# Patient Record
Sex: FEMALE | Race: WHITE | NOT HISPANIC OR LATINO | Employment: OTHER | URBAN - METROPOLITAN AREA
[De-identification: names, ages, dates, MRNs, and addresses within clinical notes are randomized per-mention and may not be internally consistent; named-entity substitution may affect disease eponyms.]

---

## 2017-02-08 ENCOUNTER — OFFICE VISIT (OUTPATIENT)
Dept: UROLOGY | Facility: CLINIC | Age: 72
End: 2017-02-08
Payer: MEDICARE

## 2017-02-08 VITALS — HEART RATE: 65 BPM | SYSTOLIC BLOOD PRESSURE: 119 MMHG | HEIGHT: 62 IN | DIASTOLIC BLOOD PRESSURE: 81 MMHG

## 2017-02-08 DIAGNOSIS — C68.9 UROTHELIAL CANCER (H): Primary | ICD-10-CM

## 2017-02-08 PROCEDURE — 88305 TISSUE EXAM BY PATHOLOGIST: CPT | Mod: 26 | Performed by: UROLOGY

## 2017-02-08 PROCEDURE — 88305 TISSUE EXAM BY PATHOLOGIST: CPT | Performed by: UROLOGY

## 2017-02-08 PROCEDURE — 52204 CYSTOSCOPY W/BIOPSY(S): CPT | Performed by: UROLOGY

## 2017-02-08 NOTE — NURSING NOTE
"Initial /81 mmHg  Pulse 65  Ht 5' 2\" (1.575 m) Estimated body mass index is 30.72 kg/(m^2) as calculated from the following:    Height as of this encounter: 5' 2\" (1.575 m).    Weight as of 4/12/16: 168 lb (76.204 kg). .    "

## 2017-02-08 NOTE — Clinical Note
SURGERYPLANNING/SCHEDULING WORKSHEET                              [facility] [place]  [address] [address]  [phone]                          Allyson Samuel                :  1945  MRN:  [id number]  Phone: [phone] (home)    Same Day Surgery [phone]   Surgeon: Forest Jim MD  Diagnosis:  History og urothelial cancer  Allergies:  Latex; Bees; and Macrobid   A preoperative evaluation by the primary care provider has been requested to summarize and modify, where possible, medical risks to the proposed surgery.  Specific risks requiring evaluation include   Patient Active Problem List    Diagnosis     CKD (chronic kidney disease) stage 3, GFR 30-59 ml/min     History of kidney removal     History of MI (myocardial infarction)     Urothelial cancer (H)     HTN, goal below 140/90     Adrenal mass (H)     Pelvic mass     STEMI (ST elevation myocardial infarction) (H)     Macular degeneration, both eyes     CAD (coronary artery disease)     Hyperlipidemia LDL goal <100     HTN (hypertension)     Basal cell adenocarcinoma     Tobacco abuse       ====================================================  Surgical Procedure:  Cystoscopy, right retrograde pyelogram  Length of Procedure:  30 min  Type of anesthesia:  Local with MAC  The proposed surgical procedure is considered LOW risk.  Date of Procedure: Thursday May 4, 2017   Time: 2:45pm      Informed Consent Obtained and Signed:  NO  ====================================================  Instructions to Same Day Surgery Staff  Pneumoboots  Special Equipment: C-Arm  Preop Antibiotic:  Ancef 2 gm IV  pre-op within one hour prior to incision For > 80 kg  Preop Pain Meds:  None  PreOp Orders:  Routine Standing Orders.  ====================================================  Instructions to the patient:  Preop physical exam scheduled (within 30 days or 7 days prior)  with:   ____TBD________________  Clinic:  ____________________                                         Date______________Time_________________________  Come to the hospital at: YOU WILL RECEIVE A CALL WITH ARRIVAL TIME  HOME PREPARATION:   Shower with Hibiclens the night before and the morning of surgery, gently cleaning skin from neck to feet  Bathe and brush teeth the morning of surgery.  Take medications with a sip of water the morning of surgery:   Check with  if taking insulin.  May have  a light meal, toast and clear liquids, up to 6 hrs before surgery  May have clear liquids (liquids one can read through) up to 2 hrs before surgery  NOTHING after 2 hrs before surgery  Stop aspirin 7-10 days before surgery  Stop NSAIDS (Ibuproven, Naproxen, etc) 5 days before surgery  Stop Plavix 7-10 days before surgery    Arrange for transportation to and from [facility]       Forest Jim MD    2/8/2017

## 2017-02-09 NOTE — PROGRESS NOTES
REASON FOR VISIT TODAY:  Cystoscopy with fulguration of bladder lesion.      HISTORY:  Ms. 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  She underwent a robotic-assisted laparoscopic nephroureterectomy on 04/14/2015.  Final pathology demonstrated high-grade T1 disease with negative margins.  The patient had a bladder recurrence in 07/2015 showing an area of focal high-grade disease, the rest being low-grade.  She had another recurrence 04/2016 consistent with low-grade noninvasive urothelial carcinoma.  The patient underwent fulguration of 2 other small lesions in the bladder that were low-grade in appearance in 08/2016.  She was recently noted on a surveillance cystoscopy in 12/2016 to have a small lesion at the bladder neck.  The patient comes in today for biopsy and fulguration of this lesion.      After informed consent was obtained, the patient was prepped and draped in standard sterile fashion.  A flexible cystoscope was introduced into the patient's bladder without difficulty.  Inspection of the bladder revealed the right UO to be orthotopic.  The left UO was surgically absent.  Inspection of the bladder revealed again the small 3-4 mm papillary lesion noted at the bladder neck on the right side.  Previously, this has been referred to as being on the left, but in fact it is on the right side at the bladder neck.  There were no other mucosal lesions, stones or foreign objects noted in the bladder.  We used a biopsy forceps to biopsy this lesion and was sent for permanent pathologic analysis.  We then used the Bugbee cautery to fulgurate the base of this lesion.  The patient tolerated the procedure without difficulty.  There were no other complications noted.  I suggested to Ms. Samuel that she will give us a call back next week to go over the results of the biopsy.  Presuming it comes back as a low-grade lesion we will simply plan on seeing the patient back  in the operating room in the next 3 months or so for a cystoscopy and a right-sided retrograde pyelogram as part of her surveillance of her upper tracts for history of upper tract TCC.  Ms. Samuel is in agreement with the plan.         KATTY FERMIN MD             D: 2017 17:13   T: 2017 06:32   MT: [id number]      Name:     SELMA SAMUEL   MRN:      [id number]        Account:      [id number]   :      1945           Visit Date:   2017      Document: [id number]

## 2017-02-10 LAB — COPATH REPORT: NORMAL

## 2017-04-11 ENCOUNTER — OFFICE VISIT (OUTPATIENT)
Dept: FAMILY MEDICINE | Facility: CLINIC | Age: 72
End: 2017-04-11
Payer: MEDICARE

## 2017-04-11 VITALS
HEART RATE: 72 BPM | DIASTOLIC BLOOD PRESSURE: 75 MMHG | WEIGHT: 166 LBS | TEMPERATURE: 98.6 F | BODY MASS INDEX: 30.36 KG/M2 | SYSTOLIC BLOOD PRESSURE: 115 MMHG | OXYGEN SATURATION: 96 %

## 2017-04-11 DIAGNOSIS — I10 ESSENTIAL HYPERTENSION: ICD-10-CM

## 2017-04-11 DIAGNOSIS — Z01.818 PREOP GENERAL PHYSICAL EXAM: ICD-10-CM

## 2017-04-11 DIAGNOSIS — Z12.11 SPECIAL SCREENING FOR MALIGNANT NEOPLASMS, COLON: Primary | ICD-10-CM

## 2017-04-11 DIAGNOSIS — N18.30 CKD (CHRONIC KIDNEY DISEASE) STAGE 3, GFR 30-59 ML/MIN (H): ICD-10-CM

## 2017-04-11 DIAGNOSIS — I25.10 CORONARY ARTERY DISEASE INVOLVING NATIVE CORONARY ARTERY OF NATIVE HEART WITHOUT ANGINA PECTORIS: ICD-10-CM

## 2017-04-11 DIAGNOSIS — E78.5 HYPERLIPIDEMIA LDL GOAL <100: ICD-10-CM

## 2017-04-11 DIAGNOSIS — F17.200 TOBACCO USE DISORDER: ICD-10-CM

## 2017-04-11 LAB
ANION GAP SERPL CALCULATED.3IONS-SCNC: 10 MMOL/L (ref 3–14)
BUN SERPL-MCNC: 23 MG/DL (ref 7–30)
CALCIUM SERPL-MCNC: 9.1 MG/DL (ref 8.5–10.1)
CHLORIDE SERPL-SCNC: 106 MMOL/L (ref 94–109)
CO2 SERPL-SCNC: 23 MMOL/L (ref 20–32)
CREAT SERPL-MCNC: 1.07 MG/DL (ref 0.52–1.04)
ERYTHROCYTE [DISTWIDTH] IN BLOOD BY AUTOMATED COUNT: 14.6 % (ref 10–15)
GFR SERPL CREATININE-BSD FRML MDRD: 50 ML/MIN/1.7M2
GLUCOSE SERPL-MCNC: 82 MG/DL (ref 70–99)
HCT VFR BLD AUTO: 45 % (ref 35–47)
HGB BLD-MCNC: 15.4 G/DL (ref 11.7–15.7)
MCH RBC QN AUTO: 32.6 PG (ref 26.5–33)
MCHC RBC AUTO-ENTMCNC: 34.2 G/DL (ref 31.5–36.5)
MCV RBC AUTO: 95 FL (ref 78–100)
PLATELET # BLD AUTO: 176 10E9/L (ref 150–450)
POTASSIUM SERPL-SCNC: 4.4 MMOL/L (ref 3.4–5.3)
RBC # BLD AUTO: 4.72 10E12/L (ref 3.8–5.2)
SODIUM SERPL-SCNC: 139 MMOL/L (ref 133–144)
WBC # BLD AUTO: 8.7 10E9/L (ref 4–11)

## 2017-04-11 PROCEDURE — 93000 ELECTROCARDIOGRAM COMPLETE: CPT | Performed by: NURSE PRACTITIONER

## 2017-04-11 PROCEDURE — 36415 COLL VENOUS BLD VENIPUNCTURE: CPT | Performed by: NURSE PRACTITIONER

## 2017-04-11 PROCEDURE — 85027 COMPLETE CBC AUTOMATED: CPT | Performed by: NURSE PRACTITIONER

## 2017-04-11 PROCEDURE — 99215 OFFICE O/P EST HI 40 MIN: CPT | Performed by: NURSE PRACTITIONER

## 2017-04-11 PROCEDURE — 80048 BASIC METABOLIC PNL TOTAL CA: CPT | Performed by: NURSE PRACTITIONER

## 2017-04-11 NOTE — MR AVS SNAPSHOT
After Visit Summary   4/11/2017    Allyson Samuel    MRN: [id number]           Patient Information     Date Of Birth          1945        Visit Information        Provider Department      4/11/2017 10:00 AM Rita Massey APRN [facility]        Today's Diagnoses     Special screening for malignant neoplasms, colon    -  1    Preop general physical exam        Coronary artery disease involving native coronary artery of native heart without angina pectoris        Tobacco use disorder          Care Instructions      1. Stop taking Plavix and aspirin 1 week before surgery   2. Do not take Lisinopril the morning of surgery  3.  Labs and EKG today    Before Your Surgery      Call your surgeon if there is any change in your health. This includes signs of a cold or flu (such as a sore throat, runny nose, cough, rash or fever).    Do not smoke, drink alcohol or take over the counter medicine (unless your surgeon or primary care doctor tells you to) for the 24 hours before and after surgery.    If you take prescribed drugs: Follow your doctor s orders about which medicines to take and which to stop until after surgery.    Eating and drinking prior to surgery: follow the instructions from your surgeon    Take a shower or bath the night before surgery. Use the soap your surgeon gave you to gently clean your skin. If you do not have soap from your surgeon, use your regular soap. Do not shave or scrub the surgery site.  Wear clean pajamas and have clean sheets on your bed.         Follow-ups after your visit        Additional Services     CARDIOLOGY EVAL ADULT REFERRAL       Your provider has referred you to:  [facility]: [facility] [phone]   [email or web address]    Please be aware that coverage of these services is subject to the terms and limitations of your health insurance plan.  Call member services at your  health plan with any benefit or coverage questions.      Type of Referral:  Cardiology Follow Up- patient lost to follow up since 2014- needs to est. care    Timeframe requested:  Within 1 month    Please bring the following to your appointment:  >>   Any x-rays, CTs or MRIs which have been performed.  Contact the facility where they were done to arrange for  prior to your scheduled appointment.  Any new CT, MRI or other procedures ordered by your specialist must be performed at a [facility] or coordinated by your clinic's referral office.   >>   List of current medications  >>   This referral request   >>   Any documents/labs given to you for this referral                  Your next 10 appointments already scheduled     May 04, 2017   Procedure with Forest Jim MD   [facility] (--)    [address] [address] [phone]           The medical center is located at [address] (between [address] in [place], [place] miles north of [place]).              Future tests that were ordered for you today     Open Future Orders        Priority Expected Expires Ordered    Fecal colorectal cancer screen (FIT) Routine 5/2/2017 7/4/2017 4/11/2017            Who to contact     If you have questions or need follow up information about today's clinic visit or your schedule please contact [facility] directly at [phone].  Normal or non-critical lab and imaging results will be communicated to you by MyChart, letter or phone within 4 business days after the clinic has received the results. If you do not hear from us within 7 days, please contact the clinic through MyChart or phone. If you have a critical or abnormal lab result, we will notify you by phone as soon as possible.  Submit refill requests through [employer] or call your pharmacy and they will forward the refill request to us. Please allow 3 business days for your refill to  be completed.          Additional Information About Your Visit        [employer] Information     [employer] gives you secure access to your electronic health record. If you see a primary care provider, you can also send messages to your care team and make appointments. If you have questions, please call your primary care clinic.  If you do not have a primary care provider, please call [phone] and they will assist you.        Care EveryWhere ID     This is your Care EveryWhere ID. This could be used by other organizations to access your [place] medical records  [id number]        Your Vitals Were     Pulse Temperature Pulse Oximetry BMI (Body Mass Index)          72 98.6  F (37  C) (Tympanic) 96% 30.36 kg/m2         Blood Pressure from Last 3 Encounters:   04/11/17 115/75   02/08/17 119/81   12/22/16 145/89    Weight from Last 3 Encounters:   04/11/17 166 lb (75.3 kg)   04/12/16 168 lb (76.2 kg)   10/21/15 158 lb (71.7 kg)              We Performed the Following     Basic metabolic panel     CARDIOLOGY EVAL ADULT REFERRAL     CBC with platelets     EKG 12-lead complete w/read - Clinics        Primary Care Provider Office Phone # Fax #    Rita Felicianot, JULIETA [facility] [phone]       [facility] [address] [address]        Thank you!     Thank you for choosing [facility]  for your care. Our goal is always to provide you with excellent care. Hearing back from our patients is one way we can continue to improve our services. Please take a few minutes to complete the written survey that you may receive in the mail after your visit with us. Thank you!             Your Updated Medication List - Protect others around you: Learn how to safely use, store and throw away your medicines at www.disposemymeds.org.          This list is accurate as of: 4/11/17 10:32 AM.  Always use your most recent med list.                   Brand Name Dispense Instructions for use    acetaminophen  650 MG 8 hour tablet     100 tablet    Take 650 mg by mouth every 6 hours       ALPHAGAN P 0.1 % ophthalmic solution   Generic drug:  brimonidine      Place 1 drop Into the left eye 2 times daily       aspirin 81 MG tablet     30 tablet    Take 1 tablet (81 mg) by mouth daily       atorvastatin 80 MG tablet    LIPITOR    90 tablet    Take 1 tablet (80 mg) by mouth daily       clopidogrel 75 MG tablet    PLAVIX    90 tablet    Take 1 tablet (75 mg) by mouth daily       docusate sodium 100 MG tablet    COLACE    62 tablet    Take 100 mg by mouth 2 times daily as needed for constipation       HYDROcodone-acetaminophen 5-325 MG per tablet    NORCO    10 tablet    Take 1 tablet by mouth every 6 hours as needed for moderate to severe pain       lisinopril 20 MG tablet    PRINIVIL/ZESTRIL    90 tablet    Take 1 tablet (20 mg) by mouth daily       metoprolol 25 MG 24 hr tablet    TOPROL-XL    90 tablet    Take 1 tablet (25 mg) by mouth daily       nitroglycerin 0.4 MG sublingual tablet    NITROSTAT    25 tablet    Place 1 tablet (0.4 mg) under the tongue every 5 minutes as needed for chest pain       timolol maleate 0.5 % opthalmic solution    ISTALOL     Apply 1 drop to eye 2 times daily Left       travoprost Z (benzalkonium) 0.004 % ophthalmic solution    TRAVATAN Z     Place 1 drop into both eyes At Bedtime       ZYRTEC PO      Reported on 4/11/2017

## 2017-04-11 NOTE — NURSING NOTE
"Initial /75 (BP Location: Left arm, Patient Position: Chair, Cuff Size: Adult Large)  Pulse 72  Temp 98.6  F (37  C) (Tympanic)  Wt 166 lb (75.3 kg)  SpO2 96%  BMI 30.36 kg/m2 Estimated body mass index is 30.36 kg/(m^2) as calculated from the following:    Height as of 2/8/17: 5' 2\" (1.575 m).    Weight as of this encounter: 166 lb (75.3 kg). .    Shruthi Torres    "

## 2017-04-11 NOTE — LETTER
[facility]  [address] [address]  Phone: [phone]    April 12, 2017    Allyson Samuel  [address]  [place] [address]          Dear Ms. Samuel,    The results of your recent lab tests were within normal limits. Enclosed is a copy of these results.  If you have any further questions or problems, please contact our office.        Sincerely,      Rita Massey NP / TRAVIS

## 2017-04-11 NOTE — PATIENT INSTRUCTIONS
1. Stop taking Plavix and aspirin 1 week before surgery   2. Do not take Lisinopril the morning of surgery  3.  Labs and EKG today    Before Your Surgery      Call your surgeon if there is any change in your health. This includes signs of a cold or flu (such as a sore throat, runny nose, cough, rash or fever).    Do not smoke, drink alcohol or take over the counter medicine (unless your surgeon or primary care doctor tells you to) for the 24 hours before and after surgery.    If you take prescribed drugs: Follow your doctor s orders about which medicines to take and which to stop until after surgery.    Eating and drinking prior to surgery: follow the instructions from your surgeon    Take a shower or bath the night before surgery. Use the soap your surgeon gave you to gently clean your skin. If you do not have soap from your surgeon, use your regular soap. Do not shave or scrub the surgery site.  Wear clean pajamas and have clean sheets on your bed.

## 2017-04-11 NOTE — PROGRESS NOTES
[facility]  [address] [address]  [phone]  Dept: [phone]    PRE-OP EVALUATION:  Today's date: 2017    Allyson Samuel (: 1945) presents for pre-operative evaluation assessment as requested by Dr. Jim.  She requires evaluation and anesthesia risk assessment prior to undergoing surgery/procedure for treatment of Urothelial CA .  Proposed procedure: Combined Cystoscopy    Date of Surgery/ Procedure: 17  Time of Surgery/ Procedure: to be determined  Hospital/Surgical Facility: [facility]    Primary Physician: Rita Massey  Type of Anesthesia Anticipated: Local with MAC    Patient has a Health Care Directive or Living Will:  NO    1. YES - Do you have a history of heart attack, stroke, stent, bypass or surgery on an artery in the head, neck, heart or legs? Has a stent- MI with PCI in  and STEMI in   2. NO - Do you ever have any pain or discomfort in your chest?  3. YES - Do you have a history of  Heart Failure?  4. NO - Are you troubled by shortness of breath when: walking on the level, up a slight hill or at night?  5. NO - Do you currently have a cold, bronchitis or other respiratory infection?  6. NO - Do you have a cough, shortness of breath or wheezing?  7. NO - Do you sometimes get pains in the calves of your legs when you walk?  8. NO - Do you or anyone in your family have previous history of blood clots?  9. NO - Do you or does anyone in your family have a serious bleeding problem such as prolonged bleeding following surgeries or cuts?  10. NO - Have you ever had problems with anemia or been told to take iron pills?  11. NO - Have you had any abnormal blood loss such as black, tarry or bloody stools, or abnormal vaginal bleeding?  12. NO - Have you ever had a blood transfusion?  13. NO- Have you or any of your relatives ever had problems with anesthesia?  14. YES - Do you have sleep apnea, excessive snoring or daytime drowsiness?  Snores- never checked for ELVIRA  15. NO - Do you have any prosthetic heart valves?  16. NO - Do you have prosthetic joints?  17. NO - Is there any chance that you may be pregnant?      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 Specifically denies chest pain, palpitations, dyspnea, orthopnea, PND or peripheral edema. Blood pressure readings have been in normal range. Current medication regimen is as listed below. Patient denies any side effects of medication.      HYPERLIPIDEMIA - Patient has a long history of significant Hyperlipidemia requiring medication for treatment with recent good control. Patient reports no problems or side effects with the medication.      CAD - Patient has a longstanding history of mod-severe CAD. Patient denies recent chest pain or NTG use, denies exercise induced dyspnea or PND. Last Cardiac MRI on 6/2014 normal biventricular function.      SLEEP PROBLEM - Patient has a longstanding history of snoring and fatigue of moderate severity. Patient has tried OTC medications with limited success         MEDICAL HISTORY:                                                      Patient Active Problem List    Diagnosis Date Noted     CKD (chronic kidney disease) stage 3, GFR 30-59 ml/min 10/19/2015     Priority: Medium     History of MI (myocardial infarction) 04/08/2015     Priority: Medium     Urothelial cancer (H) 04/08/2015     Priority: Medium     HTN, goal below 140/90 01/26/2015     Priority: Medium     STEMI (ST elevation myocardial  infarction) (H) 06/30/2014     Priority: Medium     6/2014 - new stent placed       History of kidney removal 10/19/2015     Left kidney removed surgically due to adrenal mass         Adrenal mass (H) 01/24/2015     Pelvic mass 01/24/2015     Macular degeneration, both eyes 06/18/2012     CAD (coronary artery disease) 06/18/2012     MI/ stent in 2003       Hyperlipidemia LDL goal <100 06/18/2012     HTN (hypertension) 06/18/2012     Basal cell adenocarcinoma 06/18/2012     face       Tobacco abuse 06/18/2012      Past Medical History:   Diagnosis Date     CAD (coronary artery disease)     2003- MI with PCI to RCA, 6/2014 inferior STEMI- instent thrombosis RCA s/p PCI     Heart attack (H)      HTN (hypertension)      Hyperlipidemia LDL goal <70      Macular degeneration      PONV (postoperative nausea and vomiting)      Tobacco use      Past Surgical History:   Procedure Laterality Date     CARDIAC SURGERY       CYSTOSCOPY, BIOPSY BLADDER, COMBINED Right 4/14/2016    Procedure: COMBINED CYSTOSCOPY, BIOPSY BLADDER;  Surgeon: Forest Jim MD;  Location: [facility]     CYSTOSCOPY, REMOVE STENT(S), COMBINED Left 4/14/2015    Procedure: COMBINED CYSTOSCOPY, REMOVE STENT(S);  Surgeon: Forest Jim MD;  Location: [facility]     CYSTOSCOPY, RETROGRADES, COMBINED Left 10/21/2015    Procedure: COMBINED CYSTOSCOPY, RETROGRADES;  Surgeon: Forest Jim MD;  Location: [facility]     CYSTOSCOPY, RETROGRADES, INSERT STENT URETER(S), COMBINED Left 3/12/2015    Procedure: COMBINED CYSTOSCOPY, RETROGRADES, INSERT STENT URETER(S);  Surgeon: Forest Jim MD;  Location: [facility]     DAVINCI NEPHROURETERECTOMY Left 4/14/2015    Procedure: DAVINCI NEPHROURETERECTOMY;  Surgeon: Forest Jim MD;  Location: [facility] OR     GYN SURGERY       HYSTERECTOMY      age 40, fibroids. Still has right ovary     Current Outpatient Prescriptions   Medication Sig Dispense Refill     metoprolol (TOPROL-XL)  25 MG 24 hr tablet Take 1 tablet (25 mg) by mouth daily 90 tablet 3     lisinopril (PRINIVIL,ZESTRIL) 20 MG tablet Take 1 tablet (20 mg) by mouth daily 90 tablet 3     timolol maleate (ISTALOL) 0.5 % opthalmic solution Apply 1 drop to eye 2 times daily Left       clopidogrel (PLAVIX) 75 MG tablet Take 1 tablet (75 mg) by mouth daily 90 tablet 3     atorvastatin (LIPITOR) 80 MG tablet Take 1 tablet (80 mg) by mouth daily 90 tablet 3     brimonidine (ALPHAGAN P) 0.1 % ophthalmic solution Place 1 drop Into the left eye 2 times daily       travoprost Z, benzalkonium, (TRAVATAN Z) 0.004 % ophthalmic solution Place 1 drop into both eyes At Bedtime        aspirin 81 MG tablet Take 1 tablet (81 mg) by mouth daily 30 tablet      HYDROcodone-acetaminophen (NORCO) 5-325 MG per tablet Take 1 tablet by mouth every 6 hours as needed for moderate to severe pain (Patient not taking: Reported on 4/11/2017) 10 tablet 0     Cetirizine HCl (ZYRTEC PO) Reported on 4/11/2017       acetaminophen 650 MG TABS Take 650 mg by mouth every 6 hours (Patient not taking: Reported on 4/11/2017) 100 tablet 0     docusate sodium (COLACE) 100 MG tablet Take 100 mg by mouth 2 times daily as needed for constipation (Patient not taking: Reported on 4/11/2017) 62 tablet 1     nitroglycerin (NITROSTAT) 0.4 MG SL tablet Place 1 tablet (0.4 mg) under the tongue every 5 minutes as needed for chest pain (Patient not taking: Reported on 4/11/2017) 25 tablet 3     OTC products: None, except as noted above    Allergies   Allergen Reactions     Latex Rash     Bees      Macrobid [Nitrofurantoin] Nausea      Latex Allergy: NO    Social History   Substance Use Topics     Smoking status: Current Every Day Smoker     Packs/day: 0.10     Types: Cigarettes     Smokeless tobacco: Never Used      Comment: Trying hard to quit 9/24/15.  smokes about 2-3 cig per day     Alcohol use Yes      Comment: few drinks per month     History   Drug Use No       REVIEW OF SYSTEMS:                                                     C: NEGATIVE for fever, chills, change in weight  I: NEGATIVE for worrisome rashes, moles or lesions  E: NEGATIVE for vision changes or irritation  E/M: NEGATIVE for ear, mouth and throat problems  R: NEGATIVE for significant cough or SOB  B: NEGATIVE for masses, tenderness or discharge  CV: NEGATIVE for chest pain, palpitations or peripheral edema  GI: NEGATIVE for nausea, abdominal pain, heartburn, or change in bowel habits  : NEGATIVE for frequency, dysuria, or hematuria  M: NEGATIVE for significant arthralgias or myalgia  N: NEGATIVE for weakness, dizziness or paresthesias  E: NEGATIVE for temperature intolerance, skin/hair changes  H: NEGATIVE for bleeding problems  P: NEGATIVE for changes in mood or affect    EXAM:                                                    /75 (BP Location: Left arm, Patient Position: Chair, Cuff Size: Adult Large)  Pulse 72  Temp 98.6  F (37  C) (Tympanic)  Wt 166 lb (75.3 kg)  SpO2 96%  BMI 30.36 kg/m2    GENERAL APPEARANCE: healthy, alert and no distress     EYES: EOMI, PERRL     HENT: ear canals and TM's normal and nose and mouth without ulcers or lesions     NECK: no adenopathy, no asymmetry, masses, or scars and thyroid normal to palpation     RESP: lungs clear to auscultation - no rales, rhonchi or wheezes     CV: regular rates and rhythm, normal S1 S2, no S3 or S4 and no murmur, click or rub     ABDOMEN:  soft, nontender, no HSM or masses and bowel sounds normal     MS: extremities normal- no gross deformities noted, no evidence of inflammation in joints, FROM in all extremities.     SKIN: no suspicious lesions or rashes     NEURO: Normal strength and tone, sensory exam grossly normal, mentation intact and speech normal     PSYCH: mentation appears normal. and affect normal/bright     LYMPHATICS: No axillary, cervical, or supraclavicular nodes    DIAGNOSTICS:                                                      EKG:  Normal Sinus Rhythm, unchanged from previous tracings  Labs Resulted Today:   Results for orders placed or performed in visit on 04/11/17   CBC with platelets   Result Value Ref Range    WBC 8.7 4.0 - 11.0 10e9/L    RBC Count 4.72 3.8 - 5.2 10e12/L    Hemoglobin 15.4 11.7 - 15.7 g/dL    Hematocrit 45.0 35.0 - 47.0 %    MCV 95 78 - 100 fl    MCH 32.6 26.5 - 33.0 pg    MCHC 34.2 31.5 - 36.5 g/dL    RDW 14.6 10.0 - 15.0 %    Platelet Count 176 150 - 450 10e9/L   Basic metabolic panel   Result Value Ref Range    Sodium 139 133 - 144 mmol/L    Potassium 4.4 3.4 - 5.3 mmol/L    Chloride 106 94 - 109 mmol/L    Carbon Dioxide 23 20 - 32 mmol/L    Anion Gap 10 3 - 14 mmol/L    Glucose 82 70 - 99 mg/dL    Urea Nitrogen 23 7 - 30 mg/dL    Creatinine 1.07 (H) 0.52 - 1.04 mg/dL    GFR Estimate 50 (L) >60 mL/min/1.7m2    GFR Estimate If Black 61 >60 mL/min/1.7m2    Calcium 9.1 8.5 - 10.1 mg/dL       Recent Labs   Lab Test  04/12/16   0919  10/15/15   1014  10/13/15   1052   08/21/14   1039   06/30/14   0438   06/29/14   1932   HGB  15.0   --   14.0   < >   --    < >  14.4   < >  14.3   PLT  186   --   177   < >   --    < >  187   < >  190   INR   --    --    --    --    --    --    --    --   1.09   NA  137  138   --    < >   --    < >  138   < >  140   POTASSIUM  5.0  4.4   --    < >   --    < >  4.2   < >  5.1   CR  1.16*  1.17*   --    < >   --    < >  0.81   < >  0.93   A1C   --    --    --    --   6.2*   --   5.5   --    --     < > = values in this interval not displayed.        IMPRESSION:                                                    Reason for surgery/procedure: Cystoscopy   Diagnosis/reason for consult: preoperative exam    The proposed surgical procedure is considered INTERMEDIATE risk.    REVISED CARDIAC RISK INDEX  The patient has the following serious cardiovascular risks for perioperative complications such as (MI, PE, VFib and 3  AV Block):  Coronary Artery Disease (MI, positive stress test, angina, Qs on  EKG)  INTERPRETATION: 1 risks: Class II (low risk - 0.9% complication rate)    The patient has the following additional risks for perioperative complications:  No identified additional risks      ICD-10-CM    1. Special screening for malignant neoplasms, colon Z12.11 Fecal colorectal cancer screen (FIT)   2. Preop general physical exam Z01.818 CBC with platelets     Basic metabolic panel     EKG 12-lead complete w/read - Clinics   3. Coronary artery disease involving native coronary artery of native heart without angina pectoris I25.10 CARDIOLOGY EVAL ADULT REFERRAL   4. Tobacco use disorder F17.200    5. Hyperlipidemia LDL goal <100 E78.5    6. Essential hypertension I10    7. CKD (chronic kidney disease) stage 3, GFR 30-59 ml/min N18.3        RECOMMENDATIONS:                                                      --Consult hospital rounder / IM to assist post-op medical management    Cardiovascular Risk  Patient is already on a Beta Blocker. Continue Betablocker therapy after surgery, using Beta blocker order set as necessary for NPO status.  **Preop stress imaging recommended due to current serious symptoms/signs that would warrant stress testing regardless of preoperative status**      Pulmonary Risk  Incentive spirometry post op  Respiratory Therapy (Respiratory Care IP Consult)  post op  Advised smoking cessation.       Obstructive Sleep Apnea (or suspected sleep apnea)  Hospital staff are advised to monitor for sleep related oxygen desaturations due to suspicion of ELVIRA      --Patient is to take all scheduled medications on the day of surgery EXCEPT for modifications listed below.    Anticoagulant or Antiplatelet Medication Use  ASPIRIN: Discontinue ASA 7-10 days prior to procedure to reduce bleeding risk.  It should be resumed post-operatively.  PLAVIX: No contraindication to stopping plavix.  Stop 7-10 days prior to surgery        ACE Inhibitor or Angiotensin Receptor Blocker (ARB) Use  Ace inhibitor or  Angiotensin Receptor Blocker (ARB) and should HOLD this medication for the 24 hours prior to surgery.      APPROVAL GIVEN to proceed with proposed procedure, without further diagnostic evaluation       Signed Electronically by: JULIETA Molina CNP    Copy of this evaluation report is provided to requesting physician.    [place] Preop Guidelines

## 2017-04-15 DIAGNOSIS — Z12.11 SPECIAL SCREENING FOR MALIGNANT NEOPLASMS, COLON: ICD-10-CM

## 2017-04-15 PROCEDURE — 82274 ASSAY TEST FOR BLOOD FECAL: CPT | Performed by: NURSE PRACTITIONER

## 2017-04-17 LAB — HEMOCCULT STL QL IA: NEGATIVE

## 2017-04-21 ENCOUNTER — OFFICE VISIT (OUTPATIENT)
Dept: CARDIOLOGY | Facility: CLINIC | Age: 72
End: 2017-04-21
Attending: NURSE PRACTITIONER
Payer: MEDICARE

## 2017-04-21 VITALS
OXYGEN SATURATION: 97 % | HEART RATE: 64 BPM | BODY MASS INDEX: 30.36 KG/M2 | WEIGHT: 166 LBS | SYSTOLIC BLOOD PRESSURE: 156 MMHG | DIASTOLIC BLOOD PRESSURE: 83 MMHG

## 2017-04-21 DIAGNOSIS — I25.10 CORONARY ARTERY DISEASE INVOLVING NATIVE CORONARY ARTERY OF NATIVE HEART WITHOUT ANGINA PECTORIS: Primary | ICD-10-CM

## 2017-04-21 DIAGNOSIS — Z01.810 PRE-OPERATIVE CARDIOVASCULAR EXAMINATION: ICD-10-CM

## 2017-04-21 PROCEDURE — 99214 OFFICE O/P EST MOD 30 MIN: CPT | Performed by: INTERNAL MEDICINE

## 2017-04-21 NOTE — MR AVS SNAPSHOT
After Visit Summary   4/21/2017    Allyson Samuel    MRN: [id number]           Patient Information     Date Of Birth          1945        Visit Information        Provider Department      4/21/2017 10:30 AM Todd Ryder MD [facility] PHYSICIAN HEART AT [facility]        Today's Diagnoses     Coronary artery disease involving native coronary artery of native heart without angina pectoris    -  1    Pre-operative cardiovascular examination           Follow-ups after your visit        Your next 10 appointments already scheduled     May 04, 2017   Procedure with Forest Jim MD   [facility] PeriOP Services (--)    [address] [address] [phone]           The medical center is located at [address] (between [address] in [place], [place] north of [place]).              Who to contact     If you have questions or need follow up information about today's clinic visit or your schedule please contact [facility] PHYSICIAN HEART AT [facility] directly at [phone].  Normal or non-critical lab and imaging results will be communicated to you by mCASHhart, letter or phone within 4 business days after the clinic has received the results. If you do not hear from us within 7 days, please contact the clinic through [employer] or phone. If you have a critical or abnormal lab result, we will notify you by phone as soon as possible.  Submit refill requests through [employer] or call your pharmacy and they will forward the refill request to us. Please allow 3 business days for your refill to be completed.          Additional Information About Your Visit        mCASHhart Information     [employer] gives you secure access to your electronic health record. If you see a primary care provider, you can also send messages to your care team and make appointments. If you have questions, please call your primary care clinic.   If you do not have a primary care provider, please call [phone] and they will assist you.        Care EveryWhere ID     This is your Care EveryWhere ID. This could be used by other organizations to access your [employer] medical records  [id number]        Your Vitals Were     Pulse Pulse Oximetry BMI (Body Mass Index)             64 97% 30.36 kg/m2          Blood Pressure from Last 3 Encounters:   04/21/17 156/83   04/11/17 115/75   02/08/17 119/81    Weight from Last 3 Encounters:   04/21/17 75.3 kg (166 lb)   04/11/17 75.3 kg (166 lb)   04/12/16 76.2 kg (168 lb)              Today, you had the following     No orders found for display         Today's Medication Changes          These changes are accurate as of: 4/21/17 11:10 AM.  If you have any questions, ask your nurse or doctor.               Stop taking these medicines if you haven't already. Please contact your care team if you have questions.     clopidogrel 75 MG tablet   Commonly known as:  PLAVIX   Stopped by:  Todd Ryder MD           docusate sodium 100 MG tablet   Commonly known as:  COLACE   Stopped by:  Todd Ryder MD           HYDROcodone-acetaminophen 5-325 MG per tablet   Commonly known as:  NORCO   Stopped by:  Todd Ryder MD                    Primary Care Provider Office Phone # Fax #    JULIETA Molina [facility] [phone]       [place] [address]        Thank you!     Thank you for choosing [facility] PHYSICIAN HEART AT [facility]  for your care. Our goal is always to provide you with excellent care. Hearing back from our patients is one way we can continue to improve our services. Please take a few minutes to complete the written survey that you may receive in the mail after your visit with us. Thank you!             Your Updated Medication List - Protect others around you: Learn how to safely use, store and throw away  your medicines at www.disposemymeds.org.          This list is accurate as of: 4/21/17 11:10 AM.  Always use your most recent med list.                   Brand Name Dispense Instructions for use    acetaminophen 650 MG 8 hour tablet     100 tablet    Take 650 mg by mouth every 6 hours       ALPHAGAN P 0.1 % ophthalmic solution   Generic drug:  brimonidine      Place 1 drop Into the left eye 2 times daily       aspirin 81 MG tablet     30 tablet    Take 1 tablet (81 mg) by mouth daily       atorvastatin 80 MG tablet    LIPITOR    90 tablet    Take 1 tablet (80 mg) by mouth daily       lisinopril 20 MG tablet    PRINIVIL/ZESTRIL    90 tablet    Take 1 tablet (20 mg) by mouth daily       metoprolol 25 MG 24 hr tablet    TOPROL-XL    90 tablet    Take 1 tablet (25 mg) by mouth daily       nitroglycerin 0.4 MG sublingual tablet    NITROSTAT    25 tablet    Place 1 tablet (0.4 mg) under the tongue every 5 minutes as needed for chest pain       timolol maleate 0.5 % opthalmic solution    ISTALOL     Apply 1 drop to eye 2 times daily Left       travoprost Z (benzalkonium) 0.004 % ophthalmic solution    TRAVATAN Z     Place 1 drop into both eyes At Bedtime       ZYRTEC PO      Reported on 4/11/2017

## 2017-04-21 NOTE — LETTER
4/21/2017    Rita Massey, APRN [facility]  [address] [place] [address]    RE: Allyson Samuel       Dear Colleague,    I had the pleasure of seeing Allyson Samuel in the [facility].    CARDIOLOGY CONSULT    REASON FOR CONSULT: CAD, pre-op    PRIMARY CARE PHYSICIAN:  Rita Massey    HISTORY OF PRESENT ILLNESS:  72-year-old female with known coronary artery disease is seen for preop evaluation.  Medical history also notable for hypertension, dyslipidemia, history of tobacco use, and previous nephrectomy in 2015 for urothelial cancer.     She had inferior MI in 2003 with stent to the mid RCA.  In 2014 she presented again with STEMI from a stent thrombosis, angiogram then showed widely patent left main, LAD, and circumflex, she had successful thrombectomy and drug-eluting stent to the mid RCA.  Cardiac MRI June 2014 showed ejection fraction 57%, nontransmural infarct involving greater than 75% of wall thickness in the basal, mid, apical inferior segments, apical, and inferior segments, remainder of LV had good viability.     She's been feeling well recently.  She will do some walking in the stores and other light activity with no shortness of breath or chest pain.  Her weight has been stable with no lower extremity edema.    She has had serial cystoscopies since her nephrectomy in 2015.  She's not had any cardiac consultations during these.  She's been off her Plavix for 6 months.  Previously she would hold her aspirin for about 7 days before a cystoscopy.    PAST MEDICAL HISTORY:  Past Medical History:   Diagnosis Date     CAD (coronary artery disease)     2003- MI with PCI to RCA, 6/2014 inferior STEMI- instent thrombosis RCA s/p PCI     Heart attack (H)      HTN (hypertension)      Hyperlipidemia LDL goal <70      Macular degeneration      PONV (postoperative nausea and vomiting)      Tobacco use        MEDICATIONS:  Current Outpatient Prescriptions   Medication      metoprolol (TOPROL-XL) 25 MG 24 hr tablet     lisinopril (PRINIVIL,ZESTRIL) 20 MG tablet     Cetirizine HCl (ZYRTEC PO)     timolol maleate (ISTALOL) 0.5 % opthalmic solution     atorvastatin (LIPITOR) 80 MG tablet     brimonidine (ALPHAGAN P) 0.1 % ophthalmic solution     travoprost Z, benzalkonium, (TRAVATAN Z) 0.004 % ophthalmic solution     aspirin 81 MG tablet     nitroglycerin (NITROSTAT) 0.4 MG SL tablet     acetaminophen 650 MG TABS     No current facility-administered medications for this visit.        ALLERGIES:  Allergies   Allergen Reactions     Latex Rash     Bees      Macrobid [Nitrofurantoin] Nausea       SOCIAL HISTORY:  I have reviewed this patient's social history and updated it with pertinent information if needed. Allyson Samuel  reports that she has been smoking Cigarettes.  She has been smoking about 0.10 packs per day. She has never used smokeless tobacco. She reports that she drinks alcohol. She reports that she does not use illicit drugs.    FAMILY HISTORY:  I have reviewed this patient's family history and updated it with pertinent information if needed.   Family History   Problem Relation Age of Onset     Breast Cancer Mother      CEREBROVASCULAR DISEASE Father      HEART DISEASE Brother      pacemaker       REVIEW OF SYSTEMS:  Constitutional:  No weight loss, fever, chills, weakness or fatigue.  HEENT:  Eyes:  No visual loss, blurred vision, double vision or yellow sclerae. No hearing loss, sneezing, congestion, runny nose or sore throat.  Skin:  No rash or itching.  Cardiovascular: per HPI  Respiratory: per HPI  GI:  No anorexia, nausea, vomiting or diarrhea. No abdominal pain or blood.  :  No dysurea, hematuria  Neurologic:  No headache, dizziness, syncope, paralysis, ataxia, numbness or tingling in the extremities. No change in bowel or bladder control.  Musculoskeletal:  No muscle, back pain, joint pain or stiffness.  Hematologic:  No anemia, bleeding or bruising.  Lymphatics:  No  enlarged nodes. No history of splenectomy.  Psychiatric:  No history of depression or anxiety.  Endocrine:  No reports of sweating, cold or heat intolerance. No polyuria or polydipsia.  Allergies:  No history of asthma, hives, eczema or rhinitis.    PHYSICAL EXAM:      BP: 156/83 Pulse: 64     SpO2: 97 %      Vital Signs with Ranges  Pulse:  [64] 64  BP: (156)/(83) 156/83  SpO2:  [97 %] 97 %  166 lbs 0 oz    Constitutional: awake, alert, no distress  Eyes: PERRL, sclera nonicteric  ENT: trachea midline  Respiratory: clear lungs  Cardiovascular: Regular rate and rhythm, no murmurs  GI: nondistended, nontender, bowel sounds present  Lymph/Hematologic: no lymphadenopathy  Skin: dry, no rash  Musculoskeletal: good muscle tone, strength 5/5 in upper and lower extremities  Neurologic: no focal deficits  Neuropsychiatric: appropriate affact    DATA:  Labs:   April 2017: Potassium 4.4, creatinine 1.1     EKG April 11, 2017: Sinus rhythm, rate 64, inferior Q waves     ASSESSMENT:  72-year-old female with coronary artery disease seen for follow-up in preop evaluation.  She is doing well from a cardiac perspective and has no concerning symptoms.  She is over 2 years from her last stent, it is okay that she is off her clopidogrel.  She has no symptoms of angina, heart failure, arrhythmia, or severe valve disease.  Therefore she is low risk for any cardiac complication for her upcoming cystoscopy.    RECOMMENDATIONS:  1.  Coronary artery disease  - Continue current medications  - No need for cardiac testing at this point    2.  Preop evaluation  - Okay to hold aspirin before cystoscopy, restart as soon as possible afterward  - Low risk for this procedure, no further cardiac testing needed    Follow-up in 1 year.    Todd Ryder MD  Cardiology - [facility]  Pager:  [phone]  Text Page  April 21, 2017    Thank you for allowing me to participate in the care of your patient.    Sincerely,     Todd yRder MD      [facility]

## 2017-04-21 NOTE — PROGRESS NOTES
CARDIOLOGY CONSULT    REASON FOR CONSULT: CAD, pre-op    PRIMARY CARE PHYSICIAN:  Rita Massey    HISTORY OF PRESENT ILLNESS:  72-year-old female with known coronary artery disease is seen for preop evaluation.  Medical history also notable for hypertension, dyslipidemia, history of tobacco use, and previous nephrectomy in 2015 for urothelial cancer.     She had inferior MI in 2003 with stent to the mid RCA.  In 2014 she presented again with STEMI from a stent thrombosis, angiogram then showed widely patent left main, LAD, and circumflex, she had successful thrombectomy and drug-eluting stent to the mid RCA.  Cardiac MRI June 2014 showed ejection fraction 57%, nontransmural infarct involving greater than 75% of wall thickness in the basal, mid, apical inferior segments, apical, and inferior segments, remainder of LV had good viability.     She's been feeling well recently.  She will do some walking in the stores and other light activity with no shortness of breath or chest pain.  Her weight has been stable with no lower extremity edema.    She has had serial cystoscopies since her nephrectomy in 2015.  She's not had any cardiac consultations during these.  She's been off her Plavix for 6 months.  Previously she would hold her aspirin for about 7 days before a cystoscopy.    PAST MEDICAL HISTORY:  Past Medical History:   Diagnosis Date     CAD (coronary artery disease)     2003- MI with PCI to RCA, 6/2014 inferior STEMI- instent thrombosis RCA s/p PCI     Heart attack (H)      HTN (hypertension)      Hyperlipidemia LDL goal <70      Macular degeneration      PONV (postoperative nausea and vomiting)      Tobacco use        MEDICATIONS:  Current Outpatient Prescriptions   Medication     metoprolol (TOPROL-XL) 25 MG 24 hr tablet     lisinopril (PRINIVIL,ZESTRIL) 20 MG tablet     Cetirizine HCl (ZYRTEC PO)     timolol maleate (ISTALOL) 0.5 % opthalmic solution     atorvastatin (LIPITOR) 80 MG tablet     brimonidine  (ALPHAGAN P) 0.1 % ophthalmic solution     travoprost Z, benzalkonium, (TRAVATAN Z) 0.004 % ophthalmic solution     aspirin 81 MG tablet     nitroglycerin (NITROSTAT) 0.4 MG SL tablet     acetaminophen 650 MG TABS     No current facility-administered medications for this visit.        ALLERGIES:  Allergies   Allergen Reactions     Latex Rash     Bees      Macrobid [Nitrofurantoin] Nausea       SOCIAL HISTORY:  I have reviewed this patient's social history and updated it with pertinent information if needed. Allyson Samuel  reports that she has been smoking Cigarettes.  She has been smoking about 0.10 packs per day. She has never used smokeless tobacco. She reports that she drinks alcohol. She reports that she does not use illicit drugs.    FAMILY HISTORY:  I have reviewed this patient's family history and updated it with pertinent information if needed.   Family History   Problem Relation Age of Onset     Breast Cancer Mother      CEREBROVASCULAR DISEASE Father      HEART DISEASE Brother      pacemaker       REVIEW OF SYSTEMS:  Constitutional:  No weight loss, fever, chills, weakness or fatigue.  HEENT:  Eyes:  No visual loss, blurred vision, double vision or yellow sclerae. No hearing loss, sneezing, congestion, runny nose or sore throat.  Skin:  No rash or itching.  Cardiovascular: per HPI  Respiratory: per HPI  GI:  No anorexia, nausea, vomiting or diarrhea. No abdominal pain or blood.  :  No dysurea, hematuria  Neurologic:  No headache, dizziness, syncope, paralysis, ataxia, numbness or tingling in the extremities. No change in bowel or bladder control.  Musculoskeletal:  No muscle, back pain, joint pain or stiffness.  Hematologic:  No anemia, bleeding or bruising.  Lymphatics:  No enlarged nodes. No history of splenectomy.  Psychiatric:  No history of depression or anxiety.  Endocrine:  No reports of sweating, cold or heat intolerance. No polyuria or polydipsia.  Allergies:  No history of asthma, hives, eczema  or rhinitis.    PHYSICAL EXAM:      BP: 156/83 Pulse: 64     SpO2: 97 %      Vital Signs with Ranges  Pulse:  [64] 64  BP: (156)/(83) 156/83  SpO2:  [97 %] 97 %  166 lbs 0 oz    Constitutional: awake, alert, no distress  Eyes: PERRL, sclera nonicteric  ENT: trachea midline  Respiratory: clear lungs  Cardiovascular: Regular rate and rhythm, no murmurs  GI: nondistended, nontender, bowel sounds present  Lymph/Hematologic: no lymphadenopathy  Skin: dry, no rash  Musculoskeletal: good muscle tone, strength 5/5 in upper and lower extremities  Neurologic: no focal deficits  Neuropsychiatric: appropriate affact    DATA:  Labs:   April 2017: Potassium 4.4, creatinine 1.1     EKG April 11, 2017: Sinus rhythm, rate 64, inferior Q waves     ASSESSMENT:  72-year-old female with coronary artery disease seen for follow-up in preop evaluation.  She is doing well from a cardiac perspective and has no concerning symptoms.  She is over 2 years from her last stent, it is okay that she is off her clopidogrel.  She has no symptoms of angina, heart failure, arrhythmia, or severe valve disease.  Therefore she is low risk for any cardiac complication for her upcoming cystoscopy.    RECOMMENDATIONS:  1.  Coronary artery disease  - Continue current medications  - No need for cardiac testing at this point    2.  Preop evaluation  - Okay to hold aspirin before cystoscopy, restart as soon as possible afterward  - Low risk for this procedure, no further cardiac testing needed    Follow-up in 1 year.    Todd Ryder MD  Cardiology - [facility]  Pager:  [phone]  Text Page  April 21, 2017

## 2017-04-26 ENCOUNTER — OFFICE VISIT (OUTPATIENT)
Dept: FAMILY MEDICINE | Facility: CLINIC | Age: 72
End: 2017-04-26
Payer: MEDICARE

## 2017-04-26 VITALS
WEIGHT: 172 LBS | RESPIRATION RATE: 16 BRPM | TEMPERATURE: 98.8 F | HEIGHT: 62 IN | DIASTOLIC BLOOD PRESSURE: 69 MMHG | HEART RATE: 68 BPM | SYSTOLIC BLOOD PRESSURE: 136 MMHG | BODY MASS INDEX: 31.65 KG/M2 | OXYGEN SATURATION: 97 %
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
## 2017-04-26 PROCEDURE — 99212 OFFICE O/P EST SF 10 MIN: CPT | Performed by: FAMILY MEDICINE

## 2017-04-26 NOTE — PROGRESS NOTES
"  SUBJECTIVE:                                                    Allyson Samuel is a 72 year old female who presents to clinic today for the following health issues:      Insect Bite      Duration: 1 week ago was bit by a tick - patient is almost positive it is a wood tick.    Description (location/character/radiation): tick found attached to skin on second interdigital web on left hand when patient woke up 1 week ago. Patient states she is unsure how long the tick is attached.  Area turned \"a little red\" and \"itchy\".    Red, swollen and itchy. She pulled it off and is unsure if she was able to get it all out.     Intensity:  mild    Accompanying signs and symptoms: None    History (similar episodes/previous evaluation): None    Precipitating or alleviating factors: None    Therapies tried and outcome: Cortisone Cream, relieved the itching    Verified above history with patient.       Problem list and histories reviewed & adjusted, as indicated.  Additional history: as documented    Patient Active Problem List   Diagnosis     Macular degeneration, both eyes     CAD (coronary artery disease)     Hyperlipidemia LDL goal <100     HTN (hypertension)     Basal cell adenocarcinoma     Tobacco abuse     STEMI (ST elevation myocardial infarction) (H)     Adrenal mass (H)     Pelvic mass     HTN, goal below 140/90     History of MI (myocardial infarction)     Urothelial cancer (H)     CKD (chronic kidney disease) stage 3, GFR 30-59 ml/min     History of kidney removal     Past Surgical History:   Procedure Laterality Date     CARDIAC SURGERY       CYSTOSCOPY, BIOPSY BLADDER, COMBINED Right 4/14/2016    Procedure: COMBINED CYSTOSCOPY, BIOPSY BLADDER;  Surgeon: Forest Jim MD;  Location: [facility]     CYSTOSCOPY, REMOVE STENT(S), COMBINED Left 4/14/2015    Procedure: COMBINED CYSTOSCOPY, REMOVE STENT(S);  Surgeon: Forest Jim MD;  Location: [facility]     CYSTOSCOPY, RETROGRADES, COMBINED Left 10/21/2015    " Procedure: COMBINED CYSTOSCOPY, RETROGRADES;  Surgeon: Forest Jim MD;  Location: [facility]     CYSTOSCOPY, RETROGRADES, INSERT STENT URETER(S), COMBINED Left 3/12/2015    Procedure: COMBINED CYSTOSCOPY, RETROGRADES, INSERT STENT URETER(S);  Surgeon: Forest Jim MD;  Location: [facility]     DAVINCI NEPHROURETERECTOMY Left 4/14/2015    Procedure: DAVINCI NEPHROURETERECTOMY;  Surgeon: Forest Jim MD;  Location: [facility] OR     GYN SURGERY       HYSTERECTOMY      age 40, fibroids. Still has right ovary       Social History   Substance Use Topics     Smoking status: Current Every Day Smoker     Packs/day: 0.10     Types: Cigarettes     Smokeless tobacco: Never Used      Comment: Trying hard to quit 9/24/15.  smokes about 2-3 cig per day     Alcohol use Yes      Comment: few drinks per month     Family History   Problem Relation Age of Onset     Breast Cancer Mother      CEREBROVASCULAR DISEASE Father      HEART DISEASE Brother      pacemaker         Current Outpatient Prescriptions   Medication Sig Dispense Refill     metoprolol (TOPROL-XL) 25 MG 24 hr tablet Take 1 tablet (25 mg) by mouth daily 90 tablet 3     lisinopril (PRINIVIL,ZESTRIL) 20 MG tablet Take 1 tablet (20 mg) by mouth daily 90 tablet 3     Cetirizine HCl (ZYRTEC PO) Reported on 4/11/2017       timolol maleate (ISTALOL) 0.5 % opthalmic solution Apply 1 drop to eye 2 times daily Left       atorvastatin (LIPITOR) 80 MG tablet Take 1 tablet (80 mg) by mouth daily 90 tablet 3     acetaminophen 650 MG TABS Take 650 mg by mouth every 6 hours 100 tablet 0     travoprost Z, benzalkonium, (TRAVATAN Z) 0.004 % ophthalmic solution Place 1 drop into both eyes At Bedtime        aspirin 81 MG tablet Take 1 tablet (81 mg) by mouth daily 30 tablet      nitroglycerin (NITROSTAT) 0.4 MG SL tablet Place 1 tablet (0.4 mg) under the tongue every 5 minutes as needed for chest pain 25 tablet 3     Allergies   Allergen Reactions     Latex Rash  "    Bees      Macrobid [Nitrofurantoin] Nausea       Reviewed and updated as needed this visit by clinical staff  Tobacco  Allergies  Soc Hx      Reviewed and updated as needed this visit by Provider         ROS:  C: NEGATIVE for fever, chills, change in weight  INTEGUMENTARY/SKIN: see above  MUSCULOSKELETAL: see above  H: NEGATIVE for bleeding problems    OBJECTIVE:                                                    /69 (BP Location: Right arm, Patient Position: Chair, Cuff Size: Adult Regular)  Pulse 68  Temp 98.8  F (37.1  C) (Tympanic)  Resp 16  Ht 5' 2\" (1.575 m)  Wt 172 lb (78 kg)  SpO2 97%  BMI 31.46 kg/m2  Body mass index is 31.46 kg/(m^2).  GEN: alert, oriented x 3, NAD  SKIN: good turgor; on left 2nd interdigital web, there is a 2 mm scaly papule with surrounding mild diffuse erythema but no induration or tenderness, no fluctuance; no erythema migrans  LEFT HAND: no swelling, full range of motion of all joints  Diagnostic test results:  Diagnostic Test Results:  none      ASSESSMENT/PLAN:                                                        ICD-10-CM    1. Tick bite, initial encounter W57.XXXA    No erythema migrans.  Discussed with patient expected possible reactions to tick bites.  No sign suspicious for retained tick part.  Bite site care discussed with patient.  .  Tick bite precautions discussed; printed in AVS.    Follow up with Provider - prn   Patient Instructions     Tick Bite (No Abx)    You have been bitten by a tick. Ticks are small insects that feed on the blood of rodents, rabbits, birds, deer, dogs, and humans. The bite may cause a small local reaction like that of a spider, with a small amount of local redness, itching and slight swelling. Sometimes there is no local reaction.  Most tick bites are harmless, but some ticks carry diseases, such as Lyme disease or [place] spotted fever, that can be passed to people at the time of the bite. Lyme disease is of greatest " concern. Right now you have no symptoms of Lyme disease or other serious reaction to the bite. It is important to watch for the warning signs, which could appear weeks to months after the tick bite.  Home care  The following will help you care for your bite at home:  1. If itching is a problem, avoid tight clothing and anything that heats up your skin (hot showers/baths, direct sunlight). This will tend to make the itching worse. Use ice packs to reduce redness as well as itching.  2. An ice pack (ice cubes in a plastic bag, wrapped in a towel) will reduce local areas of redness and itching. Creams containing benzocaine (available over-the-counter) will reduce itching.  3. If large areas of the skin are involved, and if no other antihistamine was given, you can use an antihistamine with diphenhydramine, which is available at drug stores or groceries. It may be used to reduce itching if large areas of the skin are involved. If symptoms continue, seek the advice of your doctor or pharmacist about over-the-counter medications.  Follow-up care  Follow up with your doctor or as advised.  When to seek medical care  Get prompt medical attention if any of the following occur:  Signs of local infection (during next few days)    Increasing redness around the bite site    Increased pain or swelling    Fever over 100.4 F (38.0 C)    Fluid draining from the bite area  Signs of tick-related disease (during next few weeks to months)    Circular red ring-like rash appears at the bite area within 1 to 3 weeks    Tiredness, fever or chills, nausea or vomiting    Neck pain or stiffness, headache, confusion    Muscle, bone aching    Irregular or rapid heart beat    Joint pain or swelling (especially the knee joint)    Numbness or tingling or weakness in the arms or legs    Weakness on one side of the face    [id number] [employer]. [address] [place] [address], [place] [address]. All rights reserved. This information is not  intended as a substitute for professional medical care. Always follow your healthcare professional's instructions.            Zoran Arvizu MD  [facility]

## 2017-04-26 NOTE — MR AVS SNAPSHOT
After Visit Summary   4/26/2017    Allyson Samuel    MRN: [id number]           Patient Information     Date Of Birth          1945        Visit Information        Provider Department      4/26/2017 2:40 PM Zoran Arvizu MD [facility]        Today's Diagnoses     Tick bite, initial encounter    -  1      Care Instructions      Tick Bite (No Abx)    You have been bitten by a tick. Ticks are small insects that feed on the blood of rodents, rabbits, birds, deer, dogs, and humans. The bite may cause a small local reaction like that of a spider, with a small amount of local redness, itching and slight swelling. Sometimes there is no local reaction.  Most tick bites are harmless, but some ticks carry diseases, such as Lyme disease or [place] spotted fever, that can be passed to people at the time of the bite. Lyme disease is of greatest concern. Right now you have no symptoms of Lyme disease or other serious reaction to the bite. It is important to watch for the warning signs, which could appear weeks to months after the tick bite.  Home care  The following will help you care for your bite at home:  1. If itching is a problem, avoid tight clothing and anything that heats up your skin (hot showers/baths, direct sunlight). This will tend to make the itching worse. Use ice packs to reduce redness as well as itching.  2. An ice pack (ice cubes in a plastic bag, wrapped in a towel) will reduce local areas of redness and itching. Creams containing benzocaine (available over-the-counter) will reduce itching.  3. If large areas of the skin are involved, and if no other antihistamine was given, you can use an antihistamine with diphenhydramine, which is available at drug stores or groceries. It may be used to reduce itching if large areas of the skin are involved. If symptoms continue, seek the advice of your doctor or pharmacist about over-the-counter medications.  Follow-up  care  Follow up with your doctor or as advised.  When to seek medical care  Get prompt medical attention if any of the following occur:  Signs of local infection (during next few days)    Increasing redness around the bite site    Increased pain or swelling    Fever over 100.4 F (38.0 C)    Fluid draining from the bite area  Signs of tick-related disease (during next few weeks to months)    Circular red ring-like rash appears at the bite area within 1 to 3 weeks    Tiredness, fever or chills, nausea or vomiting    Neck pain or stiffness, headache, confusion    Muscle, bone aching    Irregular or rapid heart beat    Joint pain or swelling (especially the knee joint)    Numbness or tingling or weakness in the arms or legs    Weakness on one side of the face    0936-4887 [employer]. [address] [place] [address]. All rights reserved. This information is not intended as a substitute for professional medical care. Always follow your healthcare professional's instructions.              Follow-ups after your visit        Your next 10 appointments already scheduled     May 04, 2017   Procedure with Forest Jim MD   [facility] (--)    [address] [place] [id number] [phone]           The medical center is located at [address] [facility]. (between [address] and [address] in [place], [place] miles north of [place]).              Who to contact     If you have questions or need follow up information about today's clinic visit or your schedule please contact [facility] directly at [phone].  Normal or non-critical lab and imaging results will be communicated to you by MyChart, letter or phone within 4 business days after the clinic has received the results. If you do not hear from us within 7 days, please contact the clinic through MyChart or phone. If you have a critical or abnormal lab result, we will notify you by phone as soon as  "possible.  Submit refill requests through [employer] or call your pharmacy and they will forward the refill request to us. Please allow 3 business days for your refill to be completed.          Additional Information About Your Visit        [employer] Information     [employer] gives you secure access to your electronic health record. If you see a primary care provider, you can also send messages to your care team and make appointments. If you have questions, please call your primary care clinic.  If you do not have a primary care provider, please call [phone] and they will assist you.        Care EveryWhere ID     This is your Care EveryWhere ID. This could be used by other organizations to access your [place] medical records  [id number]        Your Vitals Were     Pulse Temperature Respirations Height Pulse Oximetry BMI (Body Mass Index)    68 98.8  F (37.1  C) (Tympanic) 16 5' 2\" (1.575 m) 97% 31.46 kg/m2       Blood Pressure from Last 3 Encounters:   04/26/17 136/69   04/21/17 156/83   04/11/17 115/75    Weight from Last 3 Encounters:   04/26/17 172 lb (78 kg)   04/21/17 166 lb (75.3 kg)   04/11/17 166 lb (75.3 kg)              Today, you had the following     No orders found for display       Primary Care Provider Office Phone # Fax #    JULIETA Molina [facility] [phone]       [facility] [address] [place] [address]        Thank you!     Thank you for choosing [facility]  for your care. Our goal is always to provide you with excellent care. Hearing back from our patients is one way we can continue to improve our services. Please take a few minutes to complete the written survey that you may receive in the mail after your visit with us. Thank you!             Your Updated Medication List - Protect others around you: Learn how to safely use, store and throw away your medicines at www.disposemymeds.org.          This list is accurate as of: 4/26/17  3:11 PM.  Always " use your most recent med list.                   Brand Name Dispense Instructions for use    acetaminophen 650 MG 8 hour tablet     100 tablet    Take 650 mg by mouth every 6 hours       aspirin 81 MG tablet     30 tablet    Take 1 tablet (81 mg) by mouth daily       atorvastatin 80 MG tablet    LIPITOR    90 tablet    Take 1 tablet (80 mg) by mouth daily       lisinopril 20 MG tablet    PRINIVIL/ZESTRIL    90 tablet    Take 1 tablet (20 mg) by mouth daily       metoprolol 25 MG 24 hr tablet    TOPROL-XL    90 tablet    Take 1 tablet (25 mg) by mouth daily       nitroglycerin 0.4 MG sublingual tablet    NITROSTAT    25 tablet    Place 1 tablet (0.4 mg) under the tongue every 5 minutes as needed for chest pain       timolol maleate 0.5 % opthalmic solution    ISTALOL     Apply 1 drop to eye 2 times daily Left       travoprost Z (benzalkonium) 0.004 % ophthalmic solution    TRAVATAN Z     Place 1 drop into both eyes At Bedtime       ZYRTEC PO      Reported on 4/11/2017

## 2017-04-26 NOTE — NURSING NOTE
"No chief complaint on file.      Initial LMP 02/24/2017 (Exact Date) Estimated body mass index is 23.49 kg/(m^2) as calculated from the following:    Height as of 4/4/17: 5' 7\" (1.702 m).    Weight as of 4/4/17: 150 lb (68 kg).  Medication Reconciliation: complete     Kerry Castillo CMA (AAMA)  "

## 2017-04-26 NOTE — PATIENT INSTRUCTIONS
Tick Bite (No Abx)    You have been bitten by a tick. Ticks are small insects that feed on the blood of rodents, rabbits, birds, deer, dogs, and humans. The bite may cause a small local reaction like that of a spider, with a small amount of local redness, itching and slight swelling. Sometimes there is no local reaction.  Most tick bites are harmless, but some ticks carry diseases, such as Lyme disease or [place] spotted fever, that can be passed to people at the time of the bite. Lyme disease is of greatest concern. Right now you have no symptoms of Lyme disease or other serious reaction to the bite. It is important to watch for the warning signs, which could appear weeks to months after the tick bite.  Home care  The following will help you care for your bite at home:  1. If itching is a problem, avoid tight clothing and anything that heats up your skin (hot showers/baths, direct sunlight). This will tend to make the itching worse. Use ice packs to reduce redness as well as itching.  2. An ice pack (ice cubes in a plastic bag, wrapped in a towel) will reduce local areas of redness and itching. Creams containing benzocaine (available over-the-counter) will reduce itching.  3. If large areas of the skin are involved, and if no other antihistamine was given, you can use an antihistamine with diphenhydramine, which is available at drug stores or groceries. It may be used to reduce itching if large areas of the skin are involved. If symptoms continue, seek the advice of your doctor or pharmacist about over-the-counter medications.  Follow-up care  Follow up with your doctor or as advised.  When to seek medical care  Get prompt medical attention if any of the following occur:  Signs of local infection (during next few days)    Increasing redness around the bite site    Increased pain or swelling    Fever over 100.4 F (38.0 C)    Fluid draining from the bite area  Signs of tick-related disease (during next few  weeks to months)    Circular red ring-like rash appears at the bite area within 1 to 3 weeks    Tiredness, fever or chills, nausea or vomiting    Neck pain or stiffness, headache, confusion    Muscle, bone aching    Irregular or rapid heart beat    Joint pain or swelling (especially the knee joint)    Numbness or tingling or weakness in the arms or legs    Weakness on one side of the face    4741-1180 [employer]. [address] [place] [address], [place] [id number]. All rights reserved. This information is not intended as a substitute for professional medical care. Always follow your healthcare professional's instructions.

## 2017-05-02 ENCOUNTER — ANESTHESIA EVENT (OUTPATIENT)
Dept: SURGERY | Facility: CLINIC | Age: 72
End: 2017-05-02
Payer: MEDICARE

## 2017-05-04 ENCOUNTER — HOSPITAL ENCOUNTER (OUTPATIENT)
Facility: CLINIC | Age: 72
Discharge: HOME | End: 2017-05-04
Attending: UROLOGY | Admitting: UROLOGY
Payer: MEDICARE

## 2017-05-04 ENCOUNTER — ANESTHESIA (OUTPATIENT)
Dept: SURGERY | Facility: CLINIC | Age: 72
End: 2017-05-04
Payer: MEDICARE

## 2017-05-04 ENCOUNTER — APPOINTMENT (OUTPATIENT)
Dept: GENERAL RADIOLOGY | Facility: CLINIC | Age: 72
End: 2017-05-04
Attending: UROLOGY
Payer: MEDICARE

## 2017-05-04 VITALS
RESPIRATION RATE: 16 BRPM | TEMPERATURE: 97.6 F | DIASTOLIC BLOOD PRESSURE: 77 MMHG | SYSTOLIC BLOOD PRESSURE: 155 MMHG | BODY MASS INDEX: 30.55 KG/M2 | HEART RATE: 61 BPM | WEIGHT: 166 LBS | OXYGEN SATURATION: 96 % | HEIGHT: 62 IN

## 2017-05-04 PROCEDURE — 71000027 ZZH RECOVERY PHASE 2 EACH 15 MINS: Performed by: UROLOGY

## 2017-05-04 PROCEDURE — 52005 CYSTO W/URTRL CATHJ: CPT | Performed by: UROLOGY

## 2017-05-04 PROCEDURE — 36000054 ZZH SURGERY LEVEL 2 W FLUORO 1ST 30 MIN: Performed by: UROLOGY

## 2017-05-04 PROCEDURE — 40000277 XR SURGERY CARM FLUORO LESS THAN 5 MIN W STILLS

## 2017-05-04 PROCEDURE — 27210794 ZZH OR GENERAL SUPPLY STERILE: Performed by: UROLOGY

## 2017-05-04 PROCEDURE — 88112 CYTOPATH CELL ENHANCE TECH: CPT | Performed by: UROLOGY

## 2017-05-04 PROCEDURE — 25000125 ZZHC RX 250: Performed by: NURSE ANESTHETIST, CERTIFIED REGISTERED

## 2017-05-04 PROCEDURE — 40000306 ZZH STATISTIC PRE PROC ASSESS II: Performed by: UROLOGY

## 2017-05-04 PROCEDURE — 25000128 H RX IP 250 OP 636: Performed by: NURSE ANESTHETIST, CERTIFIED REGISTERED

## 2017-05-04 PROCEDURE — 25800025 ZZH RX 258: Performed by: NURSE ANESTHETIST, CERTIFIED REGISTERED

## 2017-05-04 PROCEDURE — 36000052 ZZH SURGERY LEVEL 2 EA 15 ADDTL MIN: Performed by: UROLOGY

## 2017-05-04 PROCEDURE — 25500064 ZZH RX 255 OP 636: Performed by: UROLOGY

## 2017-05-04 PROCEDURE — 37000008 ZZH ANESTHESIA TECHNICAL FEE, 1ST 30 MIN: Performed by: UROLOGY

## 2017-05-04 PROCEDURE — 88112 CYTOPATH CELL ENHANCE TECH: CPT | Mod: 26 | Performed by: UROLOGY

## 2017-05-04 PROCEDURE — 37000009 ZZH ANESTHESIA TECHNICAL FEE, EACH ADDTL 15 MIN: Performed by: UROLOGY

## 2017-05-04 PROCEDURE — 25000128 H RX IP 250 OP 636: Performed by: UROLOGY

## 2017-05-04 RX ORDER — DEXAMETHASONE SODIUM PHOSPHATE 4 MG/ML
4 INJECTION, SOLUTION INTRA-ARTICULAR; INTRALESIONAL; INTRAMUSCULAR; INTRAVENOUS; SOFT TISSUE EVERY 10 MIN PRN
Status: DISCONTINUED | OUTPATIENT
Start: 2017-05-04 | End: 2017-05-04 | Stop reason: HOSPADM

## 2017-05-04 RX ORDER — MEPERIDINE HYDROCHLORIDE 25 MG/ML
12.5 INJECTION INTRAMUSCULAR; INTRAVENOUS; SUBCUTANEOUS
Status: DISCONTINUED | OUTPATIENT
Start: 2017-05-04 | End: 2017-05-04 | Stop reason: HOSPADM

## 2017-05-04 RX ORDER — CEFAZOLIN SODIUM 2 G/100ML
2 INJECTION, SOLUTION INTRAVENOUS
Status: COMPLETED | OUTPATIENT
Start: 2017-05-04 | End: 2017-05-04

## 2017-05-04 RX ORDER — LIDOCAINE 40 MG/G
CREAM TOPICAL
Status: DISCONTINUED | OUTPATIENT
Start: 2017-05-04 | End: 2017-05-04 | Stop reason: HOSPADM

## 2017-05-04 RX ORDER — ALBUTEROL SULFATE 0.83 MG/ML
2.5 SOLUTION RESPIRATORY (INHALATION) EVERY 4 HOURS PRN
Status: DISCONTINUED | OUTPATIENT
Start: 2017-05-04 | End: 2017-05-04 | Stop reason: HOSPADM

## 2017-05-04 RX ORDER — SODIUM CHLORIDE, SODIUM LACTATE, POTASSIUM CHLORIDE, CALCIUM CHLORIDE 600; 310; 30; 20 MG/100ML; MG/100ML; MG/100ML; MG/100ML
INJECTION, SOLUTION INTRAVENOUS CONTINUOUS
Status: DISCONTINUED | OUTPATIENT
Start: 2017-05-04 | End: 2017-05-04 | Stop reason: HOSPADM

## 2017-05-04 RX ORDER — PROMETHAZINE HYDROCHLORIDE 25 MG/ML
12.5 INJECTION, SOLUTION INTRAMUSCULAR; INTRAVENOUS
Status: DISCONTINUED | OUTPATIENT
Start: 2017-05-04 | End: 2017-05-04 | Stop reason: HOSPADM

## 2017-05-04 RX ORDER — PHYSOSTIGMINE SALICYLATE 1 MG/ML
1.2 INJECTION INTRAVENOUS
Status: DISCONTINUED | OUTPATIENT
Start: 2017-05-04 | End: 2017-05-04 | Stop reason: HOSPADM

## 2017-05-04 RX ORDER — FENTANYL CITRATE 50 UG/ML
INJECTION, SOLUTION INTRAMUSCULAR; INTRAVENOUS PRN
Status: DISCONTINUED | OUTPATIENT
Start: 2017-05-04 | End: 2017-05-04

## 2017-05-04 RX ORDER — IOPAMIDOL 612 MG/ML
INJECTION, SOLUTION INTRAVASCULAR PRN
Status: DISCONTINUED | OUTPATIENT
Start: 2017-05-04 | End: 2017-05-04 | Stop reason: HOSPADM

## 2017-05-04 RX ORDER — ONDANSETRON 2 MG/ML
4 INJECTION INTRAMUSCULAR; INTRAVENOUS EVERY 30 MIN PRN
Status: DISCONTINUED | OUTPATIENT
Start: 2017-05-04 | End: 2017-05-04 | Stop reason: HOSPADM

## 2017-05-04 RX ORDER — ONDANSETRON 4 MG/1
4 TABLET, ORALLY DISINTEGRATING ORAL EVERY 30 MIN PRN
Status: DISCONTINUED | OUTPATIENT
Start: 2017-05-04 | End: 2017-05-04 | Stop reason: HOSPADM

## 2017-05-04 RX ORDER — FENTANYL CITRATE 50 UG/ML
25-50 INJECTION, SOLUTION INTRAMUSCULAR; INTRAVENOUS
Status: DISCONTINUED | OUTPATIENT
Start: 2017-05-04 | End: 2017-05-04 | Stop reason: HOSPADM

## 2017-05-04 RX ORDER — NALOXONE HYDROCHLORIDE 0.4 MG/ML
.1-.4 INJECTION, SOLUTION INTRAMUSCULAR; INTRAVENOUS; SUBCUTANEOUS
Status: DISCONTINUED | OUTPATIENT
Start: 2017-05-04 | End: 2017-05-04 | Stop reason: HOSPADM

## 2017-05-04 RX ORDER — HYDROMORPHONE HYDROCHLORIDE 1 MG/ML
.3-.5 INJECTION, SOLUTION INTRAMUSCULAR; INTRAVENOUS; SUBCUTANEOUS EVERY 10 MIN PRN
Status: DISCONTINUED | OUTPATIENT
Start: 2017-05-04 | End: 2017-05-04 | Stop reason: HOSPADM

## 2017-05-04 RX ORDER — CEFAZOLIN SODIUM 1 G/3ML
1 INJECTION, POWDER, FOR SOLUTION INTRAMUSCULAR; INTRAVENOUS SEE ADMIN INSTRUCTIONS
Status: DISCONTINUED | OUTPATIENT
Start: 2017-05-04 | End: 2017-05-04 | Stop reason: HOSPADM

## 2017-05-04 RX ORDER — PROPOFOL 10 MG/ML
INJECTION, EMULSION INTRAVENOUS CONTINUOUS PRN
Status: DISCONTINUED | OUTPATIENT
Start: 2017-05-04 | End: 2017-05-04

## 2017-05-04 RX ORDER — LIDOCAINE HYDROCHLORIDE 10 MG/ML
INJECTION, SOLUTION EPIDURAL; INFILTRATION; INTRACAUDAL; PERINEURAL PRN
Status: DISCONTINUED | OUTPATIENT
Start: 2017-05-04 | End: 2017-05-04

## 2017-05-04 RX ADMIN — SODIUM CHLORIDE, POTASSIUM CHLORIDE, SODIUM LACTATE AND CALCIUM CHLORIDE: 600; 310; 30; 20 INJECTION, SOLUTION INTRAVENOUS at 14:52

## 2017-05-04 RX ADMIN — PROPOFOL 50 MCG/KG/MIN: 10 INJECTION, EMULSION INTRAVENOUS at 16:59

## 2017-05-04 RX ADMIN — LIDOCAINE HYDROCHLORIDE 50 MG: 10 INJECTION, SOLUTION EPIDURAL; INFILTRATION; INTRACAUDAL; PERINEURAL at 16:59

## 2017-05-04 RX ADMIN — CEFAZOLIN SODIUM 2 G: 2 INJECTION, SOLUTION INTRAVENOUS at 16:50

## 2017-05-04 RX ADMIN — FENTANYL CITRATE 100 MCG: 50 INJECTION, SOLUTION INTRAMUSCULAR; INTRAVENOUS at 16:56

## 2017-05-04 RX ADMIN — LIDOCAINE HYDROCHLORIDE 1 ML: 10 INJECTION, SOLUTION EPIDURAL; INFILTRATION; INTRACAUDAL; PERINEURAL at 14:53

## 2017-05-04 RX ADMIN — MIDAZOLAM HYDROCHLORIDE 2 MG: 1 INJECTION, SOLUTION INTRAMUSCULAR; INTRAVENOUS at 16:50

## 2017-05-04 ASSESSMENT — LIFESTYLE VARIABLES: TOBACCO_USE: 1

## 2017-05-04 NOTE — ANESTHESIA PREPROCEDURE EVALUATION
Anesthesia Evaluation     . Pt has had prior anesthetic. Type: General    History of anesthetic complications   - PONV        ROS/MED HX    ENT/Pulmonary:     (+)tobacco use, Current use .1 packs/day  , . .    Neurologic:  - neg neurologic ROS     Cardiovascular:     (+) Dyslipidemia, hypertension--CAD, -past MI,-stent,2014  1 Drug Eluting Stent .. : . . . :. .       METS/Exercise Tolerance:  4 - Raking leaves, gardening   Hematologic:  - neg hematologic  ROS       Musculoskeletal:  - neg musculoskeletal ROS       GI/Hepatic:  - neg GI/hepatic ROS       Renal/Genitourinary:     (+) chronic renal disease, type: CRI,       Endo:  - neg endo ROS       Psychiatric:  - neg psychiatric ROS       Infectious Disease:  - neg infectious disease ROS       Malignancy:   (+) Malignancy History of Other  Other CA Urothelial Remission status post Surgery         Other:    - neg other ROS                 Physical Exam  Normal systems: cardiovascular and pulmonary    Airway   Mallampati: II    Dental   (+) chipped and missing    Cardiovascular       Pulmonary                     Anesthesia Plan      History & Physical Review      ASA Status:  3 .    NPO Status:  > 4 hours    Plan for MAC Reason for MAC:  Deep or markedly invasive procedure (G8)         Postoperative Care      Consents  Anesthetic plan, risks, benefits and alternatives discussed with:  Patient..                          .

## 2017-05-04 NOTE — DISCHARGE INSTRUCTIONS
Same Day Surgery Discharge Instructions  Special Precautions After Surgery - Adult    1. It is not unusual to feel lightheaded or faint, up to 24 hours after surgery or while taking pain medication.  If you have these symptoms; sit for a few minutes before standing and have someone assist you when getting up.  2. You should rest and relax for the next 24 hours and must have someone stay with you for at least 24 hours after your discharge.  3. DO NOT DRIVE any vehicle or operate mechanical equipment for 24 hours following the end of your surgery.  DO NOT DRIVE while taking narcotic pain medications that have been prescribed by your physician.  If you had a limb operated on, you must be able to use it fully to drive.  4. DO NOT drink alcoholic beverages for 24 hours following surgery or while taking prescription pain medication.  5. Drink clear liquids (apple juice, ginger ale, broth, 7-Up, etc.).  Progress to your regular diet as you feel able.  6. Any questions call your physician and do not make important decisions for 24 hours.      Surgery Specialty Clinic:  [phone]     Same Day Surgery [phone], Monday thru Friday 6am-9pm.        Notify physician if fever/chills/sweats.  Follow up in urology clinic in 3 months for cystoscopy.

## 2017-05-04 NOTE — ANESTHESIA CARE TRANSFER NOTE
Patient: Allyson Samuel    Procedure(s):  Cystoscopy,Right Retrograde Pyelogram - Wound Class: II-Clean Contaminated    Diagnosis: history og urothelial cancer  Diagnosis Additional Information: No value filed.    Anesthesia Type:   MAC     Note:  Airway :Room Air  Patient transferred to:Phase II        Vitals: (Last set prior to Anesthesia Care Transfer)    CRNA VITALS  5/4/2017 1708 - 5/4/2017 1746      5/4/2017             Pulse: 61    SpO2: 98 %                Electronically Signed By: JULIETA Schmid CRNA  May 4, 2017  5:46 PM

## 2017-05-04 NOTE — ANESTHESIA POSTPROCEDURE EVALUATION
Patient: Allyson Samuel    Procedure(s):  Cystoscopy,Right Retrograde Pyelogram - Wound Class: II-Clean Contaminated    Diagnosis:history og urothelial cancer  Diagnosis Additional Information: No value filed.    Anesthesia Type:  MAC    Note:  Anesthesia Post Evaluation    Patient location during evaluation: Phase 2  Patient participation: Able to fully participate in evaluation  Level of consciousness: awake and alert  Pain management: adequate  Airway patency: patent  Cardiovascular status: acceptable and hemodynamically stable  Respiratory status: acceptable, room air and spontaneous ventilation  Hydration status: acceptable  PONV: none     Anesthetic complications: None          Last vitals:  Vitals:    05/04/17 1405   BP: 158/72   Pulse: 113   Resp: 18   Temp: 36.8  C (98.2  F)   SpO2: 96%         Electronically Signed By: JULIETA Schmid CRNA  May 4, 2017  5:46 PM

## 2017-05-04 NOTE — BRIEF OP NOTE
Pre-op Dx: history of urothelial cancer  Post-op Dx: same  Procedure: cystoscopy,  Right retrograde pyelogram  EBL: 1cc  Specimens: urine for cytology  Drains:none  No complications.

## 2017-05-04 NOTE — IP AVS SNAPSHOT
MRN:[id number]                      After Visit Summary   5/4/2017    Allyson Samuel    MRN: [id number]           Thank you!     Thank you for choosing [facility] for your care. Our goal is always to provide you with excellent care. Hearing back from our patients is one way we can continue to improve our services. Please take a few minutes to complete the written survey that you may receive in the mail after you visit with us. Thank you!        Patient Information     Date Of Birth          1945        About your hospital stay     You were admitted on:  May 4, 2017 You last received care in the:  [facility] PreOP/Phase II    You were discharged on:  May 4, 2017       Who to Call     For medical emergencies, please call 911.  For non-urgent questions about your medical care, please call your primary care provider or clinic, [phone]  For questions related to your surgery, please call your surgery clinic        Attending Provider     Provider Forest Vu MD Urology       Primary Care Provider Office Phone # Fax #    Rita JULIETA Gold [facility] [phone]       [address] [address]        Further instructions from your care team                         Same Day Surgery Discharge Instructions  Special Precautions After Surgery - Adult    1. It is not unusual to feel lightheaded or faint, up to 24 hours after surgery or while taking pain medication.  If you have these symptoms; sit for a few minutes before standing and have someone assist you when getting up.  2. You should rest and relax for the next 24 hours and must have someone stay with you for at least 24 hours after your discharge.  3. DO NOT DRIVE any vehicle or operate mechanical equipment for 24 hours following the end of your surgery.  DO NOT DRIVE while taking narcotic pain medications that have been prescribed by your physician.  If you had a limb operated  "on, you must be able to use it fully to drive.  4. DO NOT drink alcoholic beverages for 24 hours following surgery or while taking prescription pain medication.  5. Drink clear liquids (apple juice, ginger ale, broth, 7-Up, etc.).  Progress to your regular diet as you feel able.  6. Any questions call your physician and do not make important decisions for 24 hours.      Surgery Specialty Clinic:  [phone]     Same Day Surgery [phone], Monday thru Friday 6am-9pm.        Notify physician if fever/chills/sweats.  Follow up in urology clinic in 3 months for cystoscopy.      Pending Results     Date and Time Order Name Status Description    5/4/2017 1723 Cytology non gyn In process     5/4/2017 0614 XR Surgery DELMA Fluoro L/T 5 Min w Stills In process             Admission Information     Date & Time Provider Department Dept. Phone    5/4/2017 Forest iJm MD [facility] PreOP/Phase -[phone]      Your Vitals Were     Blood Pressure Pulse Temperature Respirations Height Weight    158/72 113 98.2  F (36.8  C) (Oral) 18 1.575 m (5' 2\") 75.3 kg (166 lb)    Pulse Oximetry BMI (Body Mass Index)                96% 30.36 kg/m2          [employer] Information     [email or web address] gives you secure access to your electronic health record. If you see a primary care provider, you can also send messages to your care team and make appointments. If you have questions, please call your primary care clinic.  If you do not have a primary care provider, please call [phone] and they will assist you.        Care EveryWhere ID     This is your Care EveryWhere ID. This could be used by other organizations to access your [place] medical records  [id number]           Review of your medicines      CONTINUE these medicines which have NOT CHANGED        Dose / Directions    acetaminophen 650 MG 8 hour tablet   Used for:  Adrenal mass (H)        Dose:  650 mg   Take 650 mg by mouth every 6 hours   Quantity:  100 tablet "   Refills:  0       aspirin 81 MG tablet        Dose:  81 mg   Take 1 tablet (81 mg) by mouth daily   Quantity:  30 tablet   Refills:  0       atorvastatin 80 MG tablet   Commonly known as:  LIPITOR   Used for:  STEMI (ST elevation myocardial infarction) (H), Hyperlipidemia LDL goal <100        Dose:  80 mg   Take 1 tablet (80 mg) by mouth daily   Quantity:  90 tablet   Refills:  3       lisinopril 20 MG tablet   Commonly known as:  PRINIVIL/ZESTRIL   Used for:  History of MI (myocardial infarction)        Dose:  20 mg   Take 1 tablet (20 mg) by mouth daily   Quantity:  90 tablet   Refills:  3       metoprolol 25 MG 24 hr tablet   Commonly known as:  TOPROL-XL   Used for:  History of MI (myocardial infarction)        Dose:  25 mg   Take 1 tablet (25 mg) by mouth daily   Quantity:  90 tablet   Refills:  3       nitroglycerin 0.4 MG sublingual tablet   Commonly known as:  NITROSTAT   Used for:  CAD (coronary artery disease)        Dose:  0.4 mg   Place 1 tablet (0.4 mg) under the tongue every 5 minutes as needed for chest pain   Quantity:  25 tablet   Refills:  3       timolol maleate 0.5 % opthalmic solution   Commonly known as:  ISTALOL        Dose:  1 drop   Apply 1 drop to eye 2 times daily Left   Refills:  0       travoprost Z (benzalkonium) 0.004 % ophthalmic solution   Commonly known as:  TRAVATAN Z        Dose:  1 drop   Place 1 drop into both eyes At Bedtime   Refills:  0       ZYRTEC PO        Reported on 4/11/2017   Refills:  0                Protect others around you: Learn how to safely use, store and throw away your medicines at www.disposemymeds.org.             Medication List: This is a list of all your medications and when to take them. Check marks below indicate your daily home schedule. Keep this list as a reference.      Medications           Morning Afternoon Evening Bedtime As Needed    acetaminophen 650 MG 8 hour tablet   Take 650 mg by mouth every 6 hours                                aspirin  81 MG tablet   Take 1 tablet (81 mg) by mouth daily                                atorvastatin 80 MG tablet   Commonly known as:  LIPITOR   Take 1 tablet (80 mg) by mouth daily                                lisinopril 20 MG tablet   Commonly known as:  PRINIVIL/ZESTRIL   Take 1 tablet (20 mg) by mouth daily                                metoprolol 25 MG 24 hr tablet   Commonly known as:  TOPROL-XL   Take 1 tablet (25 mg) by mouth daily                                nitroglycerin 0.4 MG sublingual tablet   Commonly known as:  NITROSTAT   Place 1 tablet (0.4 mg) under the tongue every 5 minutes as needed for chest pain                                timolol maleate 0.5 % opthalmic solution   Commonly known as:  ISTALOL   Apply 1 drop to eye 2 times daily Left                                travoprost Z (benzalkonium) 0.004 % ophthalmic solution   Commonly known as:  TRAVATAN Z   Place 1 drop into both eyes At Bedtime                                ZYRTEC PO   Reported on 4/11/2017

## 2017-05-04 NOTE — IP AVS SNAPSHOT
[facility] PreOP/Phase II    [address] [facility] [address]    Phone:  [phone]    Fax:  [phone]                                       After Visit Summary   5/4/2017    Allyson Samuel    MRN: [id number]           After Visit Summary Signature Page     I have received my discharge instructions, and my questions have been answered. I have discussed any challenges I see with this plan with the nurse or doctor.    ..........................................................................................................................................  Patient/Patient Representative Signature      ..........................................................................................................................................  Patient Representative Print Name and Relationship to Patient    ..................................................               ................................................  Date                                            Time    ..........................................................................................................................................  Reviewed by Signature/Title    ...................................................              ..............................................  Date                                                            Time

## 2017-05-05 LAB — COPATH REPORT: NORMAL

## 2017-05-05 NOTE — OP NOTE
PREOPERATIVE DIAGNOSIS:  History of urothelial carcinoma.      POSTOPERATIVE DIAGNOSIS:  History of urothelial carcinoma.      PROCEDURES:     1.  Cystoscopy.   2.  Right retrograde pyelogram.   3.  Fluoroscopy and interpretation of fluoroscopic images.      SURGEON:  Forest Jim MD      ESTIMATED BLOOD LOSS:  1 cc.      SPECIMENS:  Bladder urine for cytology.        DRAINS:  None.        INTRAOPERATIVE COMPLICATIONS:  None.      INDICATIONS:  The patient Allyson Samuel is a 72-year-old woman followed in our clinic with a history of left upper tract TCC for which she underwent a nephroureterectomy.  The patient has had recurrence of cancer in the bladder and presents today for surveillance cystoscopy and retrograde pyelogram on the right side.      DESCRIPTION OF PROCEDURE:  After Informed Consent was obtained from the patient she was brought to the operating room on 05/04/2017 where she was administered MAC anesthesia.  After a suitable level of anesthesia was obtained she was placed in the lithotomy position with all pressure points padded.  She was administered preoperative antibiotics.  She was prepped and draped in the standard sterile fashion.        A 22 Palestinian cystoscope was introduced into the patient's bladder without difficulty.  Inspection of the bladder revealed the left ureteral orifice was surgically absent.  The right ureteral orifice was orthotopic and was effluxing clear urine.  There were no mucosal lesions, stones or foreign objects noted in the bladder.        The right ureteral orifice was intubated with a 5 Palestinian open-ended catheter.  A retrograde pyelogram was performed which revealed a normal-caliber ureter throughout the entire length and normal sharp- appearing calices without filling defects in the renal pelvis or ureter.  There was efflux after removal of the catheter.        The patient's bladder was drained.  She was awakened and brought to the Recovery Room in stable  condition.         KATTY FERMIN MD             D: 2017 17:32   T: 2017 07:05   MT: LA#[id number]      Name:     SELMA WELLER   MRN:      [id number]        Account:        [id number]   :      1945           Procedure Date: 2017      Document: [id number]       cc: Rita Massey NP

## 2017-05-09 ENCOUNTER — TELEPHONE (OUTPATIENT)
Dept: UROLOGY | Facility: CLINIC | Age: 72
End: 2017-05-09

## 2017-05-09 NOTE — TELEPHONE ENCOUNTER
Called patient and left , requested call back. Clinic number provided.       Dr. Jim would like to see patient back in 3 months for a cysto. Please help schedule when she calls back.     Nini Brian MA

## 2017-05-09 NOTE — TELEPHONE ENCOUNTER
----- Message from Forest Jim MD sent at 5/4/2017  5:42 PM CDT -----  Please schedule patient for a cysto in 3 months.  Thanks.

## 2017-08-16 ENCOUNTER — OFFICE VISIT (OUTPATIENT)
Dept: UROLOGY | Facility: CLINIC | Age: 72
End: 2017-08-16
Payer: MEDICARE

## 2017-08-16 VITALS
HEART RATE: 70 BPM | OXYGEN SATURATION: 95 % | BODY MASS INDEX: 30.36 KG/M2 | SYSTOLIC BLOOD PRESSURE: 163 MMHG | WEIGHT: 166 LBS | RESPIRATION RATE: 16 BRPM | DIASTOLIC BLOOD PRESSURE: 79 MMHG

## 2017-08-16 DIAGNOSIS — C68.9 UROTHELIAL CANCER (H): ICD-10-CM

## 2017-08-16 PROCEDURE — 88112 CYTOPATH CELL ENHANCE TECH: CPT | Performed by: UROLOGY

## 2017-08-16 PROCEDURE — 88112 CYTOPATH CELL ENHANCE TECH: CPT | Mod: 26 | Performed by: UROLOGY

## 2017-08-16 PROCEDURE — 52000 CYSTOURETHROSCOPY: CPT | Performed by: UROLOGY

## 2017-08-16 NOTE — MR AVS SNAPSHOT
After Visit Summary   8/16/2017    Allyson Samuel    MRN: [id number]           Patient Information     Date Of Birth          1945        Visit Information        Provider Department      8/16/2017 10:45 AM Forest Jim MD; [place] [facility] 1 [facility]        Today's Diagnoses     Urothelial cancer (H)           Follow-ups after your visit        Your next 10 appointments already scheduled     Feb 14, 2018 11:00 AM CST   Return Visit with Forest Jim MD   [facility]    [address] [address]   [phone]              Who to contact     If you have questions or need follow up information about today's clinic visit or your schedule please contact [facility] directly at [phone].  Normal or non-critical lab and imaging results will be communicated to you by MyChart, letter or phone within 4 business days after the clinic has received the results. If you do not hear from us within 7 days, please contact the clinic through [employer] or phone. If you have a critical or abnormal lab result, we will notify you by phone as soon as possible.  Submit refill requests through [employer] or call your pharmacy and they will forward the refill request to us. Please allow 3 business days for your refill to be completed.          Additional Information About Your Visit        MyChart Information     [employer] gives you secure access to your electronic health record. If you see a primary care provider, you can also send messages to your care team and make appointments. If you have questions, please call your primary care clinic.  If you do not have a primary care provider, please call [phone] and they will assist you.        Care EveryWhere ID     This is your Care EveryWhere ID. This could be used by other organizations to access your [facility] medical records  [id number]        Your  Vitals Were     Pulse Respirations Pulse Oximetry BMI (Body Mass Index)          70 16 95% 30.36 kg/m2         Blood Pressure from Last 3 Encounters:   08/16/17 163/79   05/04/17 155/77   04/26/17 136/69    Weight from Last 3 Encounters:   08/16/17 75.3 kg (166 lb)   05/04/17 75.3 kg (166 lb)   04/26/17 78 kg (172 lb)              We Performed the Following     CYSTOURETHROSCOPY ([id number])     Cytology non gyn        Primary Care Provider Office Phone # Fax #    Rita Masesy, JULIETA [phone]       [address] [facility] [address]        Equal Access to Services     GERA CISNEROS : Guzman Mcclure, wabushrada ginger, qaybta kaalmada adestefanieyaalicia, herrera lechuga . So [facility] [phone].    ATENCIÓN: Si habla español, tiene a bess disposición servicios gratuitos de asistencia lingüística. Llame al [phone].    We comply with applicable federal civil rights laws and [place] laws. We do not discriminate on the basis of race, color, national origin, age, disability sex, sexual orientation or gender identity.            Thank you!     Thank you for choosing [facility]  for your care. Our goal is always to provide you with excellent care. Hearing back from our patients is one way we can continue to improve our services. Please take a few minutes to complete the written survey that you may receive in the mail after your visit with us. Thank you!             Your Updated Medication List - Protect others around you: Learn how to safely use, store and throw away your medicines at www.disposemymeds.org.          This list is accurate as of: 8/16/17  1:07 PM.  Always use your most recent med list.                   Brand Name Dispense Instructions for use Diagnosis    acetaminophen 650 MG 8 hour tablet     100 tablet    Take 650 mg by mouth every 6 hours    Adrenal mass (H)       aspirin 81 MG tablet     30 tablet    Take 1 tablet (81 mg) by mouth daily         atorvastatin 80 MG tablet    LIPITOR    90 tablet    Take 1 tablet (80 mg) by mouth daily    STEMI (ST elevation myocardial infarction) (H), Hyperlipidemia LDL goal <100       lisinopril 20 MG tablet    PRINIVIL/ZESTRIL    90 tablet    Take 1 tablet (20 mg) by mouth daily    History of MI (myocardial infarction)       metoprolol 25 MG 24 hr tablet    TOPROL-XL    90 tablet    Take 1 tablet (25 mg) by mouth daily    History of MI (myocardial infarction)       nitroGLYcerin 0.4 MG sublingual tablet    NITROSTAT    25 tablet    Place 1 tablet (0.4 mg) under the tongue every 5 minutes as needed for chest pain    CAD (coronary artery disease)       timolol maleate 0.5 % opthalmic solution    ISTALOL     Apply 1 drop to eye 2 times daily Left        travoprost Z (benzalkonium) 0.004 % ophthalmic solution    TRAVATAN Z     Place 1 drop into both eyes At Bedtime

## 2017-08-16 NOTE — PROGRESS NOTES
REASON FOR VISIT TODAY:  Cystoscopy for history of urothelial cancer.      HISTORY:  Ms. Selma Samuel is a 72-year-old woman followed in our clinic for history of urothelial carcinoma.  The patient has a history of left side upper tract TCC for which she underwent a robotic-assisted laparoscopic nephroureterectomy on 2015.  Final pathology demonstrated high grade T1 disease with negative margins.  She had a bladder recurrence in 2015 showing mostly low-grade but a small area of focal high grade disease.  She had another recurrence in 2016 consistent with low-grade noninvasive urothelial carcinoma.  The patient underwent fulguration of 2 other small lesions in the bladder in 2016 and had a biopsy fulguration of small papillary lesion in 2017 that also came back consistent with low-grade disease.  The lesion of note was quite small.  The patient comes in today for surveillance cystoscopy.  She notes no blood in her urine since we last saw her.      PROCEDURE NOTE:  After informed consent was obtained, the patient was prepped and draped in standard sterile fashion.  A flexible cystoscope was introduced into the patient's bladder without difficulty.  The right UO was seen effluxing clear urine.  The left UO was again surgically absent.  There were no mucosal lesions, stones or foreign objects noted in the patient's bladder.  The urethra was normal to inspection.  The patient tolerated the procedure without difficulty.      ASSESSMENT AND PLAN:  I suggested to Ms. Samuel that we will see her back in 6 months' time for her next surveillance cystoscopy.  We also sent the urine today for cytology.  The patient will call us back next week to go over the results.         KATTY FERMIN MD             D: 2017 13:06   T: 2017 15:43   MT: LA#[id number]      Name:     SELMA SAMUEL   MRN:      [id number]        Account:      [id number]   :      1945           Visit Date:   2017      Document:  [id number]

## 2017-08-16 NOTE — NURSING NOTE
"Chief Complaint   Patient presents with     RECHECK     bladder CA       Initial /79 (BP Location: Left arm, Patient Position: Chair)  Pulse 70  Resp 16  Wt 166 lb (75.3 kg)  SpO2 95%  BMI 30.36 kg/m2 Estimated body mass index is 30.36 kg/(m^2) as calculated from the following:    Height as of 5/4/17: 5' 2\" (1.575 m).    Weight as of this encounter: 166 lb (75.3 kg).  Medication Reconciliation: complete   Donna Massey LPN    "

## 2017-08-18 LAB — COPATH REPORT: NORMAL

## 2017-09-18 ENCOUNTER — TRANSFERRED RECORDS (OUTPATIENT)
Dept: HEALTH INFORMATION MANAGEMENT | Facility: CLINIC | Age: 72
End: 2017-09-18

## 2018-03-08 DIAGNOSIS — I25.2 HISTORY OF MI (MYOCARDIAL INFARCTION): ICD-10-CM

## 2018-03-08 NOTE — TELEPHONE ENCOUNTER
"Requested Prescriptions   Pending Prescriptions Disp Refills     lisinopril (PRINIVIL/ZESTRIL) 20 MG tablet  Last Written Prescription Date:  09/28/17  Last Fill Quantity: 30,  # refills: 0   Last office visit: 4/26/2017 with prescribing provider:  04/26/17   Future Office Visit:   Next 5 appointments (look out 90 days)     Mar 14, 2018  2:00 PM CDT   [facility] Visit with Forest Jim MD   [facility]    [address] [facility] [address]   [phone]               30 tablet 0     Sig: Take 1 tablet (20 mg) by mouth daily    ACE Inhibitors (Including Combos) Protocol Failed    3/8/2018  7:53 AM       Failed - Blood pressure under 140/90 in past 12 months    BP Readings from Last 3 Encounters:   08/16/17 163/79   05/04/17 155/77   04/26/17 136/69          Failed - Normal serum creatinine on file in past 12 months    Recent Labs   Lab Test  04/11/17   1103   CR  1.07*          Passed - Recent (12 mo) or future (30 days) visit within the authorizing provider's specialty    Patient had office visit in the last year or has a visit in the next 30 days with authorizing provider.  See \"Patient Info\" tab in inbasket, or \"Choose Columns\" in Meds & Orders section of the refill encounter.        Passed - Patient is age 18 or older       Passed - No active pregnancy on record       Passed - Normal serum potassium on file in past 12 months    Recent Labs   Lab Test  04/11/17   1103   POTASSIUM  4.4          Passed - No positive pregnancy test in past 12 months        metoprolol succinate (TOPROL-XL) 25 MG 24 hr tablet  Last Written Prescription Date:  09/28/17  Last Fill Quantity: 30,  # refills: 0   Last office visit: 4/26/2017 with prescribing provider:  04/26/17   Future Office Visit:   Next 5 appointments (look out 90 days)     Mar 14, 2018  2:00 PM CDT   Return Visit with Forest Jim MD   [facility]    " "[address]  [facility] [address]   [phone]               30 tablet 0     Sig: Take 1 tablet (25 mg) by mouth daily    Beta-Blockers Protocol Failed    3/8/2018  7:53 AM       Failed - Blood pressure under 140/90 in past 12 months    BP Readings from Last 3 Encounters:   08/16/17 163/79   05/04/17 155/77   04/26/17 136/69          Passed - Patient is age 6 or older       Passed - Recent (12 mo) or future (30 days) visit within the authorizing provider's specialty    Patient had office visit in the last year or has a visit in the next 30 days with authorizing provider.  See \"Patient Info\" tab in inbasket, or \"Choose Columns\" in Meds & Orders section of the refill encounter.           "

## 2018-03-09 RX ORDER — LISINOPRIL 20 MG/1
20 TABLET ORAL DAILY
Qty: 30 TABLET | Refills: 0 | Status: SHIPPED | OUTPATIENT
Start: 2018-03-09 | End: 2018-11-19

## 2018-03-09 RX ORDER — METOPROLOL SUCCINATE 25 MG/1
25 TABLET, EXTENDED RELEASE ORAL DAILY
Qty: 30 TABLET | Refills: 0 | Status: SHIPPED | OUTPATIENT
Start: 2018-03-09 | End: 2018-11-19

## 2018-03-09 NOTE — TELEPHONE ENCOUNTER
Sent 30 days.  Please schedule the medicare physical before needing next prescription.    Covering for provider  Mina Mansfield MD  [facility]

## 2018-03-09 NOTE — TELEPHONE ENCOUNTER
Creatinine   Date Value Ref Range Status   04/11/2017 1.07 (H) 0.52 - 1.04 mg/dL Final   ]  BP Readings from Last 6 Encounters:   08/16/17 163/79   05/04/17 155/77   04/26/17 136/69   04/21/17 156/83   04/11/17 115/75   02/08/17 119/81

## 2018-03-14 ENCOUNTER — OFFICE VISIT (OUTPATIENT)
Dept: UROLOGY | Facility: CLINIC | Age: 73
End: 2018-03-14
Payer: MEDICARE

## 2018-03-14 VITALS — DIASTOLIC BLOOD PRESSURE: 93 MMHG | SYSTOLIC BLOOD PRESSURE: 146 MMHG | HEART RATE: 78 BPM | RESPIRATION RATE: 16 BRPM

## 2018-03-14 DIAGNOSIS — Z85.51 PERSONAL HISTORY OF MALIGNANT NEOPLASM OF BLADDER: Primary | ICD-10-CM

## 2018-03-14 PROCEDURE — 88112 CYTOPATH CELL ENHANCE TECH: CPT | Performed by: UROLOGY

## 2018-03-14 PROCEDURE — 88112 CYTOPATH CELL ENHANCE TECH: CPT | Mod: 26 | Performed by: UROLOGY

## 2018-03-14 PROCEDURE — 52000 CYSTOURETHROSCOPY: CPT | Performed by: UROLOGY

## 2018-03-14 NOTE — PROGRESS NOTES
Visit Date:   2018      REASON FOR VISIT TODAY:  Bladder cancer.      HISTORY:  Ms. Samuel is a 73-year-old woman followed in our clinic for history of upper tract urothelial cancer as well as bladder cancer.  The patient underwent a robotic-assisted laparoscopic nephroureterectomy on the left on 2015.  Final pathology demonstrated high-grade T1 disease with negative margins.  She had a bladder recurrence in 2015 as well as 2016 and a small superficial lesion in 2016 as well as 2017.  These were all essentially low-grade recurrences.  The patient presents today for surveillance cystoscopy.  She notes no blood in the urine since we last saw her.      PROCEDURE NOTE:  After informed consent was obtained, the patient was prepped and draped in standard sterile fashion.  A flexible cystoscope was introduced into the patient's bladder without difficulty.  Inspection of the bladder revealed the right UO to be orthotopic and left UO surgically absent.  There were no mucosal lesions, stones or foreign objects noted in the patient's bladder.  Urethra was normal to inspection upon withdrawal of the scope.      ASSESSMENT AND PLAN:  Ms. Samuel left us a urine specimen, which we will send for cytology today and she will call us back next week for the result.  Otherwise, we will see the patient back in 6 months for her next surveillance cystoscopy.         KATTY FERMIN MD             D: 2018   T: 2018   MT: MOE      Name:     SELMA SAMUEL   MRN:      [id number]        Account:      [id number]   :      1945           Visit Date:   2018      Document: [id number]

## 2018-03-14 NOTE — MR AVS SNAPSHOT
After Visit Summary   3/14/2018    Allyson Samuel    MRN: [id number]           Patient Information     Date Of Birth          1945        Visit Information        Provider Department      3/14/2018 2:00 PM Forets Jim MD [facility]        Today's Diagnoses     Personal history of malignant neoplasm of bladder    -  1       Follow-ups after your visit        Your next 10 appointments already scheduled     Sep 26, 2018 10:45 AM CDT   CYSTO with Forest Jim MD   [facility] ([facility]    [address] [address]   [phone]              Who to contact     If you have questions or need follow up information about today's clinic visit or your schedule please contact [facility] directly at [phone].  Normal or non-critical lab and imaging results will be communicated to you by MyChart, letter or phone within 4 business days after the clinic has received the results. If you do not hear from us within 7 days, please contact the clinic through MyChart or phone. If you have a critical or abnormal lab result, we will notify you by phone as soon as possible.  Submit refill requests through [employer] or call your pharmacy and they will forward the refill request to us. Please allow 3 business days for your refill to be completed.          Additional Information About Your Visit        MyChart Information     [employer] gives you secure access to your electronic health record. If you see a primary care provider, you can also send messages to your care team and make appointments. If you have questions, please call your primary care clinic.  If you do not have a primary care provider, please call [phone] and they will assist you.        Care EveryWhere ID     This is your Care EveryWhere ID. This could be used by other organizations to access your [facility] medical records  [id number]        Your  Vitals Were     Pulse Respirations                78 16           Blood Pressure from Last 3 Encounters:   03/14/18 (!) 146/93   08/16/17 163/79   05/04/17 155/77    Weight from Last 3 Encounters:   08/16/17 75.3 kg (166 lb)   05/04/17 75.3 kg (166 lb)   04/26/17 78 kg (172 lb)              We Performed the Following     CYSTOURETHROSCOPY     Cytology non gyn        Primary Care Provider Office Phone # Fax #    Rita Massey, APRN [phone] [address] [facility] [address]        Equal Access to Services     GERA CISNEROS : Hadii janneth lopez hadasho Sojazmyn, waaxda luqadaha, qaybta kaalmada adeegyada, herrera lechuga . So [facility] [phone].    ATENCIÓN: Si habla español, tiene a bess disposición servicios gratuitos de asistencia lingüística. Llame al [phone].    We comply with applicable federal civil rights laws and [place] laws. We do not discriminate on the basis of race, color, national origin, age, disability, sex, sexual orientation, or gender identity.            Thank you!     Thank you for choosing [facility]  for your care. Our goal is always to provide you with excellent care. Hearing back from our patients is one way we can continue to improve our services. Please take a few minutes to complete the written survey that you may receive in the mail after your visit with us. Thank you!             Your Updated Medication List - Protect others around you: Learn how to safely use, store and throw away your medicines at www.disposemymeds.org.          This list is accurate as of 3/14/18 11:59 PM.  Always use your most recent med list.                   Brand Name Dispense Instructions for use Diagnosis    acetaminophen 650 MG 8 hour tablet     100 tablet    Take 650 mg by mouth every 6 hours    Adrenal mass (H)       aspirin 81 MG tablet     30 tablet    Take 1 tablet (81 mg) by mouth daily        atorvastatin 80 MG tablet    LIPITOR    90  tablet    Take 1 tablet (80 mg) by mouth daily    STEMI (ST elevation myocardial infarction) (H), Hyperlipidemia LDL goal <100       lisinopril 20 MG tablet    PRINIVIL/ZESTRIL    30 tablet    Take 1 tablet (20 mg) by mouth daily    History of MI (myocardial infarction)       metoprolol succinate 25 MG 24 hr tablet    TOPROL-XL    30 tablet    Take 1 tablet (25 mg) by mouth daily (Needs office visit)    History of MI (myocardial infarction)       nitroGLYcerin 0.4 MG sublingual tablet    NITROSTAT    25 tablet    Place 1 tablet (0.4 mg) under the tongue every 5 minutes as needed for chest pain    CAD (coronary artery disease)       timolol maleate 0.5 % opthalmic solution    ISTALOL     Apply 1 drop to eye 2 times daily Left        travoprost Z (benzalkonium) 0.004 % ophthalmic solution    TRAVATAN Z     Place 1 drop into both eyes At Bedtime

## 2018-03-14 NOTE — NURSING NOTE
"Chief Complaint   Patient presents with     Cystoscopy       Initial BP (!) 146/93 (BP Location: Left arm, Patient Position: Chair, Cuff Size: Adult Regular)  Pulse 78  Resp 16 Estimated body mass index is 30.36 kg/(m^2) as calculated from the following:    Height as of 5/4/17: 1.575 m (5' 2\").    Weight as of 8/16/17: 75.3 kg (166 lb).  Medication Reconciliation: complete.  rey larios LPN      "

## 2018-03-16 LAB — COPATH REPORT: NORMAL

## 2018-03-20 ENCOUNTER — TELEPHONE (OUTPATIENT)
Dept: UROLOGY | Facility: CLINIC | Age: 73
End: 2018-03-20

## 2018-04-24 ENCOUNTER — TRANSFERRED RECORDS (OUTPATIENT)
Dept: HEALTH INFORMATION MANAGEMENT | Facility: CLINIC | Age: 73
End: 2018-04-24

## 2018-09-26 ENCOUNTER — OFFICE VISIT (OUTPATIENT)
Dept: UROLOGY | Facility: CLINIC | Age: 73
End: 2018-09-26
Payer: MEDICARE

## 2018-09-26 ENCOUNTER — ALLIED HEALTH/NURSE VISIT (OUTPATIENT)
Dept: FAMILY MEDICINE | Facility: CLINIC | Age: 73
End: 2018-09-26
Payer: MEDICARE

## 2018-09-26 VITALS — DIASTOLIC BLOOD PRESSURE: 88 MMHG | HEART RATE: 80 BPM | RESPIRATION RATE: 18 BRPM | SYSTOLIC BLOOD PRESSURE: 170 MMHG

## 2018-09-26 DIAGNOSIS — Z23 NEED FOR PROPHYLACTIC VACCINATION AND INOCULATION AGAINST INFLUENZA: Primary | ICD-10-CM

## 2018-09-26 DIAGNOSIS — C68.9 UROTHELIAL CANCER (H): Primary | ICD-10-CM

## 2018-09-26 PROCEDURE — 52000 CYSTOURETHROSCOPY: CPT | Performed by: UROLOGY

## 2018-09-26 PROCEDURE — G0008 ADMIN INFLUENZA VIRUS VAC: HCPCS

## 2018-09-26 PROCEDURE — 88112 CYTOPATH CELL ENHANCE TECH: CPT | Performed by: UROLOGY

## 2018-09-26 PROCEDURE — 90662 IIV NO PRSV INCREASED AG IM: CPT

## 2018-09-26 PROCEDURE — 88112 CYTOPATH CELL ENHANCE TECH: CPT | Mod: 26 | Performed by: UROLOGY

## 2018-09-26 NOTE — MR AVS SNAPSHOT
After Visit Summary   9/26/2018    Allyson Samuel    MRN: [id number]           Patient Information     Date Of Birth          1945        Visit Information        Provider Department      9/26/2018 10:45 AM Forest Jim MD [facility]        Today's Diagnoses     Urothelial cancer (H)    -  1       Follow-ups after your visit        Your next 10 appointments already scheduled     Nov 19, 2018 10:00 AM CST   Return Visit with Lori White MD   [facility] ([facility])    [address] [address]   [phone]            Mar 27, 2019 10:00 AM CDT   CYSTO with Forest Jim MD   [facility] ([facility])    [address] [address]   [phone]              Who to contact     If you have questions or need follow up information about today's clinic visit or your schedule please contact [facility] directly at [phone].  Normal or non-critical lab and imaging results will be communicated to you by eegoeshart, letter or phone within 4 business days after the clinic has received the results. If you do not hear from us within 7 days, please contact the clinic through SummitIGt or phone. If you have a critical or abnormal lab result, we will notify you by phone as soon as possible.  Submit refill requests through AnaptysBio or call your pharmacy and they will forward the refill request to us. Please allow 3 business days for your refill to be completed.          Additional Information About Your Visit        eegoeshart Information     An[employer] gives you secure access to your electronic health record. If you see a primary care provider, you can also send messages to your care team and make appointments. If you have questions, please call your primary care clinic.  If you do not have a primary care provider, please call [phone] and they  will assist you.        Care EveryWhere ID     This is your Care EveryWhere ID. This could be used by other organizations to access your [facility] medical records  [id number]        Your Vitals Were     Pulse Respirations                80 18           Blood Pressure from Last 3 Encounters:   09/26/18 170/88   03/14/18 (!) 146/93   08/16/17 163/79    Weight from Last 3 Encounters:   08/16/17 75.3 kg (166 lb)   05/04/17 75.3 kg (166 lb)   04/26/17 78 kg (172 lb)              We Performed the Following     CYSTOURETHROSCOPY     Cytology non gyn        Primary Care Provider Office Phone # Fax #    Rita Massey, APRN [phone] [address] [address]        Equal Access to Services     GERA CISNEROS : Hadii janneth orellanao Sojazmyn, waaxda luqadaha, qaybta kaalmada adeegyada, herrera lechuga . So [facility] [phone].    ATENCIÓN: Si habla español, tiene a bess disposición servicios gratuitos de asistencia lingüística. Llame al [phone].    We comply with applicable federal civil rights laws and [place] laws. We do not discriminate on the basis of race, color, national origin, age, disability, sex, sexual orientation, or gender identity.            Thank you!     Thank you for choosing [facility]  for your care. Our goal is always to provide you with excellent care. Hearing back from our patients is one way we can continue to improve our services. Please take a few minutes to complete the written survey that you may receive in the mail after your visit with us. Thank you!             Your Updated Medication List - Protect others around you: Learn how to safely use, store and throw away your medicines at www.disposemymeds.org.          This list is accurate as of 9/26/18 11:59 PM.  Always use your most recent med list.                   Brand Name Dispense Instructions for use Diagnosis    acetaminophen 650 MG 8 hour tablet     100 tablet     Take 650 mg by mouth every 6 hours    Adrenal mass (H)       aspirin 81 MG tablet     30 tablet    Take 1 tablet (81 mg) by mouth daily        atorvastatin 80 MG tablet    LIPITOR    90 tablet    Take 1 tablet (80 mg) by mouth daily    STEMI (ST elevation myocardial infarction) (H), Hyperlipidemia LDL goal <100       lisinopril 20 MG tablet    PRINIVIL/ZESTRIL    30 tablet    Take 1 tablet (20 mg) by mouth daily    History of MI (myocardial infarction)       metoprolol succinate 25 MG 24 hr tablet    TOPROL-XL    30 tablet    Take 1 tablet (25 mg) by mouth daily (Needs office visit)    History of MI (myocardial infarction)       nitroGLYcerin 0.4 MG sublingual tablet    NITROSTAT    25 tablet    Place 1 tablet (0.4 mg) under the tongue every 5 minutes as needed for chest pain    CAD (coronary artery disease)       timolol maleate 0.5 % opthalmic solution    ISTALOL     Apply 1 drop to eye 2 times daily Left        travoprost Z (benzalkonium) 0.004 % ophthalmic solution    TRAVATAN Z     Place 1 drop into both eyes At Bedtime

## 2018-09-26 NOTE — NURSING NOTE
"Initial /88 (BP Location: Right arm, Patient Position: Chair, Cuff Size: Adult Regular)  Pulse 80  Resp 18 Estimated body mass index is 30.36 kg/(m^2) as calculated from the following:    Height as of 5/4/17: 1.575 m (5' 2\").    Weight as of 8/16/17: 75.3 kg (166 lb). .    Patient here for a cysto.  rey larios LPN    "

## 2018-09-26 NOTE — MR AVS SNAPSHOT
After Visit Summary   9/26/2018    Allyson Samuel    MRN: [id number]           Patient Information     Date Of Birth          1945        Visit Information        Provider Department      9/26/2018 12:00 PM [facility]        Today's Diagnoses     Need for prophylactic vaccination and inoculation against influenza    -  1       Follow-ups after your visit        Your next 10 appointments already scheduled     Mar 27, 2019 10:00 AM CDT   CYSTO with Forest Jim MD   [facility] ([facility])    [address] [address]   [phone]              Who to contact     If you have questions or need follow up information about today's clinic visit or your schedule please contact [facility] directly at [phone].  Normal or non-critical lab and imaging results will be communicated to you by MyChart, letter or phone within 4 business days after the clinic has received the results. If you do not hear from us within 7 days, please contact the clinic through MyChart or phone. If you have a critical or abnormal lab result, we will notify you by phone as soon as possible.  Submit refill requests through Amperion or call your pharmacy and they will forward the refill request to us. Please allow 3 business days for your refill to be completed.          Additional Information About Your Visit        MyChart Information     Am[employer] gives you secure access to your electronic health record. If you see a primary care provider, you can also send messages to your care team and make appointments. If you have questions, please call your primary care clinic.  If you do not have a primary care provider, please call [phone] and they will assist you.        Care EveryWhere ID     This is your Care EveryWhere ID. This could be used by other organizations to access your [facility] medical records  [id number]          Blood Pressure from Last 3 Encounters:   09/26/18 170/88   03/14/18 (!) 146/93   08/16/17 163/79    Weight from Last 3 Encounters:   08/16/17 166 lb (75.3 kg)   05/04/17 166 lb (75.3 kg)   04/26/17 172 lb (78 kg)              We Performed the Following     ADMIN INFLUENZA (For MEDICARE Patients ONLY) []     FLU VACCINE, INCREASED ANTIGEN, PRESV FREE, AGE 65+ [28523]        Primary Care Provider Office Phone # Fax #    JULIETA Molina [facility] [phone] [address] [facility] [address]        Equal Access to Services     GERA CISNEROS : Guzman Mcclure, wabushrada ginger, qaybta kaalmada tonya, herrera lechuga . So [facility] [phone].    ATENCIÓN: Si habla español, tiene a bess disposición servicios gratuitos de asistencia lingüística. Llame al [phone].    We comply with applicable federal civil rights laws and [place] laws. We do not discriminate on the basis of race, color, national origin, age, disability, sex, sexual orientation, or gender identity.            Thank you!     Thank you for choosing [facility]  for your care. Our goal is always to provide you with excellent care. Hearing back from our patients is one way we can continue to improve our services. Please take a few minutes to complete the written survey that you may receive in the mail after your visit with us. Thank you!             Your Updated Medication List - Protect others around you: Learn how to safely use, store and throw away your medicines at www.disposemymeds.org.          This list is accurate as of 9/26/18 12:06 PM.  Always use your most recent med list.                   Brand Name Dispense Instructions for use Diagnosis    acetaminophen 650 MG 8 hour tablet     100 tablet    Take 650 mg by mouth every 6 hours    Adrenal mass (H)       aspirin 81 MG tablet     30 tablet    Take 1 tablet (81 mg) by mouth daily        atorvastatin 80 MG tablet     LIPITOR    90 tablet    Take 1 tablet (80 mg) by mouth daily    STEMI (ST elevation myocardial infarction) (H), Hyperlipidemia LDL goal <100       lisinopril 20 MG tablet    PRINIVIL/ZESTRIL    30 tablet    Take 1 tablet (20 mg) by mouth daily    History of MI (myocardial infarction)       metoprolol succinate 25 MG 24 hr tablet    TOPROL-XL    30 tablet    Take 1 tablet (25 mg) by mouth daily (Needs office visit)    History of MI (myocardial infarction)       nitroGLYcerin 0.4 MG sublingual tablet    NITROSTAT    25 tablet    Place 1 tablet (0.4 mg) under the tongue every 5 minutes as needed for chest pain    CAD (coronary artery disease)       timolol maleate 0.5 % opthalmic solution    ISTALOL     Apply 1 drop to eye 2 times daily Left        travoprost Z (benzalkonium) 0.004 % ophthalmic solution    TRAVATAN Z     Place 1 drop into both eyes At Bedtime

## 2018-09-26 NOTE — PROGRESS NOTES
Visit Date:   2018      REASON FOR VISIT TODAY:  History of bladder cancer.      HISTORY:  Ms. Samuel is a 73-year-old woman followed in our clinic for history of upper tract urothelial cancer as well as bladder cancer.  The patient underwent a robotic-assisted laparoscopic nephroureterectomy on the left on 2015.  Final pathology demonstrating high-grade T1 disease with negative margins.  She has had recurrences in the bladder in 2015, 2016, 2016 as well as 2017.  These have all been superficial low-grade recurrences.  She presents today for another surveillance cystoscopy, noting no blood in her urine since we last saw her.      PROCEDURE NOTE:  After informed consent was obtained, the patient was prepped and draped in standard sterile fashion.  A flexible cystoscope was introduced into the patient's bladder without difficulty.  Inspection of bladder revealed the left UO to be surgically absent.  The right UO was effluxing clear urine.  There were some inflammatory nodules consistent with cystitis scattered throughout the bladder.  There were no papillary lesions, stones or foreign objects noted in the bladder.      I suggested to Ms. Samuel that we are pleased to see that there was no obvious urothelial tumors in her bladder today.  The patient did leave us a urine for cytology and will call back next week for the result.  Otherwise, we will see the patient back in another 6 months for her next surveillance cystoscopy.         KATTY FERMIN MD             D: 2018   T: 2018   MT: STEPHANE      Name:     SELMA SAMUEL   MRN:      [id number]        Account:      [id number]   :      1945           Visit Date:   2018      Document: [id number]

## 2018-09-26 NOTE — PROGRESS NOTES

## 2018-09-29 LAB — COPATH REPORT: NORMAL

## 2018-10-04 ENCOUNTER — TELEPHONE (OUTPATIENT)
Dept: UROLOGY | Facility: CLINIC | Age: 73
End: 2018-10-04

## 2018-10-04 NOTE — TELEPHONE ENCOUNTER
I left a message on pts cell phone, urine cytology was negative for cancer cells.  rey larios LPN

## 2018-10-29 ENCOUNTER — TELEPHONE (OUTPATIENT)
Dept: CARDIOLOGY | Facility: CLINIC | Age: 73
End: 2018-10-29

## 2018-10-29 DIAGNOSIS — I25.10 CORONARY ARTERY DISEASE WITHOUT ANGINA PECTORIS, UNSPECIFIED VESSEL OR LESION TYPE, UNSPECIFIED WHETHER NATIVE OR TRANSPLANTED HEART: Primary | ICD-10-CM

## 2018-10-29 NOTE — TELEPHONE ENCOUNTER
----- Message from Margareth Shelton RN sent at 10/29/2018  2:07 PM CDT -----  Regarding: Fasting Labs  Please order Lipid/ALT/BMP  Dx: CAD  Under Dr. Ryder name please  Will see MD Cindy in revisit 11/19/18.  Please call patient to have done just prior to this revisit. Result will be discussed at that visit.  Thanks,  Elana  ---  Left message to schedule labs.  3:16 PM 10/29/18 SARAH Johnson [facility]

## 2018-11-13 ENCOUNTER — TRANSFERRED RECORDS (OUTPATIENT)
Dept: HEALTH INFORMATION MANAGEMENT | Facility: CLINIC | Age: 73
End: 2018-11-13

## 2018-11-16 DIAGNOSIS — I25.10 CORONARY ARTERY DISEASE WITHOUT ANGINA PECTORIS, UNSPECIFIED VESSEL OR LESION TYPE, UNSPECIFIED WHETHER NATIVE OR TRANSPLANTED HEART: ICD-10-CM

## 2018-11-16 LAB
ALT SERPL W P-5'-P-CCNC: 28 U/L (ref 0–50)
ANION GAP SERPL CALCULATED.3IONS-SCNC: 6 MMOL/L (ref 3–14)
BUN SERPL-MCNC: 23 MG/DL (ref 7–30)
CALCIUM SERPL-MCNC: 8.5 MG/DL (ref 8.5–10.1)
CHLORIDE SERPL-SCNC: 109 MMOL/L (ref 94–109)
CHOLEST SERPL-MCNC: 253 MG/DL
CO2 SERPL-SCNC: 26 MMOL/L (ref 20–32)
CREAT SERPL-MCNC: 1.14 MG/DL (ref 0.52–1.04)
GFR SERPL CREATININE-BSD FRML MDRD: 46 ML/MIN/1.7M2
GLUCOSE SERPL-MCNC: 79 MG/DL (ref 70–99)
HDLC SERPL-MCNC: 67 MG/DL
LDLC SERPL CALC-MCNC: 151 MG/DL
NONHDLC SERPL-MCNC: 186 MG/DL
POTASSIUM SERPL-SCNC: 4.9 MMOL/L (ref 3.4–5.3)
SODIUM SERPL-SCNC: 141 MMOL/L (ref 133–144)
TRIGL SERPL-MCNC: 174 MG/DL

## 2018-11-16 PROCEDURE — 36415 COLL VENOUS BLD VENIPUNCTURE: CPT | Performed by: INTERNAL MEDICINE

## 2018-11-16 PROCEDURE — 80048 BASIC METABOLIC PNL TOTAL CA: CPT | Performed by: INTERNAL MEDICINE

## 2018-11-16 PROCEDURE — 84460 ALANINE AMINO (ALT) (SGPT): CPT | Performed by: INTERNAL MEDICINE

## 2018-11-16 PROCEDURE — 80061 LIPID PANEL: CPT | Performed by: INTERNAL MEDICINE

## 2018-11-19 ENCOUNTER — OFFICE VISIT (OUTPATIENT)
Dept: CARDIOLOGY | Facility: CLINIC | Age: 73
End: 2018-11-19
Payer: MEDICARE

## 2018-11-19 VITALS
BODY MASS INDEX: 33.65 KG/M2 | DIASTOLIC BLOOD PRESSURE: 93 MMHG | OXYGEN SATURATION: 97 % | HEART RATE: 89 BPM | SYSTOLIC BLOOD PRESSURE: 186 MMHG | WEIGHT: 184 LBS

## 2018-11-19 DIAGNOSIS — E78.5 HYPERLIPIDEMIA LDL GOAL <100: ICD-10-CM

## 2018-11-19 DIAGNOSIS — I25.2 HISTORY OF MI (MYOCARDIAL INFARCTION): ICD-10-CM

## 2018-11-19 PROCEDURE — 99214 OFFICE O/P EST MOD 30 MIN: CPT | Performed by: INTERNAL MEDICINE

## 2018-11-19 RX ORDER — LISINOPRIL 20 MG/1
20 TABLET ORAL DAILY
Qty: 90 TABLET | Refills: 3 | Status: SHIPPED | OUTPATIENT
Start: 2018-11-19 | End: 2020-06-30

## 2018-11-19 RX ORDER — ATORVASTATIN CALCIUM 80 MG/1
80 TABLET, FILM COATED ORAL DAILY
Qty: 90 TABLET | Refills: 3 | Status: SHIPPED | OUTPATIENT
Start: 2018-11-19 | End: 2021-04-27

## 2018-11-19 RX ORDER — METOPROLOL SUCCINATE 25 MG/1
25 TABLET, EXTENDED RELEASE ORAL DAILY
Qty: 90 TABLET | Refills: 3 | Status: SHIPPED | OUTPATIENT
Start: 2018-11-19 | End: 2020-06-30

## 2018-11-19 NOTE — PATIENT INSTRUCTIONS
"[facility] HE[facility]  [facility]~[address]~[place], [place]~[address]  Thank you for your [facility] visit today. If you have questions regarding your visit, please contact your cardiology RN's, Elana Shelton or Anastasiya Forrester, at [phone]. Your provider has recommended the following:  Medication Changes:    Recommendations:  Fasting labs in 1 month  Follow-up:  See MAGALY for cardiology follow up in 1 month    To schedule a future appointment, we kindly ask that you call cardiology scheduling at [phone] three months prior to requested revisit date.  [facility] cardiology clinic is staffed with \"Advance Practice Providers\". These are our cardiology Physician Assistants and Nurse Practitioners. Please call cardiology scheduling if you feel you need clinical evaluation with them at any time for any cardiac reason.                 Reminder:  For your safety, we ask that you bring in your current medication(s) or an updated list of your medications with you to EACH office visit. Include the medication name, dose of pill on bottle and how you are taking it. Include over-the-counter medications or supplements. Your provider will review this at each visit and plan your care based on your current information.       "

## 2018-11-19 NOTE — PROGRESS NOTES
HPI and Plan:   See dictation    Orders Placed This Encounter   Procedures     Lipid panel reflex to direct LDL Fasting     Basic metabolic panel     ALT     Follow-Up with Cardiac Advanced Practice Provider     Echocardiogram       Orders Placed This Encounter   Medications     atorvastatin (LIPITOR) 80 MG tablet     Sig: Take 1 tablet (80 mg) by mouth daily     Dispense:  90 tablet     Refill:  3     lisinopril (PRINIVIL/ZESTRIL) 20 MG tablet     Sig: Take 1 tablet (20 mg) by mouth daily     Dispense:  90 tablet     Refill:  3     metoprolol succinate (TOPROL-XL) 25 MG 24 hr tablet     Sig: Take 1 tablet (25 mg) by mouth daily (Needs office visit)     Dispense:  90 tablet     Refill:  3       Medications Discontinued During This Encounter   Medication Reason     atorvastatin (LIPITOR) 80 MG tablet Reorder     lisinopril (PRINIVIL/ZESTRIL) 20 MG tablet Reorder     metoprolol succinate (TOPROL-XL) 25 MG 24 hr tablet Reorder         Encounter Diagnoses   Name Primary?     Hyperlipidemia LDL goal <100      History of MI (myocardial infarction)        CURRENT MEDICATIONS:  Current Outpatient Prescriptions   Medication Sig Dispense Refill     acetaminophen 650 MG TABS Take 650 mg by mouth every 6 hours 100 tablet 0     aspirin 81 MG tablet Take 1 tablet (81 mg) by mouth daily 30 tablet      atorvastatin (LIPITOR) 80 MG tablet Take 1 tablet (80 mg) by mouth daily 90 tablet 3     lisinopril (PRINIVIL/ZESTRIL) 20 MG tablet Take 1 tablet (20 mg) by mouth daily 90 tablet 3     metoprolol succinate (TOPROL-XL) 25 MG 24 hr tablet Take 1 tablet (25 mg) by mouth daily (Needs office visit) 90 tablet 3     nitroglycerin (NITROSTAT) 0.4 MG SL tablet Place 1 tablet (0.4 mg) under the tongue every 5 minutes as needed for chest pain 25 tablet 3     timolol maleate (ISTALOL) 0.5 % opthalmic solution Apply 1 drop to eye 2 times daily Left       travoprost Z, benzalkonium, (TRAVATAN Z) 0.004 % ophthalmic solution Place 1 drop into both  eyes At Bedtime        [DISCONTINUED] atorvastatin (LIPITOR) 80 MG tablet Take 1 tablet (80 mg) by mouth daily 90 tablet 3     [DISCONTINUED] lisinopril (PRINIVIL/ZESTRIL) 20 MG tablet Take 1 tablet (20 mg) by mouth daily 30 tablet 0     [DISCONTINUED] metoprolol succinate (TOPROL-XL) 25 MG 24 hr tablet Take 1 tablet (25 mg) by mouth daily (Needs office visit) 30 tablet 0       ALLERGIES     Allergies   Allergen Reactions     Latex Rash     Bees      Macrobid [Nitrofurantoin] Nausea       PAST MEDICAL HISTORY:  Past Medical History:   Diagnosis Date     CAD (coronary artery disease)     2003- MI with PCI to RCA, 6/2014 inferior STEMI- instent thrombosis RCA s/p PCI     Heart attack      HTN (hypertension)      Hyperlipidemia LDL goal <70      Macular degeneration      PONV (postoperative nausea and vomiting)      Tobacco use        PAST SURGICAL HISTORY:  Past Surgical History:   Procedure Laterality Date     CARDIAC SURGERY       CYSTOSCOPY, BIOPSY BLADDER, COMBINED Right 4/14/2016    Procedure: COMBINED CYSTOSCOPY, BIOPSY BLADDER;  Surgeon: Forest Jim MD;  Location: [facility]     CYSTOSCOPY, REMOVE STENT(S), COMBINED Left 4/14/2015    Procedure: COMBINED CYSTOSCOPY, REMOVE STENT(S);  Surgeon: Forest Jim MD;  Location: [facility]     CYSTOSCOPY, RETROGRADES, COMBINED Left 10/21/2015    Procedure: COMBINED CYSTOSCOPY, RETROGRADES;  Surgeon: Forest Jim MD;  Location: [facility]     CYSTOSCOPY, RETROGRADES, COMBINED Right 5/4/2017    Procedure: COMBINED CYSTOSCOPY, RETROGRADES;  Cystoscopy,Right Retrograde Pyelogram;  Surgeon: Forest Jim MD;  Location: [facility]     CYSTOSCOPY, RETROGRADES, INSERT STENT URETER(S), COMBINED Left 3/12/2015    Procedure: COMBINED CYSTOSCOPY, RETROGRADES, INSERT STENT URETER(S);  Surgeon: Forest Jim MD;  Location: [facility]     DAVINCI NEPHROURETERECTOMY Left 4/14/2015    Procedure: DAVINCI NEPHROURETERECTOMY;  Surgeon:  Forest Jim MD;  Location: [facility]     GYN SURGERY       HYSTERECTOMY      age 40, fibroids. Still has right ovary       FAMILY HISTORY:  Family History   Problem Relation Age of Onset     Breast Cancer Mother      Cerebrovascular Disease Father      HEART DISEASE Brother      pacemaker       SOCIAL HISTORY:  Social History     Social History     Marital status: Single     Spouse name: N/A     Number of children: N/A     Years of education: N/A     Social History Main Topics     Smoking status: Current Every Day Smoker     Packs/day: 0.10     Types: Cigarettes     Smokeless tobacco: Never Used      Comment: Trying hard to quit 9/24/15.  smokes about 2-3 cig per day     Alcohol use Yes      Comment: few drinks per month     Drug use: No     Sexual activity: No     Other Topics Concern     Parent/Sibling W/ Cabg, Mi Or Angioplasty Before 65f 55m? No     Social History Narrative       Review of Systems:  Skin:  Negative       Eyes:  Positive for glasses    ENT:  Negative      Respiratory:  Positive for cough     Cardiovascular:  chest pain;Negative for;palpitations;edema;syncope or near-syncope;lightheadedness fatigue;Positive for;dizziness    Gastroenterology: Negative for nausea;vomiting;heartburn    Genitourinary:  Positive for   CKD- sees Dr. Jim  Musculoskeletal:  not assessed      Neurologic:  Negative for memory problems;headaches;stroke;seizures;numbness or tingling of hands;numbness or tingling of feet    Psychiatric:  Negative for anxiety;depression    Heme/Lymph/Imm:  Negative for bleeding disorder    Endocrine:  Negative for thyroid disorder;diabetes      Physical Exam:  Vitals: BP (!) 186/93 (Cuff Size: Adult Large)  Pulse 89  Wt 83.5 kg (184 lb)  SpO2 97%  BMI 33.65 kg/m2    Constitutional:  cooperative        Skin:  warm and dry to the touch          Head:  normocephalic        Eyes:  pupils equal and round        Lymph:No Cervical lymphadenopathy present     ENT:           Neck:   JVP normal        Respiratory:  clear to auscultation         Cardiac: regular rhythm                pulses full and equal                                        GI:  abdomen soft        Extremities and Muscular Skeletal:  no edema              Neurological:           Psych:  Alert and Oriented x 3        CC  No referring provider defined for this encounter.

## 2018-11-19 NOTE — MR AVS SNAPSHOT
"              After Visit Summary   11/19/2018    Allyson Samuel    MRN: [id number]           Patient Information     Date Of Birth          1945        Visit Information        Provider Department      11/19/2018 10:00 AM Lori White MD [facility]        Today's Diagnoses     Hyperlipidemia LDL goal <100        History of MI (myocardial infarction)          Care Instructions    [facility]~[address]~[place], [place]~[address]  Thank you for your  Heart Care visit today. If you have questions regarding your visit, please contact your cardiology RN's, Elana Shelton or Anastasiya Forrester, at [phone]. Your provider has recommended the following:  Medication Changes:    Recommendations:  Fasting labs in 1 month  Follow-up:  See MAGALY for cardiology follow up in 1 month    To schedule a future appointment, we kindly ask that you call cardiology scheduling at [phone] three months prior to requested revisit date.  [facility] cardiology clinic is staffed with \"Advance Practice Providers\". These are our cardiology Physician Assistants and Nurse Practitioners. Please call cardiology scheduling if you feel you need clinical evaluation with them at any time for any cardiac reason.                 Reminder:  For your safety, we ask that you bring in your current medication(s) or an updated list of your medications with you to EACH office visit. Include the medication name, dose of pill on bottle and how you are taking it. Include over-the-counter medications or supplements. Your provider will review this at each visit and plan your care based on your current information.               Follow-ups after your visit        Additional Services     Follow-Up with Cardiac Advanced Practice Provider                 Your next 10 appointments already scheduled     Dec 21, 2018 10:30 AM CST   LAB with [facility] [facility]   [place] " [facility] ([facility])    [address] [address]   [phone]           Please do not eat 10-12 hours before your appointment if you are coming in fasting for labs on lipids, cholesterol, or glucose (sugar). This does not apply to pregnant women. Water, hot tea and black coffee (with nothing added) are okay. Do not drink other fluids, diet soda or chew gum.            Dec 21, 2018 10:45 AM CST   Ech Complete with LAZARO   [facility] ([facility])    [address] [address]   [phone]           1.  Please bring or wear a comfortable two-piece outfit. 2.  You may eat, drink and take your normal medicines. 3.  For any questions that cannot be answered, please contact the ordering physician 4.  Please do not wear perfumes or scented lotions on the day of your exam.            Dec 26, 2018  1:30 PM CST   Return Visit with JULIETA Welch CNP   [facility] ([facility])    [address] [address]   [phone]            Mar 27, 2019 10:00 AM CDT   CYSTO with Forest Jim MD   [facility] ([facility])    [address] [facility] [address] [address]   [phone]              Future tests that were ordered for you today     Open Future Orders        Priority Expected Expires Ordered    Echocardiogram Routine 11/26/2018 11/19/2019 11/19/2018    Lipid panel reflex to direct LDL Fasting Routine 12/19/2018 11/19/2019 11/19/2018    Basic metabolic panel Routine 12/19/2018 11/19/2019 11/19/2018    ALT Routine 12/19/2018 11/19/2019 11/19/2018    Follow-Up with Cardiac Advanced Practice Provider Routine 12/19/2018 11/18/2020 11/19/2018            Who to contact     If you have questions or need follow up information about today's clinic visit or your schedule please contact [facility]  [facility]   [place] directly at [phone].  Normal or non-critical lab and imaging results will be communicated to you by MyChart, letter or phone within 4 business days after the clinic has received the results. If you do not hear from us within 7 days, please contact the clinic through [employer] or phone. If you have a critical or abnormal lab result, we will notify you by phone as soon as possible.  Submit refill requests through [employer] or call your pharmacy and they will forward the refill request to us. Please allow 3 business days for your refill to be completed.          Additional Information About Your Visit        [employer] Information     [employer] gives you secure access to your electronic health record. If you see a primary care provider, you can also send messages to your care team and make appointments. If you have questions, please call your primary care clinic.  If you do not have a primary care provider, please call [phone] and they will assist you.        Care EveryWhere ID     This is your Care EveryWhere ID. This could be used by other organizations to access your [place] medical records  [id number]        Your Vitals Were     Pulse Pulse Oximetry BMI (Body Mass Index)             89 97% 33.65 kg/m2          Blood Pressure from Last 3 Encounters:   11/19/18 (!) 186/93   09/26/18 170/88   03/14/18 (!) 146/93    Weight from Last 3 Encounters:   11/19/18 83.5 kg (184 lb)   08/16/17 75.3 kg (166 lb)   05/04/17 75.3 kg (166 lb)                 Where to get your medicines      These [place] [facility] [place] [address] [address]  [address] [address]     Phone:  [phone]     atorvastatin 80 MG tablet    lisinopril 20 MG tablet    metoprolol succinate 25 MG 24 hr tablet          Primary Care Provider Office Phone # Fax #    JULIETA Molina [facility] [phone] [address] [address]        Equal  Access to Services     [facility] CenterAMBROSE : Hadii aad ku hadazaliadandre José Miguelali, wabushrada luqadaha, qaybta kaalmaherrera neil. So [facility] [phone].    ATENCIÓN: Si habla facundo, tiene a bess disposición servicios gratuitos de asistencia lingüística. Llame al [phone].    We comply with applicable federal civil rights laws and [place] laws. We do not discriminate on the basis of race, color, national origin, age, disability, sex, sexual orientation, or gender identity.            Thank you!     Thank you for choosing [facility]  for your care. Our goal is always to provide you with excellent care. Hearing back from our patients is one way we can continue to improve our services. Please take a few minutes to complete the written survey that you may receive in the mail after your visit with us. Thank you!             Your Updated Medication List - Protect others around you: Learn how to safely use, store and throw away your medicines at www.disposemymeds.org.          This list is accurate as of 11/19/18 10:56 AM.  Always use your most recent med list.                   Brand Name Dispense Instructions for use Diagnosis    acetaminophen 650 MG 8 hour tablet     100 tablet    Take 650 mg by mouth every 6 hours    Adrenal mass (H)       aspirin 81 MG tablet     30 tablet    Take 1 tablet (81 mg) by mouth daily        atorvastatin 80 MG tablet    LIPITOR    90 tablet    Take 1 tablet (80 mg) by mouth daily    Hyperlipidemia LDL goal <100       lisinopril 20 MG tablet    PRINIVIL/ZESTRIL    90 tablet    Take 1 tablet (20 mg) by mouth daily    History of MI (myocardial infarction)       metoprolol succinate 25 MG 24 hr tablet    TOPROL-XL    90 tablet    Take 1 tablet (25 mg) by mouth daily (Needs office visit)    History of MI (myocardial infarction)       nitroGLYcerin 0.4 MG sublingual tablet    NITROSTAT    25 tablet    Place 1 tablet (0.4 mg)  under the tongue every 5 minutes as needed for chest pain    CAD (coronary artery disease)       timolol maleate 0.5 % opthalmic solution    ISTALOL     Apply 1 drop to eye 2 times daily Left        travoprost Z (benzalkonium) 0.004 % ophthalmic solution    TRAVATAN Z     Place 1 drop into both eyes At Bedtime

## 2018-11-19 NOTE — LETTER
11/19/2018    Rita Massey, APRN CNP  [address] [address]    RE: Allyson ADAMS Lizzie       Dear Colleague,    I had the pleasure of seeing Allyson BRYAN Samuel in the [facility].    HPI and Plan:   See dictation    Orders Placed This Encounter   Procedures     Lipid panel reflex to direct LDL Fasting     Basic metabolic panel     ALT     Follow-Up with Cardiac Advanced Practice Provider     Echocardiogram       Orders Placed This Encounter   Medications     atorvastatin (LIPITOR) 80 MG tablet     Sig: Take 1 tablet (80 mg) by mouth daily     Dispense:  90 tablet     Refill:  3     lisinopril (PRINIVIL/ZESTRIL) 20 MG tablet     Sig: Take 1 tablet (20 mg) by mouth daily     Dispense:  90 tablet     Refill:  3     metoprolol succinate (TOPROL-XL) 25 MG 24 hr tablet     Sig: Take 1 tablet (25 mg) by mouth daily (Needs office visit)     Dispense:  90 tablet     Refill:  3       Medications Discontinued During This Encounter   Medication Reason     atorvastatin (LIPITOR) 80 MG tablet Reorder     lisinopril (PRINIVIL/ZESTRIL) 20 MG tablet Reorder     metoprolol succinate (TOPROL-XL) 25 MG 24 hr tablet Reorder         Encounter Diagnoses   Name Primary?     Hyperlipidemia LDL goal <100      History of MI (myocardial infarction)        CURRENT MEDICATIONS:  Current Outpatient Prescriptions   Medication Sig Dispense Refill     acetaminophen 650 MG TABS Take 650 mg by mouth every 6 hours 100 tablet 0     aspirin 81 MG tablet Take 1 tablet (81 mg) by mouth daily 30 tablet      atorvastatin (LIPITOR) 80 MG tablet Take 1 tablet (80 mg) by mouth daily 90 tablet 3     lisinopril (PRINIVIL/ZESTRIL) 20 MG tablet Take 1 tablet (20 mg) by mouth daily 90 tablet 3     metoprolol succinate (TOPROL-XL) 25 MG 24 hr tablet Take 1 tablet (25 mg) by mouth daily (Needs office visit) 90 tablet 3     nitroglycerin (NITROSTAT) 0.4 MG SL tablet Place 1 tablet (0.4 mg) under the tongue every 5 minutes as needed for  chest pain 25 tablet 3     timolol maleate (ISTALOL) 0.5 % opthalmic solution Apply 1 drop to eye 2 times daily Left       travoprost Z, benzalkonium, (TRAVATAN Z) 0.004 % ophthalmic solution Place 1 drop into both eyes At Bedtime        [DISCONTINUED] atorvastatin (LIPITOR) 80 MG tablet Take 1 tablet (80 mg) by mouth daily 90 tablet 3     [DISCONTINUED] lisinopril (PRINIVIL/ZESTRIL) 20 MG tablet Take 1 tablet (20 mg) by mouth daily 30 tablet 0     [DISCONTINUED] metoprolol succinate (TOPROL-XL) 25 MG 24 hr tablet Take 1 tablet (25 mg) by mouth daily (Needs office visit) 30 tablet 0       ALLERGIES     Allergies   Allergen Reactions     Latex Rash     Bees      Macrobid [Nitrofurantoin] Nausea       PAST MEDICAL HISTORY:  Past Medical History:   Diagnosis Date     CAD (coronary artery disease)     2003- MI with PCI to RCA, 6/2014 inferior STEMI- instent thrombosis RCA s/p PCI     Heart attack      HTN (hypertension)      Hyperlipidemia LDL goal <70      Macular degeneration      PONV (postoperative nausea and vomiting)      Tobacco use        PAST SURGICAL HISTORY:  Past Surgical History:   Procedure Laterality Date     CARDIAC SURGERY       CYSTOSCOPY, BIOPSY BLADDER, COMBINED Right 4/14/2016    Procedure: COMBINED CYSTOSCOPY, BIOPSY BLADDER;  Surgeon: Forest Jim MD;  Location: [facility]     CYSTOSCOPY, REMOVE STENT(S), COMBINED Left 4/14/2015    Procedure: COMBINED CYSTOSCOPY, REMOVE STENT(S);  Surgeon: Forest Jim MD;  Location: [facility]     CYSTOSCOPY, RETROGRADES, COMBINED Left 10/21/2015    Procedure: COMBINED CYSTOSCOPY, RETROGRADES;  Surgeon: Forest Jim MD;  Location: [facility]     CYSTOSCOPY, RETROGRADES, COMBINED Right 5/4/2017    Procedure: COMBINED CYSTOSCOPY, RETROGRADES;  Cystoscopy,Right Retrograde Pyelogram;  Surgeon: Forest Jim MD;  Location: [facility]     CYSTOSCOPY, RETROGRADES, INSERT STENT URETER(S), COMBINED Left 3/12/2015    Procedure:  COMBINED CYSTOSCOPY, RETROGRADES, INSERT STENT URETER(S);  Surgeon: Forest Jim MD;  Location: [facility]     DAVINCI NEPHROURETERECTOMY Left 4/14/2015    Procedure: DAVINCI NEPHROURETERECTOMY;  Surgeon: Forest Jim MD;  Location: [facility] OR     GYN SURGERY       HYSTERECTOMY      age 40, fibroids. Still has right ovary       FAMILY HISTORY:  Family History   Problem Relation Age of Onset     Breast Cancer Mother      Cerebrovascular Disease Father      HEART DISEASE Brother      pacemaker       SOCIAL HISTORY:  Social History     Social History     Marital status: Single     Spouse name: N/A     Number of children: N/A     Years of education: N/A     Social History Main Topics     Smoking status: Current Every Day Smoker     Packs/day: 0.10     Types: Cigarettes     Smokeless tobacco: Never Used      Comment: Trying hard to quit 9/24/15.  smokes about 2-3 cig per day     Alcohol use Yes      Comment: few drinks per month     Drug use: No     Sexual activity: No     Other Topics Concern     Parent/Sibling W/ Cabg, Mi Or Angioplasty Before 65f 55m? No     Social History Narrative       Review of Systems:  Skin:  Negative       Eyes:  Positive for glasses    ENT:  Negative      Respiratory:  Positive for cough     Cardiovascular:  chest pain;Negative for;palpitations;edema;syncope or near-syncope;lightheadedness fatigue;Positive for;dizziness    Gastroenterology: Negative for nausea;vomiting;heartburn    Genitourinary:  Positive for   CKD- sees Dr. Jim  Musculoskeletal:  not assessed      Neurologic:  Negative for memory problems;headaches;stroke;seizures;numbness or tingling of hands;numbness or tingling of feet    Psychiatric:  Negative for anxiety;depression    Heme/Lymph/Imm:  Negative for bleeding disorder    Endocrine:  Negative for thyroid disorder;diabetes      Physical Exam:  Vitals: BP (!) 186/93 (Cuff Size: Adult Large)  Pulse 89  Wt 83.5 kg (184 lb)  SpO2 97%  BMI 33.65  kg/m2    Constitutional:  cooperative        Skin:  warm and dry to the touch          Head:  normocephalic        Eyes:  pupils equal and round        Lymph:No Cervical lymphadenopathy present     ENT:           Neck:  JVP normal        Respiratory:  clear to auscultation         Cardiac: regular rhythm                pulses full and equal                                        GI:  abdomen soft        Extremities and Muscular Skeletal:  no edema              Neurological:           Psych:  Alert and Oriented x 3        CC  No referring provider defined for this encounter.                Thank you for allowing me to participate in the care of your patient.      Sincerely,     Lori White MD     [facility]    cc:   No referring provider defined for this encounter.

## 2018-11-19 NOTE — PROGRESS NOTES
Service Date: 11/19/2018      HISTORY OF PRESENT ILLNESS:  I had the pleasure of seeing Ms. Samuel in followup at the [facility] today.  She is a very pleasant 73-year-old female who has been seen by Dr. Ryder in our clinic most recently in 2017.  She has also previously seen Dr. Avalos at the [facility] as well as Dr. Mela Groves back in 2014.      Ms. Samuel has a history of an inferior myocardial infarction in 2003 at which point a bare metal stent was placed to the right coronary artery.  In 06/2014 she had an inferior ST elevation myocardial infarction and underwent drug-eluting stent placement to the right coronary artery at that time.  She had minimal nonobstructive disease in her left system.  Her last cardiac evaluation was a cardiac MRI in 06/2014.  This demonstrated normal left and right ventricular systolic function with transmural enhancement of the entire inferior wall consistent with a previous infarction with limited viability.      She has done well since then from a cardiovascular standpoint, although it appears that she ran out of her medications approximately 3 months ago and was unable to make an appointment so she could get these drugs refilled.  Fortunately, from a cardiac standpoint, she denies any chest discomfort, dyspnea on exertion, PND or orthopnea.  She denies any syncope or presyncope.  She unfortunately continues to smoke, but says that she has cut down to 4 cigarettes a day.  She does plan to stop at some point in the future.      PHYSICAL EXAMINATION:  As dictated below.      LABORATORY STUDIES:  Lipids on 11/16/2016 demonstrated total cholesterol of 253, HDL 67, LDL of 151, triglycerides of 174.  This is obviously not surprising given that she has not been on lipid-lowering therapy for approximately 3 months.        Her basic metabolic panel the same day demonstrated a total sodium of 141, potassium of 4.9 and creatinine of  1.14.      IMPRESSION:   1.  Coronary artery disease status post inferior ST elevation myocardial infarction in 2014 as described above.   2.  Hypertension.   3.  Dyslipidemia.   4.  Tobacco abuse.      Ms. Samuel is doing well overall from a cardiovascular standpoint.  There is no evidence of angina or congestive heart failure.  She is markedly hypertensive today and her lipid control has worsened, both of which are not surprising given that she has been off her medications for 3 months.  At this point, I have refilled all her cardiac medications.  I would like to come back in 1 month for an MAGALY visit at which point we will reassess her blood pressure control and recheck a lipid panel.  I will also obtain an echocardiogram to reassess her left ventricular systolic function.      We also discussed tobacco cessation.  I congratulated the patient on the decision to reduce her tobacco consumption and hopefully she can discontinue this completely in the next few months.         ANIKA MAR MD             D: 2018   T: 2018   MT: BRIA      Name:     SELMA SAMUEL   MRN:      [id number]        Account:      [id number]   :      1945           Service Date: 2018      Document: [id number]

## 2018-11-19 NOTE — LETTER
11/19/2018      Rita Sue Shilpa, APRN CNP  [address] [address]      RE: Allyson Samuel       Dear Colleague,    I had the pleasure of seeing Allyson Samuel in the [facility].    Service Date: 11/19/2018      HISTORY OF PRESENT ILLNESS:  I had the pleasure of seeing Ms. Samuel in followup at the [facility] today.  She is a very pleasant 73-year-old female who has been seen by Dr. Ryder in our clinic most recently in 2017.  She has also previously seen Dr. Avalos at the [facility] as well as Dr. Mela Groves back in 2014.      Ms. Samuel has a history of an inferior myocardial infarction in 2003 at which point a bare metal stent was placed to the right coronary artery.  In 06/2014 she had an inferior ST elevation myocardial infarction and underwent drug-eluting stent placement to the right coronary artery at that time.  She had minimal nonobstructive disease in her left system.  Her last cardiac evaluation was a cardiac MRI in 06/2014.  This demonstrated normal left and right ventricular systolic function with transmural enhancement of the entire inferior wall consistent with a previous infarction with limited viability.      She has done well since then from a cardiovascular standpoint, although it appears that she ran out of her medications approximately 3 months ago and was unable to make an appointment so she could get these drugs refilled.  Fortunately, from a cardiac standpoint, she denies any chest discomfort, dyspnea on exertion, PND or orthopnea.  She denies any syncope or presyncope.  She unfortunately continues to smoke, but says that she has cut down to 4 cigarettes a day.  She does plan to stop at some point in the future.      PHYSICAL EXAMINATION:  As dictated below.      LABORATORY STUDIES:  Lipids on 11/16/2016 demonstrated total cholesterol of 253, HDL 67, LDL of 151, triglycerides of 174.  This is  obviously not surprising given that she has not been on lipid-lowering therapy for approximately 3 months.        Her basic metabolic panel the same day demonstrated a total sodium of 141, potassium of 4.9 and creatinine of 1.14.      IMPRESSION:   1.  Coronary artery disease status post inferior ST elevation myocardial infarction in 2014 as described above.   2.  Hypertension.   3.  Dyslipidemia.   4.  Tobacco abuse.      Ms. Samuel is doing well overall from a cardiovascular standpoint.  There is no evidence of angina or congestive heart failure.  She is markedly hypertensive today and her lipid control has worsened, both of which are not surprising given that she has been off her medications for 3 months.  At this point, I have refilled all her cardiac medications.  I would like to come back in 1 month for an MAGALY visit at which point we will reassess her blood pressure control and recheck a lipid panel.  I will also obtain an echocardiogram to reassess her left ventricular systolic function.      We also discussed tobacco cessation.  I congratulated the patient on the decision to reduce her tobacco consumption and hopefully she can discontinue this completely in the next few months.         ANIKA MAR MD             D: 2018   T: 2018   MT: BRIA      Name:     SELMA SAMUEL   MRN:      [id number]        Account:      [id number]   :      1945           Service Date: 2018      Document: [id number]         Outpatient Encounter Prescriptions as of 2018   Medication Sig Dispense Refill     acetaminophen 650 MG TABS Take 650 mg by mouth every 6 hours 100 tablet 0     aspirin 81 MG tablet Take 1 tablet (81 mg) by mouth daily 30 tablet      atorvastatin (LIPITOR) 80 MG tablet Take 1 tablet (80 mg) by mouth daily 90 tablet 3     lisinopril (PRINIVIL/ZESTRIL) 20 MG tablet Take 1 tablet (20 mg) by mouth daily 90 tablet 3     metoprolol succinate (TOPROL-XL) 25 MG 24 hr tablet Take 1  tablet (25 mg) by mouth daily (Needs office visit) 90 tablet 3     nitroglycerin (NITROSTAT) 0.4 MG SL tablet Place 1 tablet (0.4 mg) under the tongue every 5 minutes as needed for chest pain 25 tablet 3     timolol maleate (ISTALOL) 0.5 % opthalmic solution Apply 1 drop to eye 2 times daily Left       travoprost Z, benzalkonium, (TRAVATAN Z) 0.004 % ophthalmic solution Place 1 drop into both eyes At Bedtime        [DISCONTINUED] atorvastatin (LIPITOR) 80 MG tablet Take 1 tablet (80 mg) by mouth daily 90 tablet 3     [DISCONTINUED] lisinopril (PRINIVIL/ZESTRIL) 20 MG tablet Take 1 tablet (20 mg) by mouth daily 30 tablet 0     [DISCONTINUED] metoprolol succinate (TOPROL-XL) 25 MG 24 hr tablet Take 1 tablet (25 mg) by mouth daily (Needs office visit) 30 tablet 0     No facility-administered encounter medications on file as of 11/19/2018.        Again, thank you for allowing me to participate in the care of your patient.      Sincerely,    Lori White MD     [facility]

## 2019-01-16 ENCOUNTER — TRANSFERRED RECORDS (OUTPATIENT)
Dept: HEALTH INFORMATION MANAGEMENT | Facility: CLINIC | Age: 74
End: 2019-01-16

## 2019-04-25 ENCOUNTER — OFFICE VISIT (OUTPATIENT)
Dept: UROLOGY | Facility: CLINIC | Age: 74
End: 2019-04-25
Payer: MEDICARE

## 2019-04-25 VITALS — SYSTOLIC BLOOD PRESSURE: 171 MMHG | DIASTOLIC BLOOD PRESSURE: 83 MMHG | RESPIRATION RATE: 18 BRPM | HEART RATE: 68 BPM

## 2019-04-25 DIAGNOSIS — C68.9 UROTHELIAL CANCER (H): Primary | ICD-10-CM

## 2019-04-25 PROCEDURE — 52000 CYSTOURETHROSCOPY: CPT | Performed by: UROLOGY

## 2019-04-25 PROCEDURE — 88112 CYTOPATH CELL ENHANCE TECH: CPT | Performed by: UROLOGY

## 2019-04-25 NOTE — PROGRESS NOTES
Visit Date:   2019      REASON FOR VISIT TODAY:  History of bladder cancer.      Ms. Samuel is a 74-year-old woman followed in our clinic for history of upper tract urothelial carcinoma as well as bladder cancer.  The patient underwent a robotic-assisted laparoscopic nephroureterectomy on the left on 2015.  Final pathology demonstrated high-grade T1 disease with negative margins.  She subsequently had recurrences in her bladder in 2015, 2016, 2016 and 2017 that were all low-grade superficial recurrences.  She has had no recurrences since that time.  The patient presents today for surveillance cystoscopy.      After informed consent was obtained, the patient was prepped and draped in standard sterile fashion.  A flexible cystoscope was introduced into the patient's bladder without difficulty.  The left UO was surgically absent.  The right UO was orthotopic.  There was evidence of chronic cystitis throughout the bladder, but no roderick papillary lesions were noted.      ASSESSMENT AND PLAN:  I suggested to Ms. Samuel we are pleased to see there is no evidence of recurrence within the bladder at this time.  She did leave us a urine, which we will send for cytology.  The patient will also get a CT urogram at her earliest convenience for monitoring the other kidney.  The patient will call us on the first Thursday after she gets her CT scan done to get the result.         KATTY FERMIN MD             D: 2019   T: 2019   MT: NOBLE      Name:     SELMA SAMUEL   MRN:      [id number]        Account:      [id number]   :      1945           Visit Date:   2019      Document: [id number]

## 2019-04-25 NOTE — NURSING NOTE
"Initial /83 (BP Location: Left arm, Patient Position: Chair, Cuff Size: Adult Regular)   Pulse 68   Resp 18  Estimated body mass index is 33.65 kg/m  as calculated from the following:    Height as of 5/4/17: 1.575 m (5' 2\").    Weight as of 11/19/18: 83.5 kg (184 lb). .    rey larios LPN    "

## 2019-04-29 LAB — COPATH REPORT: NORMAL

## 2019-05-02 ENCOUNTER — HOSPITAL ENCOUNTER (OUTPATIENT)
Dept: CT IMAGING | Facility: CLINIC | Age: 74
Discharge: HOME | End: 2019-05-02
Attending: UROLOGY | Admitting: UROLOGY
Payer: MEDICARE

## 2019-05-02 DIAGNOSIS — C68.9 UROTHELIAL CANCER (H): ICD-10-CM

## 2019-05-02 LAB
CREAT BLD-MCNC: 1.1 MG/DL (ref 0.52–1.04)
GFR SERPL CREATININE-BSD FRML MDRD: 48 ML/MIN/{1.73_M2}

## 2019-05-02 PROCEDURE — 82565 ASSAY OF CREATININE: CPT

## 2019-05-02 PROCEDURE — 25000128 H RX IP 250 OP 636: Performed by: RADIOLOGY

## 2019-05-02 PROCEDURE — 74177 CT ABD & PELVIS W/CONTRAST: CPT

## 2019-05-02 PROCEDURE — 25000125 ZZHC RX 250: Performed by: RADIOLOGY

## 2019-05-02 RX ORDER — IOPAMIDOL 755 MG/ML
90 INJECTION, SOLUTION INTRAVASCULAR ONCE
Status: COMPLETED | OUTPATIENT
Start: 2019-05-02 | End: 2019-05-02

## 2019-05-02 RX ADMIN — SODIUM CHLORIDE 62 ML: 9 INJECTION, SOLUTION INTRAVENOUS at 07:58

## 2019-05-02 RX ADMIN — IOPAMIDOL 90 ML: 755 INJECTION, SOLUTION INTRAVENOUS at 07:58

## 2019-09-03 ENCOUNTER — OFFICE VISIT (OUTPATIENT)
Dept: FAMILY MEDICINE | Facility: CLINIC | Age: 74
End: 2019-09-03
Payer: MEDICARE

## 2019-09-03 VITALS
DIASTOLIC BLOOD PRESSURE: 76 MMHG | BODY MASS INDEX: 32.74 KG/M2 | HEART RATE: 84 BPM | SYSTOLIC BLOOD PRESSURE: 122 MMHG | WEIGHT: 179 LBS | OXYGEN SATURATION: 96 % | TEMPERATURE: 98.2 F

## 2019-09-03 DIAGNOSIS — B02.9 HERPES ZOSTER WITHOUT COMPLICATION: Primary | ICD-10-CM

## 2019-09-03 PROCEDURE — 99213 OFFICE O/P EST LOW 20 MIN: CPT | Performed by: FAMILY MEDICINE

## 2019-09-03 RX ORDER — VALACYCLOVIR HYDROCHLORIDE 1 G/1
1000 TABLET, FILM COATED ORAL 3 TIMES DAILY
Qty: 21 TABLET | Refills: 0 | Status: SHIPPED | OUTPATIENT
Start: 2019-09-03 | End: 2021-04-27

## 2019-09-03 NOTE — PATIENT INSTRUCTIONS
Patient Education     Shingles (Herpes Zoster)     Talk to your healthcare provider about the shingles vaccine.     Shingles is also called herpes zoster. It is a painful skin rash caused by the herpes zoster virus. This is the same virus that causes chickenpox. After a person has chickenpox, the virus remains inactive in the nerve cells. Years later, the virus can become active again and travel to the skin. Most people have shingles only once, but it is possible to have it more than once.  What are the risk factors for shingles?  Anyone who has ever had chickenpox can develop shingles. But your risk is greater if you:    Are 50 years of age or older    Have an illness that weakens your immune system, such as HIV/AIDS    Have cancer, especially Hodgkin disease or lymphoma    Take medicines that weaken your immune system  What are the symptoms of shingles?    The first sign of shingles is usually pain, burning, tingling, or itching on one part of your face or body. You may also feel as if you have the flu, with fever and chills.    A red rash with small blisters appears within a few days. The rash may appear as follows:   ? The blisters can occur anywhere, but they re most common on the back, chest, or abdomen.  ? They usually appear on only one side of the body, spreading along the nerve pathway where the virus was inactive.   ? The rash can also form around an eye, along one side of the face or neck, or in the mouth.  ? In a few people, usually those with weakened immune systems, shingles appear on more than one part of the body at once.    After a few days, the blisters become dry and form a crust. The crust falls off in days to weeks. The blisters generally do not leave scars.  How is shingles treated?  For most people, shingles heals on its own in a few weeks. But treatment is recommended to help relieve pain, speed healing, and reduce the risk of complications. Antiviral medicines are prescribed within the  first 72 hours of the appearance of the rash. To lessen symptoms:    Apply ice packs (wrapped in a thin towel) or cool compresses, or soak in a cool bath.    Use calamine lotion to calm itchy skin.    Ask your healthcare provider about over-the-counter pain relievers. If your pain is severe, your healthcare provider may prescribe stronger pain medicines.  What are the complications of shingles?  Shingles often goes away with no lasting effects. But some people have serious problems long after the blisters have healed:    Postherpetic neuralgia. This is the most common complication. It is severe nerve pain at the place where the rash used to be. It can last for months, or even years after you have had shingles. Medicines can be prescribed to help relieve the pain and improve quality of life.    Bacterial infection. Shingles blisters may become infected with bacteria. Antibiotic medicine is used to treat the infection.    Eye problems. A person with shingles on the face should see his or her healthcare provider right away. Shingles can cause serious problems with vision, and even blindness.  Very rarely shingles can also lead to pneumonia, hearing problems, brain inflammation, or even death.   When to seek medical care  Contact your healthcare provider if you experience any of the following:    Symptoms that don t go away with treatment    A rash or blisters near your eye    Increased drainage, fever, or rash after treatment, or severe pain that doesn t go away   How can shingles be prevented?  You can only get shingles if you have had chickenpox in the past. Those who have never had chickenpox can get the virus from you. Although instead of developing shingles, the person may get chickenpox. Until your blisters form scabs, avoid contact with others, especially the following:    Pregnant women who have never had chickenpox or the vaccine    Infants who were born early (prematurely) or who had low weight at  birth    People with weak immune system (for example, people receiving chemotherapy for cancer, people who have had organ transplants, or people with HIV infections)     The shingles vaccine  Shingles vaccines are available to help prevent shingles or make it less painful. Vaccination is recommended for adults 50 and older, even if you've had shingles in the past. Talk with your healthcare provider about the most appropriate time for you to get vaccinated, and which vaccine is best for you.   Date Last Reviewed: 10/1/2016    8461-1317 [employer]. [address] [place] [address], [place] [address]. All rights reserved. This information is not intended as a substitute for professional medical care. Always follow your healthcare professional's instructions.

## 2019-09-03 NOTE — PROGRESS NOTES
Subjective     Allyson Samuel is a 74 year old female who presents to clinic today for the following health issues:    HPI   Rash  Onset: 4 days    Description:   Location: rectum, back of right leg  Character: raised, burning, red  Itching (Pruritis): YES- slight    Progression of Symptoms:  worsening    Accompanying Signs & Symptoms:  Fever: no   Body aches or joint pain: YES  Sore throat symptoms: no   Recent cold symptoms: no     History:   Previous similar rash: YES- from insect bites    Precipitating factors:   Exposure to similar rash: no   New exposures: None   Recent travel: no     Alleviating factors:  none    Therapies Tried and outcome: cortisone    She states that last year had some soreness in the leg but no rash.      Patient Active Problem List   Diagnosis     Macular degeneration, both eyes     CAD (coronary artery disease)     Hyperlipidemia LDL goal <100     HTN (hypertension)     Basal cell adenocarcinoma     Tobacco abuse     STEMI (ST elevation myocardial infarction) (H)     Adrenal mass (H)     Pelvic mass     HTN, goal below 140/90     History of MI (myocardial infarction)     Urothelial cancer (H)     CKD (chronic kidney disease) stage 3, GFR 30-59 ml/min (H)     History of kidney removal     Past Surgical History:   Procedure Laterality Date     CARDIAC SURGERY       CYSTOSCOPY, BIOPSY BLADDER, COMBINED Right 4/14/2016    Procedure: COMBINED CYSTOSCOPY, BIOPSY BLADDER;  Surgeon: Forest Jim MD;  Location: [facility]     CYSTOSCOPY, REMOVE STENT(S), COMBINED Left 4/14/2015    Procedure: COMBINED CYSTOSCOPY, REMOVE STENT(S);  Surgeon: Forest Jim MD;  Location: [facility]     CYSTOSCOPY, RETROGRADES, COMBINED Left 10/21/2015    Procedure: COMBINED CYSTOSCOPY, RETROGRADES;  Surgeon: Forest Jim MD;  Location: [facility]     CYSTOSCOPY, RETROGRADES, COMBINED Right 5/4/2017    Procedure: COMBINED CYSTOSCOPY, RETROGRADES;  Cystoscopy,Right Retrograde Pyelogram;   Surgeon: Forest Jim MD;  Location: [facility]     CYSTOSCOPY, RETROGRADES, INSERT STENT URETER(S), COMBINED Left 3/12/2015    Procedure: COMBINED CYSTOSCOPY, RETROGRADES, INSERT STENT URETER(S);  Surgeon: Forest Jim MD;  Location: [facility]     DAVINCI NEPHROURETERECTOMY Left 4/14/2015    Procedure: DAVINCI NEPHROURETERECTOMY;  Surgeon: Forest Jim MD;  Location: [facility]     GYN SURGERY       HYSTERECTOMY      age 40, fibroids. Still has right ovary       Social History     Tobacco Use     Smoking status: Current Every Day Smoker     Packs/day: 0.10     Types: Cigarettes     Smokeless tobacco: Never Used     Tobacco comment: Trying hard to quit 9/24/15.  smokes about 2-3 cig per day   Substance Use Topics     Alcohol use: Yes     Comment: few drinks per month     Family History   Problem Relation Age of Onset     Breast Cancer Mother      Cerebrovascular Disease Father      Heart Disease Brother         pacemaker           Reviewed and updated as needed this visit by Provider         Review of Systems   ROS COMP: Constitutional, HEENT, cardiovascular, pulmonary, gi and gu systems are negative, except as otherwise noted.      Objective    /76   Pulse 84   Temp 98.2  F (36.8  C) (Oral)   Wt 81.2 kg (179 lb)   SpO2 96%   BMI 32.74 kg/m    Body mass index is 32.74 kg/m .  Physical Exam   GENERAL: healthy, alert and no distress  RESP: lungs clear to auscultation - no rales, rhonchi or wheezes  CV: regular rate and rhythm, normal S1 S2, no S3 or S4, no murmur, click or rub, no peripheral edema and peripheral pulses strong  Rash on back of right leg and right buttock - both groups of closed vesicles and opened scabbing lesions.    Diagnostic Test Results:  none         Assessment & Plan     1. Herpes zoster without complication: treat with Valtrex. Discussed course and prognosis as well as ongoing symptomatic cares. Follow up if not improving. Once resolved, encouraged  getting Shingrix vaccination.  - valACYclovir (VALTREX) 1000 mg tablet; Take 1 tablet (1,000 mg) by mouth 3 times daily for 7 days  Dispense: 21 tablet; Refill: 0       Return in about 2 weeks (around 9/17/2019) for follow up if symptoms not improving.    Franco Maxwell,   [facility]

## 2019-10-24 ENCOUNTER — OFFICE VISIT (OUTPATIENT)
Dept: UROLOGY | Facility: CLINIC | Age: 74
End: 2019-10-24
Payer: MEDICARE

## 2019-10-24 VITALS — DIASTOLIC BLOOD PRESSURE: 98 MMHG | HEART RATE: 70 BPM | SYSTOLIC BLOOD PRESSURE: 163 MMHG | RESPIRATION RATE: 18 BRPM

## 2019-10-24 DIAGNOSIS — C68.9 UROTHELIAL CANCER (H): Primary | ICD-10-CM

## 2019-10-24 DIAGNOSIS — N83.209 CYST OF OVARY, UNSPECIFIED LATERALITY: ICD-10-CM

## 2019-10-24 PROCEDURE — 88112 CYTOPATH CELL ENHANCE TECH: CPT | Performed by: UROLOGY

## 2019-10-24 PROCEDURE — 88112 CYTOPATH CELL ENHANCE TECH: CPT | Mod: 26 | Performed by: UROLOGY

## 2019-10-24 PROCEDURE — 52000 CYSTOURETHROSCOPY: CPT | Performed by: UROLOGY

## 2019-10-24 NOTE — NURSING NOTE
"Initial BP (!) 163/98 (BP Location: Left arm, Patient Position: Chair, Cuff Size: Adult Regular)   Pulse 70   Resp 18  Estimated body mass index is 32.74 kg/m  as calculated from the following:    Height as of 5/4/17: 1.575 m (5' 2\").    Weight as of 9/3/19: 81.2 kg (179 lb). .    Patient is here for a cysto.  rey larios LPN    "

## 2019-10-25 NOTE — PROGRESS NOTES
Visit Date:   10/24/2019      REASON FOR VISIT TODAY:  History of bladder cancer.      HISTORY:  Ms. Samuel is a 74-year-old woman followed in our clinic for history of urothelial cancer.  The patient was diagnosed with left upper tract urothelial carcinoma and underwent a robotic-assisted left laparoscopic nephroureterectomy on 2015.  Final pathology demonstrated high-grade T1 disease with negative margins.  She had bladder recurrences in 2015, 2016, 2016 and 2017 with low-grade superficial lesions but has not had any recurrences since then.  She comes in today noting no blood in the urine since we last saw her.      PROCEDURE NOTE:  After informed consent was obtained, the patient was prepped and draped in standard sterile fashion.  A flexible cystoscope was introduced into the patient's bladder without difficulty.  Inspection of the bladder revealed evidence of some chronic inflammation with raised bullous lesions all over the bladder.  There were no papillary appearing lesions noted in the patient's bladder.  The patient left urine today for cytology as well.      ASSESSMENT AND PLAN:  I suggested to Mrs. Samuel that we are pleased to see there is no obvious evidence of recurrence in her bladder.  She will call back next week for the results of the cytology.  Assuming this all looks okay, we will see her back in 6 months for her next surveillance cystoscopy.  At that point we will likely go to yearly cystoscopies after that.         KATTY FERMIN MD             D: 10/24/2019   T: 10/24/2019   MT: STORM      Name:     SELMA SAMUEL   MRN:      [id number]        Account:      [id number]   :      1945           Visit Date:   10/24/2019      Document: [id number]

## 2019-10-28 LAB — COPATH REPORT: NORMAL

## 2020-01-14 ENCOUNTER — TRANSFERRED RECORDS (OUTPATIENT)
Dept: HEALTH INFORMATION MANAGEMENT | Facility: CLINIC | Age: 75
End: 2020-01-14

## 2020-03-01 ENCOUNTER — HEALTH MAINTENANCE LETTER (OUTPATIENT)
Age: 75
End: 2020-03-01

## 2020-04-16 ENCOUNTER — TELEPHONE (OUTPATIENT)
Dept: UROLOGY | Facility: CLINIC | Age: 75
End: 2020-04-16

## 2020-04-16 NOTE — TELEPHONE ENCOUNTER
I spoke to Allyson today and just let her know that the [employer] would likely be calling her tomorrow. They will be sending out the urine cup for collection. I also told her that Jazzy will call her on Tuesday when she returns to clinic incase she has any other questions. Fabiola CHAVEZ Rn

## 2020-04-16 NOTE — TELEPHONE ENCOUNTER
Reason for Call:  Other call back    Detailed comments: pt calling stating she spoke to brandon and cannot remember when she is suppose to have someone come and do her UA?    Phone Number Patient can be reached at: Home number on file [phone] (home)    Best Time: any     Can we leave a detailed message on this number? YES    Call taken on 4/16/2020 at 3:23 PM by Kiki Reyes

## 2020-05-12 ENCOUNTER — TRANSFERRED RECORDS (OUTPATIENT)
Dept: HEALTH INFORMATION MANAGEMENT | Facility: CLINIC | Age: 75
End: 2020-05-12

## 2020-06-15 DIAGNOSIS — I25.2 HISTORY OF MI (MYOCARDIAL INFARCTION): ICD-10-CM

## 2020-06-15 RX ORDER — LISINOPRIL 20 MG/1
20 TABLET ORAL DAILY
Qty: 90 TABLET | Refills: 3 | OUTPATIENT
Start: 2020-06-15

## 2020-06-15 RX ORDER — METOPROLOL SUCCINATE 25 MG/1
25 TABLET, EXTENDED RELEASE ORAL DAILY
Qty: 90 TABLET | Refills: 3 | OUTPATIENT
Start: 2020-06-15

## 2020-06-15 NOTE — TELEPHONE ENCOUNTER
Pending Prescriptions:                       Disp   Refills    lisinopril (ZESTRIL) 20 MG tablet         90 tab*3            Sig: Take 1 tablet (20 mg) by mouth daily    metoprolol succinate ER (TOPROL-XL) 25 MG*90 tab*3            Sig: Take 1 tablet (25 mg) by mouth daily (Needs           office visit)    .  [facility] - [place] - [address] [facility]  [address] [facility] [address]  Phone: [phone] Fax: [phone]    Mahogany Lerner on 6/15/2020 at 1:56 PM

## 2020-06-15 NOTE — TELEPHONE ENCOUNTER
"Requested Prescriptions   Pending Prescriptions Disp Refills     lisinopril (ZESTRIL) 20 MG tablet 90 tablet 3     Sig: Take 1 tablet (20 mg) by mouth daily       ACE Inhibitors (Including Combos) Protocol Failed - 6/15/2020  2:17 PM        Failed - Blood pressure under 140/90 in past 12 months     BP Readings from Last 3 Encounters:   10/24/19 (!) 163/98   09/03/19 122/76   04/25/19 171/83                 Failed - Recent (12 mo) or future (30 days) visit within the authorizing provider's specialty     Patient has had an office visit with the authorizing provider or a provider within the authorizing providers department within the previous 12 mos or has a future within next 30 days. See \"Patient Info\" tab in inbasket, or \"Choose Columns\" in Meds & Orders section of the refill encounter.              Failed - Normal serum creatinine on file in past 12 months     Recent Labs   Lab Test 05/02/19  0800 11/16/18  1245   CR  --  1.14*   CREAT 1.1*  --        Ok to refill medication if creatinine is low          Failed - Normal serum potassium on file in past 12 months     Recent Labs   Lab Test 11/16/18  1245   POTASSIUM 4.9             Passed - Medication is active on med list        Passed - Patient is age 18 or older        Passed - No active pregnancy on record        Passed - No positive pregnancy test within past 12 months           metoprolol succinate ER (TOPROL-XL) 25 MG 24 hr tablet 90 tablet 3     Sig: Take 1 tablet (25 mg) by mouth daily (Needs office visit)       Beta-Blockers Protocol Failed - 6/15/2020  2:17 PM        Failed - Blood pressure under 140/90 in past 12 months     BP Readings from Last 3 Encounters:   10/24/19 (!) 163/98   09/03/19 122/76   04/25/19 171/83                 Failed - Recent (12 mo) or future (30 days) visit within the authorizing provider's specialty     Patient has had an office visit with the authorizing provider or a provider within the authorizing providers department within " "the previous 12 mos or has a future within next 30 days. See \"Patient Info\" tab in inbasket, or \"Choose Columns\" in Meds & Orders section of the refill encounter.              Passed - Patient is age 6 or older        Passed - Medication is active on med list             "

## 2020-06-15 NOTE — TELEPHONE ENCOUNTER
Patient has not been seen in our clinic since 2017.  Called pharmacy and questioned if they were requesting this of the correct provider as patient lives in [place].  Patient requested this refill from us.  She will need to establish care again. Sue SMITH RN

## 2020-06-30 DIAGNOSIS — I25.2 HISTORY OF MI (MYOCARDIAL INFARCTION): ICD-10-CM

## 2020-06-30 RX ORDER — METOPROLOL SUCCINATE 25 MG/1
25 TABLET, EXTENDED RELEASE ORAL DAILY
Qty: 90 TABLET | Refills: 0 | Status: SHIPPED | OUTPATIENT
Start: 2020-06-30 | End: 2021-04-27

## 2020-06-30 RX ORDER — LISINOPRIL 20 MG/1
20 TABLET ORAL DAILY
Qty: 90 TABLET | Refills: 0 | Status: SHIPPED | OUTPATIENT
Start: 2020-06-30 | End: 2021-04-27

## 2020-07-21 ENCOUNTER — TRANSFERRED RECORDS (OUTPATIENT)
Dept: HEALTH INFORMATION MANAGEMENT | Facility: CLINIC | Age: 75
End: 2020-07-21

## 2020-10-06 ENCOUNTER — TRANSFERRED RECORDS (OUTPATIENT)
Dept: HEALTH INFORMATION MANAGEMENT | Facility: CLINIC | Age: 75
End: 2020-10-06

## 2020-12-14 ENCOUNTER — HEALTH MAINTENANCE LETTER (OUTPATIENT)
Age: 75
End: 2020-12-14

## 2021-01-26 ENCOUNTER — TRANSFERRED RECORDS (OUTPATIENT)
Dept: HEALTH INFORMATION MANAGEMENT | Facility: CLINIC | Age: 76
End: 2021-01-26

## 2021-04-13 ENCOUNTER — TRANSFERRED RECORDS (OUTPATIENT)
Dept: HEALTH INFORMATION MANAGEMENT | Facility: CLINIC | Age: 76
End: 2021-04-13

## 2021-04-17 ENCOUNTER — HEALTH MAINTENANCE LETTER (OUTPATIENT)
Age: 76
End: 2021-04-17

## 2021-04-27 ENCOUNTER — OFFICE VISIT (OUTPATIENT)
Dept: FAMILY MEDICINE | Facility: CLINIC | Age: 76
End: 2021-04-27
Payer: MEDICARE

## 2021-04-27 VITALS
HEART RATE: 87 BPM | RESPIRATION RATE: 14 BRPM | WEIGHT: 184 LBS | TEMPERATURE: 97.4 F | SYSTOLIC BLOOD PRESSURE: 134 MMHG | HEIGHT: 62 IN | OXYGEN SATURATION: 96 % | DIASTOLIC BLOOD PRESSURE: 82 MMHG | BODY MASS INDEX: 33.86 KG/M2

## 2021-04-27 DIAGNOSIS — E78.5 HYPERLIPIDEMIA LDL GOAL <100: ICD-10-CM

## 2021-04-27 DIAGNOSIS — H26.9 CATARACT OF RIGHT EYE, UNSPECIFIED CATARACT TYPE: ICD-10-CM

## 2021-04-27 DIAGNOSIS — N83.209 CYST OF OVARY, UNSPECIFIED LATERALITY: ICD-10-CM

## 2021-04-27 DIAGNOSIS — I25.2 HISTORY OF MI (MYOCARDIAL INFARCTION): ICD-10-CM

## 2021-04-27 DIAGNOSIS — Z13.1 SCREENING FOR DIABETES MELLITUS: ICD-10-CM

## 2021-04-27 DIAGNOSIS — Z01.818 PREOP GENERAL PHYSICAL EXAM: Primary | ICD-10-CM

## 2021-04-27 PROCEDURE — 99214 OFFICE O/P EST MOD 30 MIN: CPT | Performed by: FAMILY MEDICINE

## 2021-04-27 RX ORDER — METOPROLOL SUCCINATE 25 MG/1
25 TABLET, EXTENDED RELEASE ORAL DAILY
Qty: 90 TABLET | Refills: 0 | Status: SHIPPED | OUTPATIENT
Start: 2021-04-27 | End: 2024-08-11

## 2021-04-27 RX ORDER — ATORVASTATIN CALCIUM 80 MG/1
80 TABLET, FILM COATED ORAL DAILY
Qty: 90 TABLET | Refills: 3 | Status: SHIPPED | OUTPATIENT
Start: 2021-04-27 | End: 2024-08-11

## 2021-04-27 RX ORDER — LISINOPRIL 20 MG/1
20 TABLET ORAL DAILY
Qty: 90 TABLET | Refills: 0 | Status: SHIPPED | OUTPATIENT
Start: 2021-04-27 | End: 2024-08-11

## 2021-04-27 ASSESSMENT — PAIN SCALES - GENERAL: PAINLEVEL: NO PAIN (0)

## 2021-04-27 ASSESSMENT — MIFFLIN-ST. JEOR: SCORE: 1267.87

## 2021-04-27 NOTE — PROGRESS NOTES
[address] [address]  Phone: [phone]  Primary Provider: Rita Massey  Pre-op Performing Provider: TANYA FITZPATRICK      PREOPERATIVE EVALUATION:  Today's date: 4/27/2021    Allyson Samuel is a 76 year old female who presents for a preoperative evaluation.    Surgical Information:  Surgery/Procedure: Rt Eye Cataract  and tube for pressure release of Glaucoma   Surgery Location: [facility]   Surgeon: Leandro   Surgery Date: 4/30/21  Time of Surgery: 8:30  Where patient plans to recover: At home with family  Fax number for surgical facility: [phone]    Type of Anesthesia Anticipated: to be determined    Assessment & Plan     The proposed surgical procedure is considered LOW risk.    1. Preop general physical exam    2. Cataract of right eye, unspecified cataract type    3. Cyst of ovary, unspecified laterality: patient requested referral to gynecology  - OB/GYN REFERRAL    4. History of MI (myocardial infarction): medications refilled but due for follow up with cardiology as well.  - lisinopril (ZESTRIL) 20 MG tablet; Take 1 tablet (20 mg) by mouth daily  Dispense: 90 tablet; Refill: 0  - metoprolol succinate ER (TOPROL-XL) 25 MG 24 hr tablet; Take 1 tablet (25 mg) by mouth daily (Needs office visit)  Dispense: 90 tablet; Refill: 0    5. Hyperlipidemia LDL goal <100  - atorvastatin (LIPITOR) 80 MG tablet; Take 1 tablet (80 mg) by mouth daily  Dispense: 90 tablet; Refill: 3  - Lipid panel reflex to direct LDL Fasting; Future    6. Screening for diabetes mellitus  - **Comprehensive metabolic panel FUTURE anytime; Future    Possible Sleep Apnea:    STOP-Bang Total Score 4/27/2021   Total Score 3   Risk Stratification 3 - 4: Moderate Risk for ELVIRA         Medication Instructions:  Patient is to take all scheduled medications on the day of surgery    RECOMMENDATION:  APPROVAL GIVEN to proceed with proposed procedure, without further  diagnostic evaluation.    Subjective     HPI related to upcoming procedure: History of right cataract and glaucoma      Preop Questions 4/27/2021   1. Have you ever had a heart attack or stroke? YES   2. Have you ever had surgery on your heart or blood vessels, such as a stent placement, a coronary artery bypass, or surgery on an artery in your head, neck, heart, or legs? YES   3. Do you have chest pain with activity? No   4. Do you have a history of  heart failure? No   5. Do you currently have a cold, bronchitis or symptoms of other infection? No   6. Do you have a cough, shortness of breath, or wheezing? No   7. Do you or anyone in your family have previous history of blood clots? No   8. Do you or does anyone in your family have a serious bleeding problem such as prolonged bleeding following surgeries or cuts? No   9. Have you ever had problems with anemia or been told to take iron pills? No   10. Have you had any abnormal blood loss such as black, tarry or bloody stools, or abnormal vaginal bleeding? No   11. Have you ever had a blood transfusion? No   12. Are you willing to have a blood transfusion if it is medically needed before, during, or after your surgery? Yes   13. Have you or any of your relatives ever had problems with anesthesia? YES - sometimes she gets nauseated - has received antiemetics in the past   14. Do you have sleep apnea, excessive snoring or daytime drowsiness? YES   14a. Do you have a CPAP machine? No   15. Do you have any artifical heart valves or other implanted medical devices like a pacemaker, defibrillator, or continuous glucose monitor? No   16. Do you have artificial joints? No   17. Are you allergic to latex? YES     Health Care Directive:  Patient does not have a Health Care Directive or Living Will: Advance Directive received and scanned. Click on Code in the patient header to view.    Preoperative Review of :   reviewed - no record of controlled substances  prescribed.      Status of Chronic Conditions:  See problem list for active medical problems.  Problems all longstanding and stable, except as noted/documented.  See ROS for pertinent symptoms related to these conditions.      Review of Systems  CONSTITUTIONAL: NEGATIVE for fever, chills, change in weight  ENT/MOUTH: NEGATIVE for ear, mouth and throat problems  RESP: NEGATIVE for significant cough or SOB  CV: NEGATIVE for chest pain, palpitations or peripheral edema    Patient Active Problem List    Diagnosis Date Noted     CKD (chronic kidney disease) stage 3, GFR 30-59 ml/min 10/19/2015     Priority: Medium     History of kidney removal 10/19/2015     Priority: Medium     Left kidney removed surgically due to adrenal mass         History of MI (myocardial infarction) 04/08/2015     Priority: Medium     Urothelial cancer (H) 04/08/2015     Priority: Medium     HTN, goal below 140/90 01/26/2015     Priority: Medium     Adrenal mass (H) 01/24/2015     Priority: Medium     Pelvic mass 01/24/2015     Priority: Medium     STEMI (ST elevation myocardial infarction) (H) 06/30/2014     Priority: Medium     6/2014 - new stent placed       Macular degeneration, both eyes 06/18/2012     Priority: Medium     CAD (coronary artery disease) 06/18/2012     Priority: Medium     MI/ stent in 2003       Hyperlipidemia LDL goal <100 06/18/2012     Priority: Medium     HTN (hypertension) 06/18/2012     Priority: Medium     Basal cell adenocarcinoma 06/18/2012     Priority: Medium     face       Tobacco abuse 06/18/2012     Priority: Medium      Past Medical History:   Diagnosis Date     CAD (coronary artery disease)     2003- MI with PCI to RCA, 6/2014 inferior STEMI- instent thrombosis RCA s/p PCI     Heart attack (H)      HTN (hypertension)      Hyperlipidemia LDL goal <70      Macular degeneration      PONV (postoperative nausea and vomiting)      Tobacco use      Past Surgical History:   Procedure Laterality Date     CARDIAC  SURGERY       CYSTOSCOPY, BIOPSY BLADDER, COMBINED Right 4/14/2016    Procedure: COMBINED CYSTOSCOPY, BIOPSY BLADDER;  Surgeon: Forest Jim MD;  Location: [facility]     CYSTOSCOPY, REMOVE STENT(S), COMBINED Left 4/14/2015    Procedure: COMBINED CYSTOSCOPY, REMOVE STENT(S);  Surgeon: Forest Jim MD;  Location: [facility]     CYSTOSCOPY, RETROGRADES, COMBINED Left 10/21/2015    Procedure: COMBINED CYSTOSCOPY, RETROGRADES;  Surgeon: Forest Jim MD;  Location: [facility]     CYSTOSCOPY, RETROGRADES, COMBINED Right 5/4/2017    Procedure: COMBINED CYSTOSCOPY, RETROGRADES;  Cystoscopy,Right Retrograde Pyelogram;  Surgeon: Forest Jim MD;  Location: [facility]     CYSTOSCOPY, RETROGRADES, INSERT STENT URETER(S), COMBINED Left 3/12/2015    Procedure: COMBINED CYSTOSCOPY, RETROGRADES, INSERT STENT URETER(S);  Surgeon: Forest Jim MD;  Location: [facility]     DAVINCI NEPHROURETERECTOMY Left 4/14/2015    Procedure: DAVINCI NEPHROURETERECTOMY;  Surgeon: Forest Jim MD;  Location: [facility]     GYN SURGERY       HYSTERECTOMY      age 40, fibroids. Still has right ovary     Current Outpatient Medications   Medication Sig Dispense Refill     acetaminophen 650 MG TABS Take 650 mg by mouth every 6 hours 100 tablet 0     aspirin 81 MG tablet Take 1 tablet (81 mg) by mouth daily 30 tablet      atorvastatin (LIPITOR) 80 MG tablet Take 1 tablet (80 mg) by mouth daily 90 tablet 3     lisinopril (ZESTRIL) 20 MG tablet Take 1 tablet (20 mg) by mouth daily 90 tablet 0     metoprolol succinate ER (TOPROL-XL) 25 MG 24 hr tablet Take 1 tablet (25 mg) by mouth daily (Needs office visit) 90 tablet 0     nitroglycerin (NITROSTAT) 0.4 MG SL tablet Place 1 tablet (0.4 mg) under the tongue every 5 minutes as needed for chest pain 25 tablet 3     timolol maleate (ISTALOL) 0.5 % opthalmic solution Apply 1 drop to eye 2 times daily Left       travoprost Z, benzalkonium, (TRAVATAN Z)  "0.004 % ophthalmic solution Place 1 drop into both eyes At Bedtime          Allergies   Allergen Reactions     Latex Rash     Bees      Macrobid [Nitrofurantoin] Nausea        Social History     Tobacco Use     Smoking status: Current Every Day Smoker     Packs/day: 0.10     Types: Cigarettes     Smokeless tobacco: Never Used     Tobacco comment: Trying hard to quit 9/24/15.  smokes about 2-3 cig per day   Substance Use Topics     Alcohol use: Yes     Comment: few drinks per month     Family History   Problem Relation Age of Onset     Breast Cancer Mother      Cerebrovascular Disease Father      Heart Disease Brother         pacemaker     History   Drug Use No         Objective     /82   Pulse 87   Temp 97.4  F (36.3  C) (Tympanic)   Resp 14   Ht 1.575 m (5' 2\")   Wt 83.5 kg (184 lb)   SpO2 96%   BMI 33.65 kg/m      Physical Exam  GENERAL APPEARANCE: healthy, alert and no distress  HENT: ear canals and TM's normal and nose and mouth without ulcers or lesions  RESP: lungs clear to auscultation - no rales, rhonchi or wheezes  CV: regular rate and rhythm, normal S1 S2, no S3 or S4 and no murmur, click or rub   NEURO: Normal strength and tone, sensory exam grossly normal, mentation intact and speech normal    No results for input(s): HGB, PLT, INR, NA, POTASSIUM, CR, A1C in the last [id number] hours.     Diagnostics:  No labs were ordered during this visit.   No EKG required for low risk surgery (cataract, skin procedure, breast biopsy, etc).    Revised Cardiac Risk Index (RCRI):  The patient has the following serious cardiovascular risks for perioperative complications:   - Coronary Artery Disease (MI, positive stress test, angina, Qs on EKG) = 1 point     RCRI Interpretation: 1 point: Class II (low risk - 0.9% complication rate)           Signed Electronically by: Franco Maxwell DO  Copy of this evaluation report is provided to requesting physician.      "

## 2021-04-27 NOTE — PATIENT INSTRUCTIONS

## 2021-10-02 ENCOUNTER — HEALTH MAINTENANCE LETTER (OUTPATIENT)
Age: 76
End: 2021-10-02

## 2022-05-14 ENCOUNTER — HEALTH MAINTENANCE LETTER (OUTPATIENT)
Age: 77
End: 2022-05-14

## 2023-01-14 ENCOUNTER — HEALTH MAINTENANCE LETTER (OUTPATIENT)
Age: 78
End: 2023-01-14

## 2023-06-02 ENCOUNTER — HEALTH MAINTENANCE LETTER (OUTPATIENT)
Age: 78
End: 2023-06-02

## 2024-08-11 ENCOUNTER — APPOINTMENT (OUTPATIENT)
Dept: CT IMAGING | Facility: CLINIC | Age: 79
DRG: 871 | End: 2024-08-11
Attending: EMERGENCY MEDICINE
Payer: MEDICARE

## 2024-08-11 ENCOUNTER — HOSPITAL ENCOUNTER (INPATIENT)
Facility: CLINIC | Age: 79
LOS: 6 days | Discharge: HOME | DRG: 871 | End: 2024-08-17
Attending: EMERGENCY MEDICINE | Admitting: INTERNAL MEDICINE
Payer: MEDICARE

## 2024-08-11 ENCOUNTER — APPOINTMENT (OUTPATIENT)
Dept: GENERAL RADIOLOGY | Facility: CLINIC | Age: 79
DRG: 871 | End: 2024-08-11
Attending: EMERGENCY MEDICINE
Payer: MEDICARE

## 2024-08-11 DIAGNOSIS — J96.01 ACUTE RESPIRATORY FAILURE WITH HYPOXIA AND HYPERCAPNIA (H): Primary | ICD-10-CM

## 2024-08-11 DIAGNOSIS — I50.21 ACUTE SYSTOLIC CONGESTIVE HEART FAILURE (H): ICD-10-CM

## 2024-08-11 DIAGNOSIS — J96.02 ACUTE RESPIRATORY FAILURE WITH HYPOXIA AND HYPERCAPNIA (H): Primary | ICD-10-CM

## 2024-08-11 DIAGNOSIS — J18.9 PNEUMONIA OF BOTH LUNGS DUE TO INFECTIOUS ORGANISM, UNSPECIFIED PART OF LUNG: ICD-10-CM

## 2024-08-11 LAB
ABO/RH(D): NORMAL
ALBUMIN SERPL BCG-MCNC: 4.4 G/DL (ref 3.5–5.2)
ALP SERPL-CCNC: 101 U/L (ref 40–150)
ALT SERPL W P-5'-P-CCNC: 15 U/L (ref 0–50)
ANION GAP SERPL CALCULATED.3IONS-SCNC: 18 MMOL/L (ref 7–15)
ANTIBODY SCREEN: NEGATIVE
APTT PPP: 26 SECONDS (ref 22–38)
AST SERPL W P-5'-P-CCNC: 25 U/L (ref 0–45)
BASOPHILS # BLD AUTO: 0.1 10E3/UL (ref 0–0.2)
BASOPHILS NFR BLD AUTO: 1 %
BILIRUB SERPL-MCNC: 0.5 MG/DL
BUN SERPL-MCNC: 12.5 MG/DL (ref 8–23)
CALCIUM SERPL-MCNC: 9.2 MG/DL (ref 8.8–10.4)
CHLORIDE SERPL-SCNC: 97 MMOL/L (ref 98–107)
CREAT SERPL-MCNC: 1.16 MG/DL (ref 0.51–0.95)
EGFRCR SERPLBLD CKD-EPI 2021: 47 ML/MIN/1.73M2
EOSINOPHIL # BLD AUTO: 0 10E3/UL (ref 0–0.7)
EOSINOPHIL NFR BLD AUTO: 0 %
ERYTHROCYTE [DISTWIDTH] IN BLOOD BY AUTOMATED COUNT: 15 % (ref 10–15)
FLUAV RNA SPEC QL NAA+PROBE: NEGATIVE
FLUBV RNA RESP QL NAA+PROBE: NEGATIVE
GLUCOSE SERPL-MCNC: 241 MG/DL (ref 70–99)
HCO3 BLDV-SCNC: 20 MMOL/L (ref 21–28)
HCO3 BLDV-SCNC: 20 MMOL/L (ref 21–28)
HCO3 SERPL-SCNC: 17 MMOL/L (ref 22–29)
HCT VFR BLD AUTO: 50.4 % (ref 35–47)
HGB BLD-MCNC: 16.2 G/DL (ref 11.7–15.7)
HOLD SPECIMEN: NORMAL
IMM GRANULOCYTES # BLD: 0.1 10E3/UL
IMM GRANULOCYTES NFR BLD: 1 %
INR PPP: 1.02 (ref 0.85–1.15)
LACTATE BLD-SCNC: 2 MMOL/L
LACTATE BLD-SCNC: 3.8 MMOL/L
LACTATE SERPL-SCNC: 4.2 MMOL/L (ref 0.7–2)
LYMPHOCYTES # BLD AUTO: 3.9 10E3/UL (ref 0.8–5.3)
LYMPHOCYTES NFR BLD AUTO: 24 %
MAGNESIUM SERPL-MCNC: 2 MG/DL (ref 1.7–2.3)
MCH RBC QN AUTO: 30.1 PG (ref 26.5–33)
MCHC RBC AUTO-ENTMCNC: 32.1 G/DL (ref 31.5–36.5)
MCV RBC AUTO: 94 FL (ref 78–100)
MONOCYTES # BLD AUTO: 1.4 10E3/UL (ref 0–1.3)
MONOCYTES NFR BLD AUTO: 9 %
NEUTROPHILS # BLD AUTO: 10.6 10E3/UL (ref 1.6–8.3)
NEUTROPHILS NFR BLD AUTO: 66 %
NRBC # BLD AUTO: 0 10E3/UL
NRBC BLD AUTO-RTO: 0 /100
NT-PROBNP SERPL-MCNC: 3103 PG/ML (ref 0–1800)
PCO2 BLDV: 47 MM HG (ref 40–50)
PCO2 BLDV: 62 MM HG (ref 40–50)
PH BLDV: 7.11 [PH] (ref 7.32–7.43)
PH BLDV: 7.23 [PH] (ref 7.32–7.43)
PHOSPHATE SERPL-MCNC: 4.3 MG/DL (ref 2.5–4.5)
PLATELET # BLD AUTO: 191 10E3/UL (ref 150–450)
PO2 BLDV: 48 MM HG (ref 25–47)
PO2 BLDV: 60 MM HG (ref 25–47)
POTASSIUM SERPL-SCNC: 4.3 MMOL/L (ref 3.4–5.3)
PROCALCITONIN SERPL IA-MCNC: 0.08 NG/ML
PROT SERPL-MCNC: 8 G/DL (ref 6.4–8.3)
RBC # BLD AUTO: 5.38 10E6/UL (ref 3.8–5.2)
RSV RNA SPEC NAA+PROBE: NEGATIVE
SAO2 % BLDV: 76 % (ref 70–75)
SAO2 % BLDV: 80 % (ref 70–75)
SARS-COV-2 RNA RESP QL NAA+PROBE: NEGATIVE
SODIUM SERPL-SCNC: 132 MMOL/L (ref 135–145)
SPECIMEN EXPIRATION DATE: NORMAL
TROPONIN T SERPL HS-MCNC: 57 NG/L
TSH SERPL DL<=0.005 MIU/L-ACNC: 1.1 UIU/ML (ref 0.3–4.2)
WBC # BLD AUTO: 16.2 10E3/UL (ref 4–11)

## 2024-08-11 PROCEDURE — 93005 ELECTROCARDIOGRAM TRACING: CPT

## 2024-08-11 PROCEDURE — 85610 PROTHROMBIN TIME: CPT | Performed by: EMERGENCY MEDICINE

## 2024-08-11 PROCEDURE — 250N000009 HC RX 250: Performed by: EMERGENCY MEDICINE

## 2024-08-11 PROCEDURE — 82803 BLOOD GASES ANY COMBINATION: CPT

## 2024-08-11 PROCEDURE — 250N000011 HC RX IP 250 OP 636: Performed by: EMERGENCY MEDICINE

## 2024-08-11 PROCEDURE — 36415 COLL VENOUS BLD VENIPUNCTURE: CPT | Performed by: EMERGENCY MEDICINE

## 2024-08-11 PROCEDURE — 84443 ASSAY THYROID STIM HORMONE: CPT | Performed by: EMERGENCY MEDICINE

## 2024-08-11 PROCEDURE — 83880 ASSAY OF NATRIURETIC PEPTIDE: CPT | Performed by: EMERGENCY MEDICINE

## 2024-08-11 PROCEDURE — 258N000003 HC RX IP 258 OP 636: Performed by: EMERGENCY MEDICINE

## 2024-08-11 PROCEDURE — 999N000157 HC STATISTIC RCP TIME EA 10 MIN

## 2024-08-11 PROCEDURE — 83735 ASSAY OF MAGNESIUM: CPT | Performed by: EMERGENCY MEDICINE

## 2024-08-11 PROCEDURE — 120N000013 HC R&B IMCU

## 2024-08-11 PROCEDURE — 99291 CRITICAL CARE FIRST HOUR: CPT | Mod: 25

## 2024-08-11 PROCEDURE — 83036 HEMOGLOBIN GLYCOSYLATED A1C: CPT | Performed by: INTERNAL MEDICINE

## 2024-08-11 PROCEDURE — 96366 THER/PROPH/DIAG IV INF ADDON: CPT

## 2024-08-11 PROCEDURE — 71045 X-RAY EXAM CHEST 1 VIEW: CPT

## 2024-08-11 PROCEDURE — 84100 ASSAY OF PHOSPHORUS: CPT | Performed by: INTERNAL MEDICINE

## 2024-08-11 PROCEDURE — 71275 CT ANGIOGRAPHY CHEST: CPT | Mod: MF

## 2024-08-11 PROCEDURE — 86900 BLOOD TYPING SEROLOGIC ABO: CPT | Performed by: EMERGENCY MEDICINE

## 2024-08-11 PROCEDURE — 83605 ASSAY OF LACTIC ACID: CPT | Performed by: EMERGENCY MEDICINE

## 2024-08-11 PROCEDURE — 5A09357 ASSISTANCE WITH RESPIRATORY VENTILATION, LESS THAN 24 CONSECUTIVE HOURS, CONTINUOUS POSITIVE AIRWAY PRESSURE: ICD-10-PCS | Performed by: EMERGENCY MEDICINE

## 2024-08-11 PROCEDURE — 93005 ELECTROCARDIOGRAM TRACING: CPT | Mod: 76

## 2024-08-11 PROCEDURE — 84145 PROCALCITONIN (PCT): CPT | Performed by: EMERGENCY MEDICINE

## 2024-08-11 PROCEDURE — 80053 COMPREHEN METABOLIC PANEL: CPT | Performed by: EMERGENCY MEDICINE

## 2024-08-11 PROCEDURE — 94660 CPAP INITIATION&MGMT: CPT

## 2024-08-11 PROCEDURE — 85730 THROMBOPLASTIN TIME PARTIAL: CPT | Performed by: EMERGENCY MEDICINE

## 2024-08-11 PROCEDURE — 87040 BLOOD CULTURE FOR BACTERIA: CPT | Performed by: EMERGENCY MEDICINE

## 2024-08-11 PROCEDURE — 99223 1ST HOSP IP/OBS HIGH 75: CPT | Mod: AI | Performed by: INTERNAL MEDICINE

## 2024-08-11 PROCEDURE — 96365 THER/PROPH/DIAG IV INF INIT: CPT | Mod: 59

## 2024-08-11 PROCEDURE — 84484 ASSAY OF TROPONIN QUANT: CPT | Performed by: EMERGENCY MEDICINE

## 2024-08-11 PROCEDURE — 87637 SARSCOV2&INF A&B&RSV AMP PRB: CPT | Performed by: EMERGENCY MEDICINE

## 2024-08-11 PROCEDURE — 85025 COMPLETE CBC W/AUTO DIFF WBC: CPT | Performed by: EMERGENCY MEDICINE

## 2024-08-11 RX ORDER — AMOXICILLIN 250 MG
1 CAPSULE ORAL 2 TIMES DAILY PRN
Status: DISCONTINUED | OUTPATIENT
Start: 2024-08-11 | End: 2024-08-17 | Stop reason: HOSPADM

## 2024-08-11 RX ORDER — IOPAMIDOL 755 MG/ML
500 INJECTION, SOLUTION INTRAVASCULAR ONCE
Status: COMPLETED | OUTPATIENT
Start: 2024-08-11 | End: 2024-08-11

## 2024-08-11 RX ORDER — ACETAMINOPHEN 325 MG/1
650 TABLET ORAL EVERY 4 HOURS PRN
Status: DISCONTINUED | OUTPATIENT
Start: 2024-08-11 | End: 2024-08-17 | Stop reason: HOSPADM

## 2024-08-11 RX ORDER — CALCIUM CARBONATE 500 MG/1
1000 TABLET, CHEWABLE ORAL 4 TIMES DAILY PRN
Status: DISCONTINUED | OUTPATIENT
Start: 2024-08-11 | End: 2024-08-17 | Stop reason: HOSPADM

## 2024-08-11 RX ORDER — AMOXICILLIN 250 MG
2 CAPSULE ORAL 2 TIMES DAILY PRN
Status: DISCONTINUED | OUTPATIENT
Start: 2024-08-11 | End: 2024-08-17 | Stop reason: HOSPADM

## 2024-08-11 RX ORDER — METHYLPREDNISOLONE SODIUM SUCCINATE 125 MG/2ML
125 INJECTION, POWDER, LYOPHILIZED, FOR SOLUTION INTRAMUSCULAR; INTRAVENOUS ONCE
Status: COMPLETED | OUTPATIENT
Start: 2024-08-12 | End: 2024-08-12

## 2024-08-11 RX ORDER — CEFTRIAXONE 2 G/1
2 INJECTION, POWDER, FOR SOLUTION INTRAMUSCULAR; INTRAVENOUS ONCE
Status: COMPLETED | OUTPATIENT
Start: 2024-08-11 | End: 2024-08-11

## 2024-08-11 RX ORDER — LIDOCAINE 40 MG/G
CREAM TOPICAL
Status: DISCONTINUED | OUTPATIENT
Start: 2024-08-11 | End: 2024-08-13

## 2024-08-11 RX ORDER — ENOXAPARIN SODIUM 100 MG/ML
40 INJECTION SUBCUTANEOUS EVERY 24 HOURS
Status: DISCONTINUED | OUTPATIENT
Start: 2024-08-11 | End: 2024-08-17 | Stop reason: HOSPADM

## 2024-08-11 RX ORDER — AZITHROMYCIN 250 MG/1
250 TABLET, FILM COATED ORAL DAILY
Status: DISCONTINUED | OUTPATIENT
Start: 2024-08-12 | End: 2024-08-17 | Stop reason: HOSPADM

## 2024-08-11 RX ORDER — IPRATROPIUM BROMIDE AND ALBUTEROL SULFATE 2.5; .5 MG/3ML; MG/3ML
3 SOLUTION RESPIRATORY (INHALATION)
Status: DISCONTINUED | OUTPATIENT
Start: 2024-08-12 | End: 2024-08-12

## 2024-08-11 RX ORDER — ACETAMINOPHEN 650 MG/1
650 SUPPOSITORY RECTAL EVERY 4 HOURS PRN
Status: DISCONTINUED | OUTPATIENT
Start: 2024-08-11 | End: 2024-08-17 | Stop reason: HOSPADM

## 2024-08-11 RX ORDER — AZITHROMYCIN 500 MG/5ML
500 INJECTION, POWDER, LYOPHILIZED, FOR SOLUTION INTRAVENOUS ONCE
Status: COMPLETED | OUTPATIENT
Start: 2024-08-11 | End: 2024-08-11

## 2024-08-11 RX ORDER — ONDANSETRON 2 MG/ML
4 INJECTION INTRAMUSCULAR; INTRAVENOUS EVERY 6 HOURS PRN
Status: DISCONTINUED | OUTPATIENT
Start: 2024-08-11 | End: 2024-08-17 | Stop reason: HOSPADM

## 2024-08-11 RX ORDER — CEFTRIAXONE 2 G/1
2 INJECTION, POWDER, FOR SOLUTION INTRAMUSCULAR; INTRAVENOUS EVERY 24 HOURS
Status: DISCONTINUED | OUTPATIENT
Start: 2024-08-12 | End: 2024-08-17 | Stop reason: HOSPADM

## 2024-08-11 RX ORDER — SODIUM CHLORIDE 9 MG/ML
INJECTION, SOLUTION INTRAVENOUS CONTINUOUS
Status: DISCONTINUED | OUTPATIENT
Start: 2024-08-11 | End: 2024-08-12

## 2024-08-11 RX ORDER — ONDANSETRON 4 MG/1
4 TABLET, ORALLY DISINTEGRATING ORAL EVERY 6 HOURS PRN
Status: DISCONTINUED | OUTPATIENT
Start: 2024-08-11 | End: 2024-08-17 | Stop reason: HOSPADM

## 2024-08-11 RX ADMIN — SODIUM CHLORIDE 500 ML: 9 INJECTION, SOLUTION INTRAVENOUS at 21:36

## 2024-08-11 RX ADMIN — IOPAMIDOL 75 ML: 755 INJECTION, SOLUTION INTRAVENOUS at 22:55

## 2024-08-11 RX ADMIN — SODIUM CHLORIDE 83 ML: 9 INJECTION, SOLUTION INTRAVENOUS at 22:55

## 2024-08-11 RX ADMIN — AZITHROMYCIN MONOHYDRATE 500 MG: 500 INJECTION, POWDER, LYOPHILIZED, FOR SOLUTION INTRAVENOUS at 20:27

## 2024-08-11 RX ADMIN — CEFTRIAXONE 2 G: 2 INJECTION, POWDER, FOR SOLUTION INTRAMUSCULAR; INTRAVENOUS at 20:28

## 2024-08-11 ASSESSMENT — ACTIVITIES OF DAILY LIVING (ADL)
ADLS_ACUITY_SCORE: 37

## 2024-08-12 ENCOUNTER — APPOINTMENT (OUTPATIENT)
Dept: CARDIOLOGY | Facility: CLINIC | Age: 79
DRG: 871 | End: 2024-08-12
Attending: INTERNAL MEDICINE
Payer: MEDICARE

## 2024-08-12 ENCOUNTER — APPOINTMENT (OUTPATIENT)
Dept: GENERAL RADIOLOGY | Facility: CLINIC | Age: 79
DRG: 871 | End: 2024-08-12
Attending: INTERNAL MEDICINE
Payer: MEDICARE

## 2024-08-12 PROBLEM — Z66 DNR (DO NOT RESUSCITATE): Status: ACTIVE | Noted: 2024-08-12

## 2024-08-12 LAB
ALBUMIN SERPL BCG-MCNC: 3.9 G/DL (ref 3.5–5.2)
ALP SERPL-CCNC: 86 U/L (ref 40–150)
ALT SERPL W P-5'-P-CCNC: 14 U/L (ref 0–50)
ANION GAP SERPL CALCULATED.3IONS-SCNC: 15 MMOL/L (ref 7–15)
AST SERPL W P-5'-P-CCNC: 40 U/L (ref 0–45)
ATRIAL RATE - MUSE: 101 BPM
ATRIAL RATE - MUSE: 187 BPM
ATRIAL RATE - MUSE: 98 BPM
ATRIAL RATE - MUSE: NORMAL BPM
BASE EXCESS BLDV CALC-SCNC: -8.8 MMOL/L (ref -3–3)
BASOPHILS # BLD AUTO: 0 10E3/UL (ref 0–0.2)
BASOPHILS NFR BLD AUTO: 0 %
BI-PLANE LVEF ECHO: NORMAL
BILIRUB SERPL-MCNC: 0.3 MG/DL
BUN SERPL-MCNC: 16 MG/DL (ref 8–23)
CALCIUM SERPL-MCNC: 8.6 MG/DL (ref 8.8–10.4)
CHLORIDE SERPL-SCNC: 103 MMOL/L (ref 98–107)
CREAT SERPL-MCNC: 1.1 MG/DL (ref 0.51–0.95)
DIASTOLIC BLOOD PRESSURE - MUSE: NORMAL MMHG
EGFRCR SERPLBLD CKD-EPI 2021: 50 ML/MIN/1.73M2
EOSINOPHIL # BLD AUTO: 0 10E3/UL (ref 0–0.7)
EOSINOPHIL NFR BLD AUTO: 0 %
ERYTHROCYTE [DISTWIDTH] IN BLOOD BY AUTOMATED COUNT: 14.9 % (ref 10–15)
GLUCOSE BLDC GLUCOMTR-MCNC: 140 MG/DL (ref 70–99)
GLUCOSE SERPL-MCNC: 160 MG/DL (ref 70–99)
HBA1C MFR BLD: 6.4 %
HCO3 BLDV-SCNC: 19 MMOL/L (ref 21–28)
HCO3 SERPL-SCNC: 16 MMOL/L (ref 22–29)
HCT VFR BLD AUTO: 45.8 % (ref 35–47)
HGB BLD-MCNC: 14.7 G/DL (ref 11.7–15.7)
IMM GRANULOCYTES # BLD: 0.1 10E3/UL
IMM GRANULOCYTES NFR BLD: 1 %
INTERPRETATION ECG - MUSE: NORMAL
LACTATE SERPL-SCNC: 1.2 MMOL/L (ref 0.7–2)
LACTATE SERPL-SCNC: 1.4 MMOL/L (ref 0.7–2)
LACTATE SERPL-SCNC: 2.3 MMOL/L (ref 0.7–2)
LYMPHOCYTES # BLD AUTO: 0.9 10E3/UL (ref 0.8–5.3)
LYMPHOCYTES NFR BLD AUTO: 7 %
MCH RBC QN AUTO: 29.6 PG (ref 26.5–33)
MCHC RBC AUTO-ENTMCNC: 32.1 G/DL (ref 31.5–36.5)
MCV RBC AUTO: 92 FL (ref 78–100)
MONOCYTES # BLD AUTO: 0.2 10E3/UL (ref 0–1.3)
MONOCYTES NFR BLD AUTO: 1 %
NEUTROPHILS # BLD AUTO: 11.6 10E3/UL (ref 1.6–8.3)
NEUTROPHILS NFR BLD AUTO: 91 %
NRBC # BLD AUTO: 0 10E3/UL
NRBC BLD AUTO-RTO: 0 /100
O2/TOTAL GAS SETTING VFR VENT: 40 %
OXYHGB MFR BLDV: 97 % (ref 70–75)
P AXIS - MUSE: 71 DEGREES
P AXIS - MUSE: NORMAL DEGREES
PCO2 BLDV: 48 MM HG (ref 40–50)
PH BLDV: 7.21 [PH] (ref 7.32–7.43)
PLATELET # BLD AUTO: 149 10E3/UL (ref 150–450)
PO2 BLDV: 103 MM HG (ref 25–47)
POTASSIUM SERPL-SCNC: 4.3 MMOL/L (ref 3.4–5.3)
PR INTERVAL - MUSE: 174 MS
PR INTERVAL - MUSE: NORMAL MS
PROT SERPL-MCNC: 7.2 G/DL (ref 6.4–8.3)
QRS DURATION - MUSE: 122 MS
QRS DURATION - MUSE: 126 MS
QRS DURATION - MUSE: 134 MS
QRS DURATION - MUSE: 136 MS
QT - MUSE: 280 MS
QT - MUSE: 332 MS
QT - MUSE: 334 MS
QT - MUSE: 400 MS
QTC - MUSE: 441 MS
QTC - MUSE: 518 MS
QTC - MUSE: 526 MS
QTC - MUSE: 527 MS
R AXIS - MUSE: -3 DEGREES
R AXIS - MUSE: -48 DEGREES
R AXIS - MUSE: -7 DEGREES
R AXIS - MUSE: -71 DEGREES
RBC # BLD AUTO: 4.96 10E6/UL (ref 3.8–5.2)
SAO2 % BLDV: 97.9 % (ref 70–75)
SODIUM SERPL-SCNC: 134 MMOL/L (ref 135–145)
SYSTOLIC BLOOD PRESSURE - MUSE: NORMAL MMHG
T AXIS - MUSE: 44 DEGREES
T AXIS - MUSE: 75 DEGREES
T AXIS - MUSE: 76 DEGREES
T AXIS - MUSE: 89 DEGREES
VENTRICULAR RATE- MUSE: 101 BPM
VENTRICULAR RATE- MUSE: 149 BPM
VENTRICULAR RATE- MUSE: 150 BPM
VENTRICULAR RATE- MUSE: 151 BPM
WBC # BLD AUTO: 12.7 10E3/UL (ref 4–11)

## 2024-08-12 PROCEDURE — 36415 COLL VENOUS BLD VENIPUNCTURE: CPT | Performed by: INTERNAL MEDICINE

## 2024-08-12 PROCEDURE — 82610 CYSTATIN C: CPT | Performed by: INTERNAL MEDICINE

## 2024-08-12 PROCEDURE — 258N000003 HC RX IP 258 OP 636: Performed by: INTERNAL MEDICINE

## 2024-08-12 PROCEDURE — 250N000011 HC RX IP 250 OP 636: Performed by: INTERNAL MEDICINE

## 2024-08-12 PROCEDURE — 83605 ASSAY OF LACTIC ACID: CPT | Performed by: INTERNAL MEDICINE

## 2024-08-12 PROCEDURE — 250N000009 HC RX 250: Performed by: INTERNAL MEDICINE

## 2024-08-12 PROCEDURE — 83605 ASSAY OF LACTIC ACID: CPT

## 2024-08-12 PROCEDURE — 82805 BLOOD GASES W/O2 SATURATION: CPT | Performed by: INTERNAL MEDICINE

## 2024-08-12 PROCEDURE — 94640 AIRWAY INHALATION TREATMENT: CPT | Mod: 76

## 2024-08-12 PROCEDURE — 999N000156 HC STATISTIC RCP CONSULT EA 30 MIN

## 2024-08-12 PROCEDURE — 71045 X-RAY EXAM CHEST 1 VIEW: CPT

## 2024-08-12 PROCEDURE — 36415 COLL VENOUS BLD VENIPUNCTURE: CPT

## 2024-08-12 PROCEDURE — 999N000208 ECHOCARDIOGRAM COMPLETE

## 2024-08-12 PROCEDURE — 93306 TTE W/DOPPLER COMPLETE: CPT | Mod: 26 | Performed by: INTERNAL MEDICINE

## 2024-08-12 PROCEDURE — 82040 ASSAY OF SERUM ALBUMIN: CPT | Performed by: INTERNAL MEDICINE

## 2024-08-12 PROCEDURE — 255N000002 HC RX 255 OP 636: Performed by: INTERNAL MEDICINE

## 2024-08-12 PROCEDURE — 94660 CPAP INITIATION&MGMT: CPT

## 2024-08-12 PROCEDURE — 250N000013 HC RX MED GY IP 250 OP 250 PS 637: Performed by: INTERNAL MEDICINE

## 2024-08-12 PROCEDURE — C8929 TTE W OR WO FOL WCON,DOPPLER: HCPCS

## 2024-08-12 PROCEDURE — 84155 ASSAY OF PROTEIN SERUM: CPT | Performed by: INTERNAL MEDICINE

## 2024-08-12 PROCEDURE — 120N000013 HC R&B IMCU

## 2024-08-12 PROCEDURE — 99233 SBSQ HOSP IP/OBS HIGH 50: CPT | Performed by: INTERNAL MEDICINE

## 2024-08-12 PROCEDURE — 85025 COMPLETE CBC W/AUTO DIFF WBC: CPT | Performed by: INTERNAL MEDICINE

## 2024-08-12 PROCEDURE — 94640 AIRWAY INHALATION TREATMENT: CPT

## 2024-08-12 PROCEDURE — 999N000157 HC STATISTIC RCP TIME EA 10 MIN

## 2024-08-12 RX ORDER — TORSEMIDE 10 MG/1
10 TABLET ORAL DAILY
Status: DISCONTINUED | OUTPATIENT
Start: 2024-08-13 | End: 2024-08-13

## 2024-08-12 RX ORDER — METOPROLOL SUCCINATE 25 MG/1
25 TABLET, EXTENDED RELEASE ORAL DAILY
Status: DISCONTINUED | OUTPATIENT
Start: 2024-08-12 | End: 2024-08-14

## 2024-08-12 RX ORDER — DEXTROSE MONOHYDRATE 25 G/50ML
25-50 INJECTION, SOLUTION INTRAVENOUS
Status: DISCONTINUED | OUTPATIENT
Start: 2024-08-12 | End: 2024-08-13

## 2024-08-12 RX ORDER — NICOTINE POLACRILEX 4 MG
15-30 LOZENGE BUCCAL
Status: DISCONTINUED | OUTPATIENT
Start: 2024-08-12 | End: 2024-08-17 | Stop reason: HOSPADM

## 2024-08-12 RX ORDER — METHYLPREDNISOLONE SODIUM SUCCINATE 40 MG/ML
40 INJECTION, POWDER, LYOPHILIZED, FOR SOLUTION INTRAMUSCULAR; INTRAVENOUS EVERY 8 HOURS
Status: DISCONTINUED | OUTPATIENT
Start: 2024-08-12 | End: 2024-08-13

## 2024-08-12 RX ORDER — LORAZEPAM 2 MG/ML
INJECTION INTRAMUSCULAR
Status: COMPLETED
Start: 2024-08-12 | End: 2024-08-12

## 2024-08-12 RX ORDER — LORAZEPAM 2 MG/ML
0.5 INJECTION INTRAMUSCULAR ONCE
Status: COMPLETED | OUTPATIENT
Start: 2024-08-13 | End: 2024-08-12

## 2024-08-12 RX ORDER — NICOTINE POLACRILEX 4 MG
15-30 LOZENGE BUCCAL
Status: DISCONTINUED | OUTPATIENT
Start: 2024-08-12 | End: 2024-08-13

## 2024-08-12 RX ORDER — IPRATROPIUM BROMIDE AND ALBUTEROL SULFATE 2.5; .5 MG/3ML; MG/3ML
3 SOLUTION RESPIRATORY (INHALATION) EVERY 4 HOURS PRN
Status: DISCONTINUED | OUTPATIENT
Start: 2024-08-12 | End: 2024-08-12

## 2024-08-12 RX ORDER — ASPIRIN 81 MG/1
81 TABLET ORAL DAILY
Status: DISCONTINUED | OUTPATIENT
Start: 2024-08-12 | End: 2024-08-17 | Stop reason: HOSPADM

## 2024-08-12 RX ORDER — AMLODIPINE BESYLATE 5 MG/1
5 TABLET ORAL DAILY
Status: DISCONTINUED | OUTPATIENT
Start: 2024-08-12 | End: 2024-08-14

## 2024-08-12 RX ORDER — FUROSEMIDE 10 MG/ML
40 INJECTION INTRAMUSCULAR; INTRAVENOUS ONCE
Status: COMPLETED | OUTPATIENT
Start: 2024-08-13 | End: 2024-08-13

## 2024-08-12 RX ORDER — ALBUTEROL SULFATE 0.83 MG/ML
2.5 SOLUTION RESPIRATORY (INHALATION)
Status: DISCONTINUED | OUTPATIENT
Start: 2024-08-12 | End: 2024-08-17 | Stop reason: HOSPADM

## 2024-08-12 RX ORDER — HYDROXYZINE HYDROCHLORIDE 10 MG/1
10 TABLET, FILM COATED ORAL 3 TIMES DAILY PRN
Status: DISCONTINUED | OUTPATIENT
Start: 2024-08-12 | End: 2024-08-17 | Stop reason: HOSPADM

## 2024-08-12 RX ORDER — IPRATROPIUM BROMIDE AND ALBUTEROL SULFATE 2.5; .5 MG/3ML; MG/3ML
3 SOLUTION RESPIRATORY (INHALATION)
Status: DISCONTINUED | OUTPATIENT
Start: 2024-08-13 | End: 2024-08-13

## 2024-08-12 RX ORDER — IPRATROPIUM BROMIDE AND ALBUTEROL SULFATE 2.5; .5 MG/3ML; MG/3ML
3 SOLUTION RESPIRATORY (INHALATION)
Status: DISCONTINUED | OUTPATIENT
Start: 2024-08-13 | End: 2024-08-12

## 2024-08-12 RX ORDER — PREDNISONE 20 MG/1
40 TABLET ORAL DAILY
Status: DISCONTINUED | OUTPATIENT
Start: 2024-08-13 | End: 2024-08-12

## 2024-08-12 RX ORDER — DEXTROSE MONOHYDRATE 25 G/50ML
25-50 INJECTION, SOLUTION INTRAVENOUS
Status: DISCONTINUED | OUTPATIENT
Start: 2024-08-12 | End: 2024-08-17 | Stop reason: HOSPADM

## 2024-08-12 RX ORDER — HYDRALAZINE HYDROCHLORIDE 20 MG/ML
10 INJECTION INTRAMUSCULAR; INTRAVENOUS EVERY 4 HOURS PRN
Status: DISCONTINUED | OUTPATIENT
Start: 2024-08-12 | End: 2024-08-17 | Stop reason: HOSPADM

## 2024-08-12 RX ADMIN — ALBUTEROL SULFATE 2.5 MG: 2.5 SOLUTION RESPIRATORY (INHALATION) at 22:25

## 2024-08-12 RX ADMIN — IPRATROPIUM BROMIDE AND ALBUTEROL SULFATE 3 ML: .5; 3 SOLUTION RESPIRATORY (INHALATION) at 19:13

## 2024-08-12 RX ADMIN — ASPIRIN 81 MG: 81 TABLET, COATED ORAL at 18:29

## 2024-08-12 RX ADMIN — LORAZEPAM 0.5 MG: 2 INJECTION INTRAMUSCULAR; INTRAVENOUS at 23:38

## 2024-08-12 RX ADMIN — CEFTRIAXONE 2 G: 2 INJECTION, POWDER, FOR SOLUTION INTRAMUSCULAR; INTRAVENOUS at 20:00

## 2024-08-12 RX ADMIN — HYDRALAZINE HYDROCHLORIDE 10 MG: 20 INJECTION INTRAMUSCULAR; INTRAVENOUS at 22:54

## 2024-08-12 RX ADMIN — IPRATROPIUM BROMIDE AND ALBUTEROL SULFATE 3 ML: .5; 3 SOLUTION RESPIRATORY (INHALATION) at 11:39

## 2024-08-12 RX ADMIN — METHYLPREDNISOLONE SODIUM SUCCINATE 40 MG: 40 INJECTION, POWDER, FOR SOLUTION INTRAMUSCULAR; INTRAVENOUS at 23:38

## 2024-08-12 RX ADMIN — AMLODIPINE BESYLATE 5 MG: 5 TABLET ORAL at 18:27

## 2024-08-12 RX ADMIN — HUMAN ALBUMIN MICROSPHERES AND PERFLUTREN 2 ML: 10; .22 INJECTION, SOLUTION INTRAVENOUS at 07:54

## 2024-08-12 RX ADMIN — SODIUM CHLORIDE: 9 INJECTION, SOLUTION INTRAVENOUS at 01:15

## 2024-08-12 RX ADMIN — AZITHROMYCIN DIHYDRATE 250 MG: 250 TABLET ORAL at 09:12

## 2024-08-12 RX ADMIN — METOPROLOL SUCCINATE 25 MG: 25 TABLET, EXTENDED RELEASE ORAL at 18:29

## 2024-08-12 RX ADMIN — SODIUM CHLORIDE: 9 INJECTION, SOLUTION INTRAVENOUS at 11:20

## 2024-08-12 RX ADMIN — ENOXAPARIN SODIUM 40 MG: 40 INJECTION SUBCUTANEOUS at 09:12

## 2024-08-12 RX ADMIN — METHYLPREDNISOLONE SODIUM SUCCINATE 125 MG: 125 INJECTION, POWDER, FOR SOLUTION INTRAMUSCULAR; INTRAVENOUS at 01:16

## 2024-08-12 ASSESSMENT — ACTIVITIES OF DAILY LIVING (ADL)
ADLS_ACUITY_SCORE: 37
ADLS_ACUITY_SCORE: 48
ADLS_ACUITY_SCORE: 48
ADLS_ACUITY_SCORE: 43
ADLS_ACUITY_SCORE: 37
ADLS_ACUITY_SCORE: 37
ADLS_ACUITY_SCORE: 48
ADLS_ACUITY_SCORE: 43
ADLS_ACUITY_SCORE: 48
ADLS_ACUITY_SCORE: 44
ADLS_ACUITY_SCORE: 37
ADLS_ACUITY_SCORE: 43
ADLS_ACUITY_SCORE: 43
ADLS_ACUITY_SCORE: 48
ADLS_ACUITY_SCORE: 43

## 2024-08-12 NOTE — PROGRESS NOTES
"[facility]    Medicine Progress Note - Hospitalist Service    Date of Admission:  8/11/2024    Assessment & Plan   Allyson Samuel is a 79 year old female who came to attention on  on 8/11/2024 due to \"feeling sick\" and SOB for several days PTA.  She was found to be in acute hypoxic resp failure and was admitted with severe sepsis and acute hypoxic and hypercapnic resp failure due to pneumonia.    Significant PMH includes tobacco abuse without a formal dx of COPD, CAD (hx MI and stenting in 2003), HTN, Macular degeneration.  She underwent nephrectomy in 2015 for urothelial cancer. The pt apparently does not follow regularly with medical care since about 2019.    Imaging: left lower lobe infiltrate with air bronchograms consistent with pneumonia. Otherwise, pt with evident emphysematous changes throughout both lungs.      DX:  Acute hypoxic and hypercapnic resp failure in the setting of evident emphysematous changes in both lungs and evident left lower lobe infiltrate.  Initialy VBG showed pH 7.11 with pCO2 62.    Severe sepsis with lactic acidosis, leukocytosis, mild CYNTHIA.   Elevated BNP, EF measured at 48%.    Elevated troponin thought to be due to strain in the setting of acute infection.   Mild CYNTHIA with electrolyte disturbances. Hyponatremia, low bicarbonate.   HTN.    DM II.       PLAN:  1.  Cont with empiric Ceftriaxone and Azithromycin. Empirically start prednisone 40 g qd.   2.  Pulmonary toilet. May need to start mucomyst nebs.   3.  Wean as able.  4.  Start antihypertensives. (Resumed metoprolol XL 25 mg daily; start amlodipine) Should also start lisinopril before discharge.   5.  Given pt's report of SANCHEZ/chest discomfort, will consider stress testing. She would likely not be a good candidate for anything beyond medical therapy, but it would be worthwhile to know if she has symptomatic CAD.   6.  Empirically will start low dose diuretic.           Diet: Combination Diet Regular Diet Adult  " "  DVT Prophylaxis: Enoxaparin (Lovenox) SQ  Prado Catheter: Not present  Lines: None     Cardiac Monitoring: None  Code Status: No CPR- Do NOT Intubate      Clinically Significant Risk Factors Present on Admission                # Drug Induced Platelet Defect: home medication list includes an antiplatelet medication   # Hypertension: Noted on problem list         # Obesity: Estimated body mass index is 36.37 kg/m  as calculated from the following:    Height as of this encounter: 1.575 m (5' 2\").    Weight as of this encounter: 90.2 kg (198 lb 13.7 oz).                    Disposition Plan     Medically Ready for Discharge: Anticipated in 2-4 Days             Nba Mello MD  Hospitalist Service  [facility]  Securely message with [employer] (more info)  Text page via [employer] Paging/Directory   ______________________________________________________________________    Interval History   Chart reviewed, pt interviewed.    Pt weaned form BiPAP overnight to 4 LPM oxygen by NC.     Daughter at the bedside endorses pt's history. Ms. Samuel lives with daughter and is typically fairly active.     Physical Exam   Vital Signs: Temp: 97.6  F (36.4  C) Temp src: Oral BP: (!) 142/64 Pulse: 88   Resp: 16 SpO2: 93 % O2 Device: Nasal cannula Oxygen Delivery: 4 LPM  Weight: 198 lbs 13.68 oz    General Appearance: Alert and comfortable breathing 4 LPM by NC.  Respiratory: distant breath sounds are coarse  Cardiovascular: RRR without murmur  GI: soft, obese  Skin: no lesions. Trace edema.  Other:      Medical Decision Making       50 MINUTES SPENT BY ME on the date of service doing chart review, history, exam, documentation & further activities per the note.      Data   Results for orders placed or performed during the hospital encounter of 08/11/24 (from the past 24 hour(s))   EKG 12 lead   Result Value Ref Range    Systolic Blood Pressure  mmHg    Diastolic Blood Pressure  mmHg    Ventricular Rate 150 BPM    Atrial " Rate 98 BPM    IN Interval  ms    QRS Duration 126 ms     ms    QTc 527 ms    P Axis  degrees    R AXIS -3 degrees    T Axis 76 degrees    Interpretation ECG       Wide QRS tachycardia  Non-specific intra-ventricular conduction block  Cannot rule out Anteroseptal infarct , age undetermined  Abnormal ECG  When compared with ECG of 30-Jun-2014 06:41,  Wide QRS tachycardia has replaced Electronic atrial pacemaker  Vent. rate has increased by  78 bpm  Unconfirmed report - interpretation of this ECG is computer generated - see medical record for final interpretation  Confirmed by - EMERGENCY ROOM, PHYSICIAN ([id number]),  Monty Rivera ([id number]) on 8/12/2024 8:09:33 AM     EKG 12-lead, tracing only   Result Value Ref Range    Systolic Blood Pressure  mmHg    Diastolic Blood Pressure  mmHg    Ventricular Rate 151 BPM    Atrial Rate 187 BPM    IN Interval  ms    QRS Duration 122 ms     ms    QTc 526 ms    P Axis  degrees    R AXIS -7 degrees    T Axis 44 degrees    Interpretation ECG       Wide QRS tachycardia  Anteroseptal infarct , age undetermined  Abnormal ECG  When compared with ECG of 30-Jun-2014 06:41,  Wide QRS tachycardia has replaced Electronic atrial pacemaker  Vent. rate has increased by  79 bpm  Unconfirmed report - interpretation of this ECG is computer generated - see medical record for final interpretation  Confirmed by - EMERGENCY ROOM, PHYSICIAN ([id number]),  Monty Rivera ([id number]) on 8/12/2024 8:09:41 AM     [place] Draw    Narrative    The following orders were created for panel order [place] Draw.  Procedure                               Abnormality         Status                     ---------                               -----------         ------                     Extra Blue Top Tube[[id number]]                              Final result               Extra Red Top Tube[[id number]]                               Final result               Extra Green Top (Lithium...[[id number]]                       Final result               Extra Purple Top Tube[[id number]]                            Final result                 Please view results for these tests on the individual orders.   Extra Blue Top Tube   Result Value Ref Range    Hold Specimen JIC    Extra Red Top Tube   Result Value Ref Range    Hold Specimen JIC    Extra Green Top (Lithium Heparin) Tube   Result Value Ref Range    Hold Specimen JIC    Extra Purple Top Tube   Result Value Ref Range    Hold Specimen JIC    CBC with platelets differential    Narrative    The following orders were created for panel order CBC with platelets differential.  Procedure                               Abnormality         Status                     ---------                               -----------         ------                     CBC with platelets and d...[[id number]]  Abnormal            Final result                 Please view results for these tests on the individual orders.   Comprehensive metabolic panel   Result Value Ref Range    Sodium 132 (L) 135 - 145 mmol/L    Potassium 4.3 3.4 - 5.3 mmol/L    Carbon Dioxide (CO2) 17 (L) 22 - 29 mmol/L    Anion Gap 18 (H) 7 - 15 mmol/L    Urea Nitrogen 12.5 8.0 - 23.0 mg/dL    Creatinine 1.16 (H) 0.51 - 0.95 mg/dL    GFR Estimate 47 (L) >60 mL/min/1.73m2    Calcium 9.2 8.8 - 10.4 mg/dL    Chloride 97 (L) 98 - 107 mmol/L    Glucose 241 (H) 70 - 99 mg/dL    Alkaline Phosphatase 101 40 - 150 U/L    AST 25 0 - 45 U/L    ALT 15 0 - 50 U/L    Protein Total 8.0 6.4 - 8.3 g/dL    Albumin 4.4 3.5 - 5.2 g/dL    Bilirubin Total 0.5 <=1.2 mg/dL   INR   Result Value Ref Range    INR 1.02 0.85 - 1.15   Magnesium   Result Value Ref Range    Magnesium 2.0 1.7 - 2.3 mg/dL   Partial thromboplastin time   Result Value Ref Range    aPTT 26 22 - 38 Seconds   Procalcitonin   Result Value Ref Range    Procalcitonin 0.08 <0.50 ng/mL   Troponin T, High Sensitivity   Result Value Ref Range    Troponin T, High Sensitivity 57 (H) <=14 ng/L   TSH with  free T4 reflex   Result Value Ref Range    TSH 1.10 0.30 - 4.20 uIU/mL   Nt probnp inpatient   Result Value Ref Range    N terminal Pro BNP Inpatient 3,103 (H) 0 - 1,800 pg/mL   CBC with platelets and differential   Result Value Ref Range    WBC Count 16.2 (H) 4.0 - 11.0 10e3/uL    RBC Count 5.38 (H) 3.80 - 5.20 10e6/uL    Hemoglobin 16.2 (H) 11.7 - 15.7 g/dL    Hematocrit 50.4 (H) 35.0 - 47.0 %    MCV 94 78 - 100 fL    MCH 30.1 26.5 - 33.0 pg    MCHC 32.1 31.5 - 36.5 g/dL    RDW 15.0 10.0 - 15.0 %    Platelet Count 191 150 - 450 10e3/uL    % Neutrophils 66 %    % Lymphocytes 24 %    % Monocytes 9 %    % Eosinophils 0 %    % Basophils 1 %    % Immature Granulocytes 1 %    NRBCs per 100 WBC 0 <1 /100    Absolute Neutrophils 10.6 (H) 1.6 - 8.3 10e3/uL    Absolute Lymphocytes 3.9 0.8 - 5.3 10e3/uL    Absolute Monocytes 1.4 (H) 0.0 - 1.3 10e3/uL    Absolute Eosinophils 0.0 0.0 - 0.7 10e3/uL    Absolute Basophils 0.1 0.0 - 0.2 10e3/uL    Absolute Immature Granulocytes 0.1 <=0.4 10e3/uL    Absolute NRBCs 0.0 10e3/uL   Phosphorus   Result Value Ref Range    Phosphorus 4.3 2.5 - 4.5 mg/dL   Hemoglobin A1c   Result Value Ref Range    Hemoglobin A1C 6.4 (H) <5.7 %   iStat Gases (lactate) venous, POCT   Result Value Ref Range    Lactic Acid POCT 3.8 (H) <=2.0 mmol/L    Bicarbonate Venous POCT 20 (L) 21 - 28 mmol/L    O2 Sat, Venous POCT 80 (H) 70 - 75 %    pCO2 Venous POCT 62 (H) 40 - 50 mm Hg    pH Venous POCT 7.11 (LL) 7.32 - 7.43    pO2 Venous POCT 60 (H) 25 - 47 mm Hg   Symptomatic Influenza A/B, RSV, & SARS-CoV2 PCR (COVID-19) Nasopharyngeal    Specimen: Nasopharyngeal; Swab   Result Value Ref Range    Influenza A PCR Negative Negative    Influenza B PCR Negative Negative    RSV PCR Negative Negative    SARS CoV2 PCR Negative Negative    Narrative    Testing was performed using the Xpert Xpress CoV2/Flu/RSV Assay on the [employer] Instrument. This test should be ordered for the detection of SARS-CoV-2, influenza,  and RSV viruses in individuals who meet clinical and/or epidemiological criteria. Test performance is unknown in asymptomatic patients. This test is for in vitro diagnostic use under the FDA EUA for laboratories certified under CLIA to perform high or moderate complexity testing. This test has not been FDA cleared or approved. A negative result does not rule out the presence of PCR inhibitors in the specimen or target RNA in concentration below the limit of detection for the assay. If only one viral target is positive but coinfection with multiple targets is suspected, the sample should be re-tested with another FDA cleared, approved, or authorized test, if coinfection would change clinical management. This test was validated by the [facility]. These laboratories are certified under the Clinical Laboratory Improvement Amendments of 1988 (CLIA-88) as qualified to perform high complexity laboratory testing.   ABO/Rh type and screen    Narrative    The following orders were created for panel order ABO/Rh type and screen.  Procedure                               Abnormality         Status                     ---------                               -----------         ------                     Adult Type and Screen[[id number]]                            Final result                 Please view results for these tests on the individual orders.   Lactic acid whole blood with 1x repeat in 2 hr when >2   Result Value Ref Range    Lactic Acid, Initial 4.2 (HH) 0.7 - 2.0 mmol/L   Blood Culture Arm, Right    Specimen: Arm, Right; Blood   Result Value Ref Range    Culture No growth after 12 hours    Adult Type and Screen   Result Value Ref Range    ABO/RH(D) A POS     Antibody Screen Negative Negative    SPECIMEN EXPIRATION DATE [id number]    XR Chest Port 1 View    Narrative    EXAM: XR CHEST PORT 1 VIEW  LOCATION: [facility]  DATE: 8/11/2024    INDICATION: Respiratory  distress.  COMPARISON: 4/14/2015      Impression    IMPRESSION:     Diffuse interstitial prominence with hazy right mid to lower lung and left lower lung opacities, either representing mild interstitial and alveolar pulmonary edema versus multifocal atypical infection.    No pleural effusion or pneumothorax.    The cardiac and mediastinal silhouette is unremarkable. Aortic calcifications.   EKG 12-lead, tracing only   Result Value Ref Range    Systolic Blood Pressure  mmHg    Diastolic Blood Pressure  mmHg    Ventricular Rate 149 BPM    Atrial Rate  BPM    NE Interval  ms    QRS Duration 134 ms     ms    QTc 441 ms    P Axis  degrees    R AXIS -48 degrees    T Axis 75 degrees    Interpretation ECG       Wide QRS tachycardia with Premature ventricular complexes or Fusion complexes  Left axis deviation  Non-specific intra-ventricular conduction block  Minimal voltage criteria for LVH, may be normal variant ( Cam product )  Cannot rule out Anteroseptal infarct , age undetermined  Abnormal ECG  When compared with ECG of 11-Aug-2024 20:01, (unconfirmed)  Fusion complexes are now Present  Premature ventricular complexes are now Present  Unconfirmed report - interpretation of this ECG is computer generated - see medical record for final interpretation  Confirmed by - EMERGENCY ROOM, PHYSICIAN ([id number]),  Monty Rivera ([id number]) on 8/12/2024 8:09:52 AM     iStat Gases (lactate) venous, POCT   Result Value Ref Range    Lactic Acid POCT 2.0 <=2.0 mmol/L    Bicarbonate Venous POCT 20 (L) 21 - 28 mmol/L    O2 Sat, Venous POCT 76 (H) 70 - 75 %    pCO2 Venous POCT 47 40 - 50 mm Hg    pH Venous POCT 7.23 (L) 7.32 - 7.43    pO2 Venous POCT 48 (H) 25 - 47 mm Hg   EKG 12-lead, tracing only   Result Value Ref Range    Systolic Blood Pressure  mmHg    Diastolic Blood Pressure  mmHg    Ventricular Rate 101 BPM    Atrial Rate 101 BPM    NE Interval 174 ms    QRS Duration 136 ms     ms    QTc 518 ms    P Axis 71 degrees     R AXIS -71 degrees    T Axis 89 degrees    Interpretation ECG       Sinus tachycardia  Left axis deviation  Non-specific intra-ventricular conduction block  Minimal voltage criteria for LVH, may be normal variant ( Cam product )  Cannot rule out Anteroseptal infarct , age undetermined  Abnormal ECG  When compared with ECG of 11-Aug-2024 20:15, (unconfirmed)  Sinus rhythm has replaced Wide QRS tachycardia  Unconfirmed report - interpretation of this ECG is computer generated - see medical record for final interpretation  Confirmed by - EMERGENCY ROOM, PHYSICIAN ([id number]),  Monty Rivera ([id number]) on 8/12/2024 8:10:10 AM     CT Chest Pulmonary Embolism w Contrast    Narrative    EXAM: CT CHEST PULMONARY EMBOLISM W CONTRAST  LOCATION: [facility]  DATE: 8/11/2024    INDICATION: Hypoxic respiratory failure. Evaluate for PE.  COMPARISON: Portable chest single view 8/11/2024 at 22 6 hours.  TECHNIQUE: CT chest pulmonary angiogram during arterial phase injection of IV contrast. Multiplanar reformats and MIP reconstructions were performed. Dose reduction techniques were used.   CONTRAST: 75 mL Isovue 370.    FINDINGS:  ANGIOGRAM CHEST: No pulmonary emboli on either side. Atherosclerotic normal caliber thoracic aorta without aneurysm or dissection. No CT evidence of right heart strain pattern.    LUNGS AND PLEURA: Moderate emphysematous changes. Diffuse thickening of the bronchi. Slight airspace infiltrate in the left upper lobe, likely an infectious or an inflammatory process. Platelike atelectasis in the lower lungs, more apparent in the left   lower lobe. No pleural effusion on either side.    MEDIASTINUM/AXILLAE: Cardiac size approaches upper limits of normal. Dense coronary artery calcifications. No pericardial effusion. Calcified subcarinal nodes indicative of remote granulomatous disease. No suspicious adenopathy. Trachea is midline.    CORONARY ARTERY CALCIFICATION: Severe.    UPPER  ABDOMEN: Bilateral adrenal hypodense benign adenomas. Atherosclerotic changes.    MUSCULOSKELETAL: Degenerative changes both shoulders and the thoracic spine.      Impression    IMPRESSION:  1.  No pulmonary emboli on either side. Moderate emphysematous changes. Diffuse thickening of the bronchi. Left upper lobe airspace infiltrate, likely representing an infectious or an inflammatory process. Bibasilar platelike atelectasis, more apparent   in the left lower lobe. No suspicious adenopathy or pleural effusion on either side. Evidence of remote granulomatous disease.    2.  Atherosclerotic thoracic aorta without aneurysm or dissection. Cardiac size approaches upper limits of normal. Dense coronary artery calcifications. No pericardial effusion.    3.  Bilateral adrenal hypodense benign adenomas.   Lactic acid whole blood   Result Value Ref Range    Lactic Acid 1.2 0.7 - 2.0 mmol/L   Echocardiogram Complete   Result Value Ref Range    Biplane LVEF 48%     Narrative    [id number]  [id number]  [id number]  [id number]^LIBAN^FRANCIS     [facility]  Echocardiography Laboratory  [address] [place] [address]     Name: SELMA WELLER  MRN: [id number]  : 1945  Study Date: 2024 07:10 AM  Age: 79 yrs  Gender: Female  Patient Location: [facility]  Reason For Study: CHF  Ordering Physician: FRANCIS LOUIE  Performed By: Milena Vazquez RDCS     BSA: 1.9 m2  Height: 62 in  Weight: 198 lb  HR: 91  BP: 148/74 mmHg  ______________________________________________________________________________  Procedure  Complete Portable Echo Adult. Optison (NDC #2356-0604) given intravenously.  ______________________________________________________________________________  Interpretation Summary     1. The left ventricle is normal in size. Biplane LVEF is 48%. There is mild  global hypokinesia of the left ventricle.  2. The right ventricle is normal in structure, function and size.  3. No valve disease.     Echo in   showed EF 60%.  ______________________________________________________________________________  Left Ventricle  The left ventricle is normal in size. There is normal left ventricular wall  thickness. Biplane LVEF is 48%. Left ventricular diastolic function is  indeterminate. There is mild global hypokinesia of the left ventricle.     Right Ventricle  The right ventricle is normal in structure, function and size.     Atria  Normal left atrial size. Right atrial size is normal. There is no atrial shunt  seen.     Mitral Valve  There is mild (1+) mitral regurgitation.     Tricuspid Valve  There is trace to mild tricuspid regurgitation.     Aortic Valve  The aortic valve is normal in structure and function.     Pulmonic Valve  The pulmonic valve is normal in structure and function.     Vessels  Normal ascending, transverse (arch), and descending aorta. Dilation of the  inferior vena cava is present with normal respiratory variation in diameter.     Pericardium  There is no pericardial effusion.     Rhythm  Sinus rhythm was noted.  ______________________________________________________________________________  MMode/2D Measurements & Calculations     IVSd: 1.3 cm  LVIDd: 4.8 cm  LVIDs: 4.2 cm  LVPWd: 1.1 cm  IVC diam: 2.3 cm  FS: 11.8 %  LV mass(C)d: 215.0 grams  LV mass(C)dI: 113.0 grams/m2  Ao root diam: 3.3 cm  asc Aorta Diam: 3.6 cm  Ao root diam index Ht(cm/m): 2.1  Ao root diam index BSA (cm/m2): 1.7  Asc Ao diam index BSA (cm/m2): 1.9  Asc Ao diam index Ht(cm/m): 2.3  EF Biplane: 48.9 %  LA Volume (BP): 60.4 ml     LA Volume Index (BP): 31.8 ml/m2  RV Base: 3.0 cm  RWT: 0.45  TAPSE: 1.9 cm     Doppler Measurements & Calculations  MV E max janes: 92.9 cm/sec  MV A max janes: 94.8 cm/sec  MV E/A: 0.98  MV max P.2 mmHg  MV mean PG: 3.0 mmHg  MV V2 VTI: 25.2 cm  MV dec time: 0.14 sec  PA acc time: 0.05 sec  TR max janes: 268.1 cm/sec  TR max P.8 mmHg  E/E' avg: 10.0  Lateral E/e': 7.9  Medial E/e': 12.0     RV S  [place]: 15.4 cm/sec     ______________________________________________________________________________  Report approved by: Gem Henriquez 08/12/2024 09:22 AM         CBC with platelets differential    Narrative    The following orders were created for panel order CBC with platelets differential.  Procedure                               Abnormality         Status                     ---------                               -----------         ------                     CBC with platelets and d...[[id number]]  Abnormal            Final result                 Please view results for these tests on the individual orders.   Comprehensive metabolic panel   Result Value Ref Range    Sodium 134 (L) 135 - 145 mmol/L    Potassium 4.3 3.4 - 5.3 mmol/L    Carbon Dioxide (CO2) 16 (L) 22 - 29 mmol/L    Anion Gap 15 7 - 15 mmol/L    Urea Nitrogen 16.0 8.0 - 23.0 mg/dL    Creatinine 1.10 (H) 0.51 - 0.95 mg/dL    GFR Estimate 50 (L) >60 mL/min/1.73m2    Calcium 8.6 (L) 8.8 - 10.4 mg/dL    Chloride 103 98 - 107 mmol/L    Glucose 160 (H) 70 - 99 mg/dL    Alkaline Phosphatase 86 40 - 150 U/L    AST 40 0 - 45 U/L    ALT 14 0 - 50 U/L    Protein Total 7.2 6.4 - 8.3 g/dL    Albumin 3.9 3.5 - 5.2 g/dL    Bilirubin Total 0.3 <=1.2 mg/dL   CBC with platelets and differential   Result Value Ref Range    WBC Count 12.7 (H) 4.0 - 11.0 10e3/uL    RBC Count 4.96 3.80 - 5.20 10e6/uL    Hemoglobin 14.7 11.7 - 15.7 g/dL    Hematocrit 45.8 35.0 - 47.0 %    MCV 92 78 - 100 fL    MCH 29.6 26.5 - 33.0 pg    MCHC 32.1 31.5 - 36.5 g/dL    RDW 14.9 10.0 - 15.0 %    Platelet Count 149 (L) 150 - 450 10e3/uL    % Neutrophils 91 %    % Lymphocytes 7 %    % Monocytes 1 %    % Eosinophils 0 %    % Basophils 0 %    % Immature Granulocytes 1 %    NRBCs per 100 WBC 0 <1 /100    Absolute Neutrophils 11.6 (H) 1.6 - 8.3 10e3/uL    Absolute Lymphocytes 0.9 0.8 - 5.3 10e3/uL    Absolute Monocytes 0.2 0.0 - 1.3 10e3/uL    Absolute Eosinophils 0.0 0.0 - 0.7 10e3/uL     Absolute Basophils 0.0 0.0 - 0.2 10e3/uL    Absolute Immature Granulocytes 0.1 <=0.4 10e3/uL    Absolute NRBCs 0.0 10e3/uL

## 2024-08-12 NOTE — PLAN OF CARE
[facility]    ED Boarding Nurse Handoff Addendum Report:    Date/time: 8/12/2024, 6:33 AM    Activity Level: standby    Fall Risk: Yes:  nonskid shoes/slippers when out of bed, arm band in place, and patient and family education    Active Infusions: NS @ 100mL/hr    Current Meds Due: n/a    Current care needs: Q2VS, monitor resp status    Oxygen requirements (liters/min and/or FiO2): currently on NC @ 4L, BIPAP @ 40%    Respiratory status: Nasal cannula, BIPAP    Vital signs (within last 30 minutes):    Vitals:    08/12/24 0340 08/12/24 0405 08/12/24 0600 08/12/24 0629   BP:  (!) 152/89 (!) 148/74    Pulse: 74 77 81 79   Resp:       Temp:  97.8  F (36.6  C)     TempSrc:  Oral     SpO2: 97% 97% 96% 98%   Weight:       Height:           Focused assessment within last 30 minutes:    Patient alert and oriented x4, LS coarse, dyspnea on excursion. Congested cough. PT switched to 4L NC this AM. Pt reports no SOB. SBA with ambulation. VSS except hypertensive.     ED Boarding Nurse name: Patricio Alston RN       RECEIVING UNIT ED HANDOFF REVIEW    Above ED Nurse Handoff Report was reviewed: Yes  Reviewed by: Claire Davidson RN on August 12, 2024 at 3:23 PM   JOHN Hicks called the ED to inform them the note was read: Yes

## 2024-08-12 NOTE — ED NOTES
"[facility]  ED Nurse Handoff Report    ED Chief complaint: Shortness of Breath  . ED Diagnosis:   Final diagnoses:   None       Allergies:   Allergies   Allergen Reactions    Latex Rash    Bees     Macrobid [Nitrofurantoin] Nausea       Code Status: Full Code    Activity level - Baseline/Home:  independent.  Activity Level - Current:   assist of 2.   Lift room needed: No.   Bariatric: No   Needed: No   Isolation: Special  Infection: Not Applicable  COVID r/o and special precautions.     Respiratory status: BiPap    Vital Signs (within 30 minutes):   Vitals:    08/11/24 2045 08/11/24 2047 08/11/24 2050 08/11/24 2055   BP: (!) 96/26 106/63 108/59 108/59   Pulse: 116 111 102 106   Resp:   24    Temp:       TempSrc:       SpO2: 97% 97% 97% 97%       Cardiac Rhythm:  ,   Cardiac  Cardiac Rhythm: Sinus tachycardia  Pain level:    Patient confused: No.   Patient Falls Risk: bed/chair alarm on, nonskid shoes/slippers when out of bed, patient and family education, assistive device/personal items within reach, activity supervised, and room door open.   Elimination Status:  DTV      Patient Report - Initial Complaint: \"Allyson Samuel is a 79 year old female with a history of CAD, heart attack, hypertension, and hyperlipidemia presenting by EMS from home with shortness of breath. The patient has been feeling sick for 3 days. She experienced coughing, but no fevers. She was having difficulty when using the bathroom so she has been taking over the counter medication. Today, her shortness of breath started. She also states that her heart is beating really fast. The EMS provides that she does not have a history of COPD. She was hypoxic originally. Her systolic blood pressure was ranging from 85 to 200. Her breathing rate was 30-40 breathes a minute. The patient denies chest pain. She denies recent travel. No medications.\"   Focused Assessment: Pt much improved since arrival. Work of breathing " improved. Appears relatively comfortable.     Abnormal Results:   Labs Ordered and Resulted from Time of ED Arrival to Time of ED Departure   LACTIC ACID WHOLE BLOOD WITH 1X REPEAT IN 2 HR WHEN >2 - Abnormal       Result Value    Lactic Acid, Initial 4.2 (*)    COMPREHENSIVE METABOLIC PANEL - Abnormal    Sodium 132 (*)     Potassium 4.3      Carbon Dioxide (CO2) 17 (*)     Anion Gap 18 (*)     Urea Nitrogen 12.5      Creatinine 1.16 (*)     GFR Estimate 47 (*)     Calcium 9.2      Chloride 97 (*)     Glucose 241 (*)     Alkaline Phosphatase 101      AST 25      ALT 15      Protein Total 8.0      Albumin 4.4      Bilirubin Total 0.5     TROPONIN T, HIGH SENSITIVITY - Abnormal    Troponin T, High Sensitivity 57 (*)    NT PROBNP INPATIENT - Abnormal    N terminal Pro BNP Inpatient 3,103 (*)    CBC WITH PLATELETS AND DIFFERENTIAL - Abnormal    WBC Count 16.2 (*)     RBC Count 5.38 (*)     Hemoglobin 16.2 (*)     Hematocrit 50.4 (*)     MCV 94      MCH 30.1      MCHC 32.1      RDW 15.0      Platelet Count 191      % Neutrophils 66      % Lymphocytes 24      % Monocytes 9      % Eosinophils 0      % Basophils 1      % Immature Granulocytes 1      NRBCs per 100 WBC 0      Absolute Neutrophils 10.6 (*)     Absolute Lymphocytes 3.9      Absolute Monocytes 1.4 (*)     Absolute Eosinophils 0.0      Absolute Basophils 0.1      Absolute Immature Granulocytes 0.1      Absolute NRBCs 0.0     ISTAT GASES LACTATE VENOUS POCT - Abnormal    Lactic Acid POCT 3.8 (*)     Bicarbonate Venous POCT 20 (*)     O2 Sat, Venous POCT 80 (*)     pCO2 Venous POCT 62 (*)     pH Venous POCT 7.11 (*)     pO2 Venous POCT 60 (*)    ISTAT GASES LACTATE VENOUS POCT - Abnormal    Lactic Acid POCT 2.0      Bicarbonate Venous POCT 20 (*)     O2 Sat, Venous POCT 76 (*)     pCO2 Venous POCT 47      pH Venous POCT 7.23 (*)     pO2 Venous POCT 48 (*)    INR - Normal    INR 1.02     MAGNESIUM - Normal    Magnesium 2.0     PARTIAL THROMBOPLASTIN TIME - Normal     aPTT 26     PROCALCITONIN - Normal    Procalcitonin 0.08     TSH WITH FREE T4 REFLEX - Normal    TSH 1.10     BLOOD GAS ARTERIAL   INFLUENZA A/B, RSV, & SARS-COV2 PCR   LACTIC ACID WHOLE BLOOD   TYPE AND SCREEN, ADULT    ABO/RH(D) A POS      Antibody Screen Negative      SPECIMEN EXPIRATION DATE [id number]     BLOOD CULTURE   ABO/RH TYPE AND SCREEN        XR Chest Port 1 View    (Results Pending)   CT Chest Pulmonary Embolism w Contrast    (Results Pending)       Treatments provided: CXR, BiPAP, labs, antibiotics  Family Comments: Daughter at bedside.  OBS brochure/video discussed/provided to patient:  N/A  ED Medications:   Medications   No lozenges or gum should be given while patient on BIPAP/AVAPS/AVAPS AE (has no administration in time range)   HOLD: All Oral Medications (has no administration in time range)   azithromycin (ZITHROMAX) 500 mg in  mL intermittent infusion (500 mg Intravenous $New Bag 8/11/24 2027)   cefTRIAXone (ROCEPHIN) 2 g vial to attach to  ml bag for ADULTS or NS 50 ml bag for PEDS (2 g Intravenous $New Bag 8/11/24 2028)       Drips infusing:  Yes  For the majority of the shift this patient was Green.   Interventions performed were none.    Sepsis treatment initiated: Yes    Per the ED Provider, Time Zero for severe sepsis or septic shock is:  2012    3 Hour Severe Sepsis Bundle Completion:  1. Initial Lactic Acid Result:   Recent Labs   Lab Test 08/11/24 2052 08/11/24 2008 08/11/24  2004   LACT 2.0 4.2* 3.8*     2. Blood Cultures before Antibiotics: Yes  3. Broad Spectrum Antibiotics Administered:     Anti-infectives (From now, onward)      Start     Dose/Rate Route Frequency Ordered Stop    08/11/24 2010  azithromycin (ZITHROMAX) 500 mg in  mL intermittent infusion         500 mg  over 1 Hours Intravenous ONCE 08/11/24 2007            4. 0 ml of IV fluids have been given so far      6 Hour Severe Sepsis Bundle Completion:    1. Repeat Lactic Acid Level: Last result    Lab Results   Component Value Date    LACT 2.0 08/11/2024     2. Patient currently on Vasopressors =  No    Cares/treatment/interventions/medications to be completed following ED care: continue with plan of care.    ED Nurse Name: Brendan Rouse RN  9:11 PM

## 2024-08-12 NOTE — ED TRIAGE NOTES
BIBA from home. Pt has been sick since Friday, treating with over the counters, startting feeling worse today. Does not have respiratory history per pt. Tachycardic upon arrival. Alert, oriented x4.     Triage Assessment (Adult)       Row Name 08/11/24 2000          Triage Assessment    Airway WDL WDL     Additional Documentation Breath Sounds (Group)        Respiratory WDL    Respiratory WDL X;rhythm/pattern;cough        Breath Sounds    Breath Sounds All Fields     All Lung Fields Breath Sounds Anterior:;Lateral:;coarse        Skin Circulation/Temperature WDL    Skin Circulation/Temperature WDL WDL        Cardiac WDL    Cardiac WDL X;rhythm     Pulse Rate & Regularity apical pulse irregular        Peripheral/Neurovascular WDL    Peripheral Neurovascular WDL WDL        Cognitive/Neuro/Behavioral WDL    Cognitive/Neuro/Behavioral WDL X        Magda Coma Scale    Best Eye Response 4-->(E4) spontaneous     Best Motor Response 6-->(M6) obeys commands     Best Verbal Response 5-->(V5) oriented     Magda Coma Scale Score 15

## 2024-08-12 NOTE — ED PROVIDER NOTES
"  Emergency Department Note      History of Present Illness     Chief Complaint   Shortness of Breath    HPI   Allyson Samuel is a 79 year old female with a history of CAD, heart attack, hypertension, and hyperlipidemia presenting by EMS from home with shortness of breath. The patient has been feeling sick for 3 days with cough and malaise and chills. She experienced coughing, but no known fevers. She felt like she had the \"flu\". Today, her shortness of breath started and dramatically worsened throughout the day. She also states that her heart is beating really fast. The EMS provides that she does not have a history of COPD. EMS reports that she was hypoxic on their arrival with SpO2 in the low 80s. Her BP prior to arrival was labile ranging from 85 to 200 systolic. Her breathing rate was 30-40 breathes a minute. The patient denies chest pain, abdominal pain, pleuritic pain. She denies recent travel. No medications. EMS gave a duoneb prior to arrival without much improvement.     Independent Historian   None    Review of External Notes   None    Past Medical History     Medical History and Problem List   CAD   Heart attack   HTN   Hyperlipidemia   Macular degeneration  PONV   Tobacco use    Medications   No current perscribed medications     Surgical History   Cardiac surgery   Cystoscopy, biopsy bladder, combined, right   Cystoscopy, remove stents, left   Cystoscopy, retrogrades, combined, bulateral   Cystoscopy retrogrades insert stent, left   Davinci nephroureterectomy   Hysterectomy, fibroids. Still has right ovary     Physical Exam     Patient Vitals for the past 24 hrs:   BP Temp Temp src Pulse Resp SpO2   08/11/24 2245 121/59 -- -- 86 -- 95 %   08/11/24 2232 -- -- -- -- -- 95 %   08/11/24 2231 -- -- -- 92 -- --   08/11/24 2230 117/60 -- -- -- -- --   08/11/24 2215 120/75 -- -- 90 -- 96 %   08/11/24 2200 118/63 -- -- 91 -- 96 %   08/11/24 2155 119/57 -- -- 97 -- 94 %   08/11/24 2150 120/65 -- -- 96 -- 95 % "   08/11/24 2145 127/62 -- -- 95 -- 96 %   08/11/24 2140 112/66 -- -- 85 -- 97 %   08/11/24 2135 115/64 -- -- 99 -- 95 %   08/11/24 2130 113/58 -- -- 94 -- 96 %   08/11/24 2125 98/70 -- -- 95 24 97 %   08/11/24 2120 (!) 85/65 -- -- -- -- 97 %   08/11/24 2119 -- -- -- 92 -- --   08/11/24 2115 (!) 87/70 -- -- 54 -- 96 %   08/11/24 2110 92/54 -- -- 98 26 95 %   08/11/24 2105 104/57 -- -- 98 -- 96 %   08/11/24 2100 104/60 -- -- 99 -- 97 %   08/11/24 2055 108/59 -- -- 106 -- 97 %   08/11/24 2050 108/59 -- -- 102 24 97 %   08/11/24 2047 106/63 -- -- 111 -- 97 %   08/11/24 2045 (!) 96/26 -- -- 116 -- 97 %   08/11/24 2040 103/76 -- -- 114 -- 97 %   08/11/24 2035 122/64 -- -- 117 -- 97 %   08/11/24 2030 (!) 140/90 -- -- (!) 128 30 97 %   08/11/24 2028 (!) 161/84 -- -- (!) 129 (!) 37 97 %   08/11/24 2025 (!) 169/102 -- -- (!) 140 (!) 43 97 %   08/11/24 2020 (!) 167/123 -- -- (!) 149 -- 97 %   08/11/24 2015 (!) 178/138 -- -- (!) 150 (!) 47 97 %   08/11/24 2010 (!) 197/119 -- -- (!) 154 -- 95 %   08/11/24 2009 (!) 201/143 -- -- (!) 154 -- 97 %   08/11/24 2005 (!) 175/132 99.6  F (37.6  C) Axillary (!) 154 -- 95 %   08/11/24 2003 -- -- -- (!) 151 (!) 39 97 %   08/11/24 1959 (!) 165/120 -- -- (!) 165 -- 92 %     Physical Exam  General: Appears ill, in respiratory distress.   Head:  Scalp, face, and head appear normal  Eyes:  Pupils are equal, round    Conjunctivae non-injected and sclerae white  ENT:    The external nose is normal    Pinnae are normal  Neck:  Normal range of motion    There is no rigidity noted    Trachea is in the midline  CV:  Tachycardic rate, regular rhythm     Normal S1/S2, no S3/S4    No murmur or rub. Radial pulses 2+ bilaterally.  Resp:  Lungs are equal bilaterally  + tachypnea    Significant increased work of breathing with accessory muscle use. Patient only able to speak 3-4 words at a time.     Breath sounds coarse diffusely without definite wheezing, or rhonchi  GI:  Abdomen is soft, no rigidity or  guarding    No distension, or mass    No tenderness or rebound tenderness   MS:  Normal muscular tone    Symmetric motor strength    No lower extremity edema. No calf swelling or tenderness.  Skin:  No rash or acute skin lesions noted  Neuro:  Awake and alert  Speech is normal and fluent  Moves all extremities spontaneously  Psych: Normal affect. Appropriate interactions.     Diagnostics     Lab Results   Labs Ordered and Resulted from Time of ED Arrival to Time of ED Departure   LACTIC ACID WHOLE BLOOD WITH 1X REPEAT IN 2 HR WHEN >2 - Abnormal       Result Value    Lactic Acid, Initial 4.2 (*)    COMPREHENSIVE METABOLIC PANEL - Abnormal    Sodium 132 (*)     Potassium 4.3      Carbon Dioxide (CO2) 17 (*)     Anion Gap 18 (*)     Urea Nitrogen 12.5      Creatinine 1.16 (*)     GFR Estimate 47 (*)     Calcium 9.2      Chloride 97 (*)     Glucose 241 (*)     Alkaline Phosphatase 101      AST 25      ALT 15      Protein Total 8.0      Albumin 4.4      Bilirubin Total 0.5     TROPONIN T, HIGH SENSITIVITY - Abnormal    Troponin T, High Sensitivity 57 (*)    NT PROBNP INPATIENT - Abnormal    N terminal Pro BNP Inpatient 3,103 (*)    CBC WITH PLATELETS AND DIFFERENTIAL - Abnormal    WBC Count 16.2 (*)     RBC Count 5.38 (*)     Hemoglobin 16.2 (*)     Hematocrit 50.4 (*)     MCV 94      MCH 30.1      MCHC 32.1      RDW 15.0      Platelet Count 191      % Neutrophils 66      % Lymphocytes 24      % Monocytes 9      % Eosinophils 0      % Basophils 1      % Immature Granulocytes 1      NRBCs per 100 WBC 0      Absolute Neutrophils 10.6 (*)     Absolute Lymphocytes 3.9      Absolute Monocytes 1.4 (*)     Absolute Eosinophils 0.0      Absolute Basophils 0.1      Absolute Immature Granulocytes 0.1      Absolute NRBCs 0.0     ISTAT GASES LACTATE VENOUS POCT - Abnormal    Lactic Acid POCT 3.8 (*)     Bicarbonate Venous POCT 20 (*)     O2 Sat, Venous POCT 80 (*)     pCO2 Venous POCT 62 (*)     pH Venous POCT 7.11 (*)     pO2  Venous POCT 60 (*)    ISTAT GASES LACTATE VENOUS POCT - Abnormal    Lactic Acid POCT 2.0      Bicarbonate Venous POCT 20 (*)     O2 Sat, Venous POCT 76 (*)     pCO2 Venous POCT 47      pH Venous POCT 7.23 (*)     pO2 Venous POCT 48 (*)    INFLUENZA A/B, RSV, & SARS-COV2 PCR - Normal    Influenza A PCR Negative      Influenza B PCR Negative      RSV PCR Negative      SARS CoV2 PCR Negative     INR - Normal    INR 1.02     MAGNESIUM - Normal    Magnesium 2.0     PARTIAL THROMBOPLASTIN TIME - Normal    aPTT 26     PROCALCITONIN - Normal    Procalcitonin 0.08     TSH WITH FREE T4 REFLEX - Normal    TSH 1.10     BLOOD GAS ARTERIAL   LACTIC ACID WHOLE BLOOD   LACTIC ACID WHOLE BLOOD   TYPE AND SCREEN, ADULT    ABO/RH(D) A POS      Antibody Screen Negative      SPECIMEN EXPIRATION DATE [id number]     BLOOD CULTURE   ABO/RH TYPE AND SCREEN       Imaging   XR Chest Port 1 View   Final Result   IMPRESSION:       Diffuse interstitial prominence with hazy right mid to lower lung and left lower lung opacities, either representing mild interstitial and alveolar pulmonary edema versus multifocal atypical infection.      No pleural effusion or pneumothorax.      The cardiac and mediastinal silhouette is unremarkable. Aortic calcifications.      CT Chest Pulmonary Embolism w Contrast    (Results Pending)       EKG   ECG taken at 2015, ECG read at 2025  Wide QRS tachycardia with premature ventricular complexes or fusion complexes   Left axis deviation  Non-specific intraventricular block  Minimal voltage criteria for LVH, may be normal variant (heladio product)  Cannot rule out anteroseptal infarct   No myocardial infarction    Rate 149 bpm. NV interval * ms. QRS duration 134 ms. QT/QTc 280/441 ms. P-R-T axes * -48 75.    ECG taken at 2139, ECG read at 2146  Sinus tachycardia  Left axis deviation  Nonspecific intraventricular block  Minimal voltage criteria for LVH, may be normal variant (heladio product)  Cannot rule out  anteroseptal infarct, age undetermined   Abnormal ECG  No myocardial infarction   Rate 101 bmp. NC interval 174 ms. QRS duration 136 ms. QT/QTc 400/518 ms. P-R-T axes 71 -71 89.     Independent Interpretation   None    ED Course      Medications Administered   Medications   No lozenges or gum should be given while patient on BIPAP/AVAPS/AVAPS AE (has no administration in time range)   HOLD: All Oral Medications (has no administration in time range)   cefTRIAXone (ROCEPHIN) 2 g vial to attach to  ml bag for ADULTS or NS 50 ml bag for PEDS (2 g Intravenous $New Bag 8/11/24 2028)   azithromycin (ZITHROMAX) 500 mg in  mL intermittent infusion (500 mg Intravenous $New Bag 8/11/24 2027)   sodium chloride 0.9% BOLUS 500 mL (500 mLs Intravenous $New Bag 8/11/24 2136)   iopamidol (ISOVUE-370) solution 500 mL (75 mLs Intravenous $Given 8/11/24 2255)   for CT scan flush use (83 mLs Intravenous $Given 8/11/24 2255)       Procedures   Procedures     Discussion of Management   None    ED Course   ED Course as of 08/11/24 2325   Sun Aug 11, 2024   1955 I obtained the history and examined the patient as above.    2031 Patient rechecked. RR, HR improving. Patient appears more comfortable.    2123 I rechecked and updated the patient.     2125 Patient rechecked.  Blood pressure mildly hypotensive.  500 mL IV fluid bolus was given and BiPAP settings were turned down from 16/8 to 12/6.   2315 Case discussed with hospitalist, Dr. Wong, who accepts the patient for admission       Additional Documentation  None    Medical Decision Making / Diagnosis     CMS Diagnoses: Severe Sepsis      1.   Initial hypotension  defined as 2 bps < 90 or map < 65 in the 6hrs before or 3hrs after time zero.     2.  Lactate >4.      The patient has signs of Severe Sepsis as evidenced by:    1. 2 SIRS criteria, AND  2. Suspected infection, AND   3. Organ dysfunction: Lactic Acidosis with value >2.0 and New need for positive pressure  ventilation    Time severe sepsis diagnosis confirmed: 2008 08/11/24 as this was the time when Lactate resulted, and the level was > 2.0    3 Hour Severe Sepsis Bundle Completion:    1. Initial Lactic Acid Result:   Recent Labs   Lab Test 08/11/24 2052 08/11/24 2008 08/11/24  2004   LACT 2.0 4.2* 3.8*     2. Blood Cultures before Antibiotics: Yes  Note: Due to a national blood culture bottle shortage, reduced blood cultures may have been drawn on this patient.  3. Broad Spectrum Antibiotics Administered:  yes       Anti-infectives (From admission through now)      Start     Dose/Rate Route Frequency Ordered Stop    08/11/24 2010  cefTRIAXone (ROCEPHIN) 2 g vial to attach to  ml bag for ADULTS or NS 50 ml bag for PEDS         2 g  over 30 Minutes Intravenous ONCE 08/11/24 2007 08/11/24 2058 08/11/24 2010  azithromycin (ZITHROMAX) 500 mg in  mL intermittent infusion         500 mg  over 1 Hours Intravenous ONCE 08/11/24 2007 08/11/24 2127            4. Is initial hypotension present?     No (IV fluid bolus NOT required). IV Fluid volume administered: 1000mL                    Severe Sepsis reassessment:  1. Repeat Lactic Acid Level within 6 hours of time zero: 2.0 at 2052  2. MAP>65 after initial IVF bolus, will continue to monitor fluid status and vital signs    I attest to having performed a repeat sepsis exam and assessment of perfusion at 2107 and the results demonstrate improved perfusion.    MIPS   CT for PE was ordered because the patient is high risk for pulmonary embolism.    CARLOZ Samuel is a 79 year old female who presents with 1 week of flulike symptoms with cough malaise and fatigue which resulted in onset of severe respiratory distress earlier today.  No known history of asthma, COPD however she has been a longtime smoker.  On arrival to the emergency department the patient was an extremis with severe respiratory distress, increased work of breathing, hypoxia and hypercapnia.  She  was immediately placed on BiPAP.  On auscultation of the lungs breath sounds were coarse bilaterally but there was no wheezing or rhonchi.  DuoNeb prior to arrival did not help symptoms.  Patient was treated with IV fluids, antibiotics for presumed respiratory infection with severe sepsis and her work of breathing, and respiratory status dramatically improved on BiPAP.  Initial EKG showed a wide-complex regular tachycardia with unclear rhythm.  Atrial fibrillation, atrial flutter with 2-1 conduction, ventricular tachycardia and sinus tachycardia were all considered.  Following improvement in her respiratory status and supportive care repeat EKG showed restoration of normal sinus rhythm without acute ST segment or T wave changes.  Findings not consistent with acute coronary syndrome.  Troponin is mildly elevated likely due to type II demand ischemia from hypoxia/respiratory failure.  Etiology of her respiratory failure is infectious/pneumonia versus CHF.  Given preceding cough and infectious symptoms I favor pneumonia.  She was covered with empiric antibiotics. No peripheral volume overload or other findings to suggest decompensated CHF. COVID/Influenza/RSV testing negative.  CT of the chest was obtained to rule out pulmonary embolism and obtain better imaging of the lung parenchyma.  These results are pending at the time of admission.  Based on my preliminary interpretation of the chest CT images I see no large central or saddle pulmonary embolus. No pericardial effusion.  Given the findings the patient will require admission to the hospital for further monitoring evaluation and treatment.  The case was discussed with the hospitalist and the patient was admitted in stabilized and improved condition.    Critical Care Time:   Upon my evaluation, this patient had a critical illness with high probability of imminent or life-threatening deterioration due to respiratory failure and severe sepsis, which required my direct  attention, intervention, and personal management.    I have personally provided 62 minutes of critical care time exclusive of time spent on separately billable procedures. Time includes review of laboratory data, radiology results, discussion with consultants, reassessment of the patient and monitoring for potential decompensation. Interventions were performed as documented above.      Disposition   The patient was admitted.     Diagnosis     ICD-10-CM    1. Acute respiratory failure with hypoxia and hypercapnia (H)  J96.01     J96.02       2. Pneumonia of both lungs due to infectious organism, unspecified part of lung  J18.9                Scribe Disclosure:  I, Phoenix Peterson, am serving as a scribe at 8:15 PM on 8/11/2024 to document services personally performed by Luis Ji MD based on my observations and the provider's statements to me.        Luis Ji MD  08/11/24 [id number]       Luis Ji MD  08/12/24 [id number]

## 2024-08-12 NOTE — PROGRESS NOTES
"A BiPAP of  12/6 @ 40% continue to be applied to the pt via the mask for an increase in WOB and/or SOB.  The bridge of the nose looks good and remains intact. Pt is tolerating it well. Will continue to monitor and assess the pt's current respiratory status and needs.     Venous Blood Gas  Recent Labs   Lab 08/11/24 2052 08/11/24 2004   PHV 7.23* 7.11*   PCO2V 47 62*   PO2V 48* 60*   HCO3V 20* 20*     Vital signs:  Temp: 99.6  F (37.6  C) Temp src: Axillary BP: 125/65 Pulse: 77   Resp: 16 SpO2: 98 % O2 Device: BiPAP/CPAP   Height: 157.5 cm (5' 2\") Weight: 90.2 kg (198 lb 13.7 oz)  Estimated body mass index is 36.37 kg/m  as calculated from the following:    Height as of this encounter: 1.575 m (5' 2\").    Weight as of this encounter: 90.2 kg (198 lb 13.7 oz).      Mayte Edwards, RT on 8/12/2024 at 3:01 AM      "

## 2024-08-12 NOTE — PROGRESS NOTES
A BiPAP of  16/8 @ 70% was applied to the pt via the mask for an increase in WOB and/or SOB.  The bridge of the nose looks good and remains intact. Pt is tolerating it well. Will continue to monitor and assess the pt's current respiratory status and needs.    Mayte Edwards, RT on 8/11/2024 at 8:20 PM

## 2024-08-12 NOTE — PHARMACY-ADMISSION MEDICATION HISTORY
Pharmacist Admission Medication History    Admission medication history is complete. The information provided in this note is only as accurate as the sources available at the time of the update.    Information Source(s): Patient and Patient's pharmacy via in-person    Pertinent Information:     Changes made to PTA medication list:  Added: None  Deleted: Atorvastatin, Lisinopril, metoprolol,nitroglycerin, timolol and trazoprost Z   Changed: None    Allergies reviewed with patient and updates made in EHR: yes    Medication History Completed By: Jennifer Gunter RPH 8/11/2024 8:14 PM    PTA Med List   Medication Sig Last Dose    acetaminophen 650 MG TABS Take 650 mg by mouth every 6 hours Unknown    aspirin 81 MG tablet Take 1 tablet (81 mg) by mouth daily 8/10/2024   '

## 2024-08-12 NOTE — H&P
[facility]    History and Physical - Hospitalist Service       Date of Admission:  8/11/2024    Assessment & Plan      Allyson Samuel is a 79 year old female admitted on 8/11/2024. She has a past medical history of coronary artery disease, active tobacco use, hypertension, dyslipidemia, prediabetes, urothelial bladder cancer, macular degeneration.    1/.  Acute hypoxemic respiratory failure: Likely related to COPD exacerbation as well as community-acquired pneumonia.  Supportive cares, BiPAP at this time wean off as possible.  Respiratory therapy consultation.  2/.  COPD exacerbation: Will treat with IV steroids, nebulizers.  Will likely require inhalers and nebulizers at discharge.  3/.  Community-acquired pneumonia in the setting of COPD.  Treated with IV antibiotics, will require appropriate duration of the same.  /.  Sepsis: Likely because of problem 2 and 3: Declining lactic acid now in the normal range.  5/.  CODE STATUS discussed with patient and her daughter DNR/DNI confirmed.  6/. coronary artery disease lost to follow-up not on medications we will check cardiac echogram.  7/.  Patient's last A1c was greater than 6 it was about 8 years ago blood glucose is elevated on arrival will check an A1c, place on Accu-Cheks and sliding scale insulin I will not be surprised if she is a type II diabetic.  8/.  Active tobacco use: Offered nicotine replacement product she declined.    Since the patient is on BiPAP she be admitted to intermediate care under inpatient status          Diet: Combination Diet Regular Diet Adult    DVT Prophylaxis: Enoxaparin (Lovenox) SQ  Prado Catheter: Not present  Lines: None     Cardiac Monitoring: None  Code Status:  DNR/DNI, discussed with patient and her daughter at the bedside    Clinically Significant Risk Factors Present on Admission                # Drug Induced Platelet Defect: home medication list includes an antiplatelet medication   # Hypertension: Noted on  "problem list                    Disposition Plan     Medically Ready for Discharge: Anticipated in 2-4 Days           Edel Wong MD  Hospitalist Service  [facility]  Securely message with [employer] (more info)  Text page via [employer] Paging/Directory     ______________________________________________________________________    Chief Complaint   Difficulty breathing    History is obtained from the patient, patient's daughter who is present at the bedside, electronic health record, and emergency department physician    History of Present Illness   Allyson Samuel is a 79 year old female who has a past medical history of coronary artery disease, active tobacco use, hypertension, dyslipidemia, prediabetes, urothelial bladder cancer, macular degeneration.  Patient is being admitted through the emergency department patient presented with complaints of worsening shortness of breath for the last 3-4 days.  According the patient who lives at home with her daughter she has been feeling poorly for the last few days and today that is to return for worse and hence she was forced to come to the ER.  According to the patient, \"things were so bad that I could not even smoke a cigarette \".  On arrival patient was noted to be in acute hypoxic failure, she was tachypneic requiring placement of BiPAP.  Workup revealed her to have an elevated WBC count concerns for pneumonia and an elevated lactic acid.  No evidence of pulmonary embolism was noted.  At the time of my evaluation of the patient in the emergency room she tells me she is feeling a lot better.  She has been pancultured has received IV antibiotics and is able to talk in full sentences and is completely coherent.  Note for the patient has been lost to follow-up for at least 8 to 10 years in the context of her coronary artery disease.  She has only seen her ophthalmologist as well as her urologist in the last 5 years.  She lives at home with her daughter, " smokes about a pack of cigarettes on a daily basis.  Does not have any inhalers or nebulizers and does not drive.  She does not use a cane or any other assistive device for ambulation      Past Medical History    Past Medical History:   Diagnosis Date    CAD (coronary artery disease)     2003- MI with PCI to RCA, 6/2014 inferior STEMI- instent thrombosis RCA s/p PCI    Heart attack (H)     HTN (hypertension)     Hyperlipidemia LDL goal <70     Macular degeneration     PONV (postoperative nausea and vomiting)     Tobacco use        Past Surgical History   Past Surgical History:   Procedure Laterality Date    CARDIAC SURGERY      CYSTOSCOPY, BIOPSY BLADDER, COMBINED Right 4/14/2016    Procedure: COMBINED CYSTOSCOPY, BIOPSY BLADDER;  Surgeon: Forest Jim MD;  Location: [facility]    CYSTOSCOPY, REMOVE STENT(S), COMBINED Left 4/14/2015    Procedure: COMBINED CYSTOSCOPY, REMOVE STENT(S);  Surgeon: Forest Jim MD;  Location: [facility]    CYSTOSCOPY, RETROGRADES, COMBINED Left 10/21/2015    Procedure: COMBINED CYSTOSCOPY, RETROGRADES;  Surgeon: Forest Jim MD;  Location: [facility]    CYSTOSCOPY, RETROGRADES, COMBINED Right 5/4/2017    Procedure: COMBINED CYSTOSCOPY, RETROGRADES;  Cystoscopy,Right Retrograde Pyelogram;  Surgeon: Forest Jim MD;  Location: [facility]    CYSTOSCOPY, RETROGRADES, INSERT STENT URETER(S), COMBINED Left 3/12/2015    Procedure: COMBINED CYSTOSCOPY, RETROGRADES, INSERT STENT URETER(S);  Surgeon: Forest Jim MD;  Location: [facility]    DAVINCI NEPHROURETERECTOMY Left 4/14/2015    Procedure: DAVINCI NEPHROURETERECTOMY;  Surgeon: Forest Jim MD;  Location: [facility] OR    GYN SURGERY      HYSTERECTOMY      age 40, fibroids. Still has right ovary       Prior to Admission Medications   Prior to Admission Medications   Prescriptions Last Dose Informant Patient Reported? Taking?   acetaminophen 650 MG TABS Unknown  No Yes   Sig: Take 650  mg by mouth every 6 hours   aspirin 81 MG tablet 8/10/2024 Self Yes Yes   Sig: Take 1 tablet (81 mg) by mouth daily      Facility-Administered Medications: None        Review of Systems    The 10 point Review of Systems is negative other than noted in the HPI or here.     Social History   I have reviewed this patient's social history and updated it with pertinent information if needed.  Social History     Tobacco Use    Smoking status: Every Day     Current packs/day: 0.10     Types: Cigarettes    Smokeless tobacco: Never    Tobacco comments:     Trying hard to quit 9/24/15.  smokes about 2-3 cig per day   Substance Use Topics    Alcohol use: Yes     Comment: few drinks per month    Drug use: No         Family History   I have reviewed this patient's family history and updated it with pertinent information if needed.  Family History   Problem Relation Age of Onset    Breast Cancer Mother     Cerebrovascular Disease Father     Heart Disease Brother         pacemaker         Allergies   Allergies   Allergen Reactions    Latex Rash    Bees     Macrobid [Nitrofurantoin] Nausea        Physical Exam   Vital Signs: Temp: 99.6  F (37.6  C) Temp src: Axillary BP: 121/59 Pulse: 86   Resp: 24 SpO2: 95 % O2 Device: BiPAP/CPAP    Weight: 0 lbs 0 oz    General Appearance: Alert awake oriented x 3 appears to be chronically unwell able to talk in full sentences  Respiratory: Expiratory wheezing  Cardiovascular: S1-S2  GI: Obese nontender bowel sounds are present  Skin: No obvious rash or lesions  Other: Dry oral mucosa  1+ lower extremity edema  No obvious neurological deficit, patient is moving all 4 extremities without any difficulty and able to have coherent conversation as mentioned    Medical Decision Making       80 MINUTES SPENT BY ME on the date of service doing chart review, history, exam, documentation & further activities per the note.      Data   ------------------------- PAST 24 HR DATA REVIEWED  -----------------------------------------------    I have personally reviewed the following data over the past 24 hrs:    16.2 (H)  \   16.2 (H)   / 191     132 (L) 97 (L) 12.5 /  241 (H)   4.3 17 (L) 1.16 (H) \     ALT: 15 AST: 25 AP: 101 TBILI: 0.5   ALB: 4.4 TOT PROTEIN: 8.0 LIPASE: N/A     Trop: 57 (H) BNP: 3,103 (H)     TSH: 1.10 T4: N/A A1C: N/A     Procal: 0.08 CRP: N/A Lactic Acid: 2.0       INR:  1.02 PTT:  26   D-dimer:  N/A Fibrinogen:  N/A       Imaging results reviewed over the past 24 hrs:   Recent Results (from the past 24 hour(s))   XR Chest Port 1 View    Narrative    EXAM: XR CHEST PORT 1 VIEW  LOCATION: [facility]  DATE: 8/11/2024    INDICATION: Respiratory distress.  COMPARISON: 4/14/2015      Impression    IMPRESSION:     Diffuse interstitial prominence with hazy right mid to lower lung and left lower lung opacities, either representing mild interstitial and alveolar pulmonary edema versus multifocal atypical infection.    No pleural effusion or pneumothorax.    The cardiac and mediastinal silhouette is unremarkable. Aortic calcifications.

## 2024-08-12 NOTE — ED NOTES
Bed: ED33  Expected date:   Expected time:   Means of arrival:   Comments:  3 ED RN at 1900, 2 at 2300, 1 Boarder RN

## 2024-08-13 LAB
ANION GAP SERPL CALCULATED.3IONS-SCNC: 12 MMOL/L (ref 7–15)
BASE EXCESS BLDV CALC-SCNC: -4.1 MMOL/L (ref -3–3)
BUN SERPL-MCNC: 23.6 MG/DL (ref 8–23)
CA-I BLD-MCNC: 4.6 MG/DL (ref 4.4–5.2)
CALCIUM SERPL-MCNC: 8.9 MG/DL (ref 8.8–10.4)
CHLORIDE SERPL-SCNC: 100 MMOL/L (ref 98–107)
CREAT SERPL-MCNC: 1.2 MG/DL (ref 0.51–0.95)
CYSTATIN C (ROCHE): 1.5 MG/L (ref 0.6–1)
EGFRCR SERPLBLD CKD-EPI 2021: 45 ML/MIN/1.73M2
ERYTHROCYTE [DISTWIDTH] IN BLOOD BY AUTOMATED COUNT: 15.1 % (ref 10–15)
GFR/BSA.PRED SERPLBLD CYS-BASED-ARV: 39 ML/MIN/1.73M2
GLUCOSE BLDC GLUCOMTR-MCNC: 146 MG/DL (ref 70–99)
GLUCOSE BLDC GLUCOMTR-MCNC: 174 MG/DL (ref 70–99)
GLUCOSE BLDC GLUCOMTR-MCNC: 179 MG/DL (ref 70–99)
GLUCOSE BLDC GLUCOMTR-MCNC: 187 MG/DL (ref 70–99)
GLUCOSE SERPL-MCNC: 189 MG/DL (ref 70–99)
HCO3 BLDV-SCNC: 23 MMOL/L (ref 21–28)
HCO3 SERPL-SCNC: 20 MMOL/L (ref 22–29)
HCT VFR BLD AUTO: 41.4 % (ref 35–47)
HGB BLD-MCNC: 13.6 G/DL (ref 11.7–15.7)
MAGNESIUM SERPL-MCNC: 2 MG/DL (ref 1.7–2.3)
MCH RBC QN AUTO: 29.8 PG (ref 26.5–33)
MCHC RBC AUTO-ENTMCNC: 32.9 G/DL (ref 31.5–36.5)
MCV RBC AUTO: 91 FL (ref 78–100)
O2/TOTAL GAS SETTING VFR VENT: 5 %
OXYHGB MFR BLDV: 83 % (ref 70–75)
PCO2 BLDV: 47 MM HG (ref 40–50)
PH BLDV: 7.29 [PH] (ref 7.32–7.43)
PHOSPHATE SERPL-MCNC: 3.6 MG/DL (ref 2.5–4.5)
PLATELET # BLD AUTO: 150 10E3/UL (ref 150–450)
PO2 BLDV: 50 MM HG (ref 25–47)
POTASSIUM SERPL-SCNC: 4.1 MMOL/L (ref 3.4–5.3)
RBC # BLD AUTO: 4.56 10E6/UL (ref 3.8–5.2)
SAO2 % BLDV: 84 % (ref 70–75)
SODIUM SERPL-SCNC: 132 MMOL/L (ref 135–145)
WBC # BLD AUTO: 13.2 10E3/UL (ref 4–11)

## 2024-08-13 PROCEDURE — 250N000009 HC RX 250: Performed by: INTERNAL MEDICINE

## 2024-08-13 PROCEDURE — 80048 BASIC METABOLIC PNL TOTAL CA: CPT | Performed by: INTERNAL MEDICINE

## 2024-08-13 PROCEDURE — 99232 SBSQ HOSP IP/OBS MODERATE 35: CPT | Performed by: INTERNAL MEDICINE

## 2024-08-13 PROCEDURE — 250N000013 HC RX MED GY IP 250 OP 250 PS 637: Performed by: INTERNAL MEDICINE

## 2024-08-13 PROCEDURE — 999N000157 HC STATISTIC RCP TIME EA 10 MIN

## 2024-08-13 PROCEDURE — 250N000011 HC RX IP 250 OP 636: Performed by: INTERNAL MEDICINE

## 2024-08-13 PROCEDURE — 94640 AIRWAY INHALATION TREATMENT: CPT | Mod: 76

## 2024-08-13 PROCEDURE — 83735 ASSAY OF MAGNESIUM: CPT | Performed by: INTERNAL MEDICINE

## 2024-08-13 PROCEDURE — 120N000013 HC R&B IMCU

## 2024-08-13 PROCEDURE — 36415 COLL VENOUS BLD VENIPUNCTURE: CPT | Performed by: INTERNAL MEDICINE

## 2024-08-13 PROCEDURE — 84100 ASSAY OF PHOSPHORUS: CPT | Performed by: INTERNAL MEDICINE

## 2024-08-13 PROCEDURE — 99223 1ST HOSP IP/OBS HIGH 75: CPT | Performed by: INTERNAL MEDICINE

## 2024-08-13 PROCEDURE — 94640 AIRWAY INHALATION TREATMENT: CPT

## 2024-08-13 PROCEDURE — 82805 BLOOD GASES W/O2 SATURATION: CPT | Performed by: INTERNAL MEDICINE

## 2024-08-13 PROCEDURE — 94660 CPAP INITIATION&MGMT: CPT

## 2024-08-13 PROCEDURE — 85027 COMPLETE CBC AUTOMATED: CPT | Performed by: INTERNAL MEDICINE

## 2024-08-13 PROCEDURE — 82330 ASSAY OF CALCIUM: CPT | Performed by: INTERNAL MEDICINE

## 2024-08-13 RX ORDER — ACETYLCYSTEINE 100 MG/ML
4 SOLUTION ORAL; RESPIRATORY (INHALATION) 4 TIMES DAILY
Status: DISCONTINUED | OUTPATIENT
Start: 2024-08-13 | End: 2024-08-17 | Stop reason: HOSPADM

## 2024-08-13 RX ORDER — CARBOXYMETHYLCELLULOSE SODIUM 5 MG/ML
1 SOLUTION/ DROPS OPHTHALMIC
Status: DISCONTINUED | OUTPATIENT
Start: 2024-08-13 | End: 2024-08-14

## 2024-08-13 RX ORDER — IPRATROPIUM BROMIDE AND ALBUTEROL SULFATE 2.5; .5 MG/3ML; MG/3ML
3 SOLUTION RESPIRATORY (INHALATION)
Status: DISCONTINUED | OUTPATIENT
Start: 2024-08-13 | End: 2024-08-14

## 2024-08-13 RX ORDER — LIDOCAINE 40 MG/G
CREAM TOPICAL
Status: DISCONTINUED | OUTPATIENT
Start: 2024-08-13 | End: 2024-08-17 | Stop reason: HOSPADM

## 2024-08-13 RX ORDER — PREDNISONE 20 MG/1
60 TABLET ORAL DAILY
Status: COMPLETED | OUTPATIENT
Start: 2024-08-14 | End: 2024-08-16

## 2024-08-13 RX ORDER — ALBUTEROL SULFATE 0.83 MG/ML
2.5 SOLUTION RESPIRATORY (INHALATION) 4 TIMES DAILY
Status: DISCONTINUED | OUTPATIENT
Start: 2024-08-13 | End: 2024-08-13

## 2024-08-13 RX ADMIN — CEFTRIAXONE 2 G: 2 INJECTION, POWDER, FOR SOLUTION INTRAMUSCULAR; INTRAVENOUS at 20:14

## 2024-08-13 RX ADMIN — IPRATROPIUM BROMIDE AND ALBUTEROL SULFATE 3 ML: .5; 3 SOLUTION RESPIRATORY (INHALATION) at 08:04

## 2024-08-13 RX ADMIN — METHYLPREDNISOLONE SODIUM SUCCINATE 40 MG: 40 INJECTION, POWDER, FOR SOLUTION INTRAMUSCULAR; INTRAVENOUS at 06:06

## 2024-08-13 RX ADMIN — IPRATROPIUM BROMIDE AND ALBUTEROL SULFATE 3 ML: .5; 3 SOLUTION RESPIRATORY (INHALATION) at 12:02

## 2024-08-13 RX ADMIN — FUROSEMIDE 40 MG: 10 INJECTION, SOLUTION INTRAMUSCULAR; INTRAVENOUS at 00:06

## 2024-08-13 RX ADMIN — METOPROLOL SUCCINATE 25 MG: 25 TABLET, EXTENDED RELEASE ORAL at 09:24

## 2024-08-13 RX ADMIN — ENOXAPARIN SODIUM 40 MG: 40 INJECTION SUBCUTANEOUS at 09:24

## 2024-08-13 RX ADMIN — IPRATROPIUM BROMIDE AND ALBUTEROL SULFATE 3 ML: .5; 3 SOLUTION RESPIRATORY (INHALATION) at 16:30

## 2024-08-13 RX ADMIN — IPRATROPIUM BROMIDE AND ALBUTEROL SULFATE 3 ML: .5; 3 SOLUTION RESPIRATORY (INHALATION) at 00:02

## 2024-08-13 RX ADMIN — IPRATROPIUM BROMIDE AND ALBUTEROL SULFATE 3 ML: .5; 3 SOLUTION RESPIRATORY (INHALATION) at 19:57

## 2024-08-13 RX ADMIN — ALBUTEROL SULFATE 2.5 MG: 2.5 SOLUTION RESPIRATORY (INHALATION) at 04:06

## 2024-08-13 RX ADMIN — ALBUTEROL SULFATE 2.5 MG: 2.5 SOLUTION RESPIRATORY (INHALATION) at 06:18

## 2024-08-13 RX ADMIN — ACETYLCYSTEINE 4 ML: 100 SOLUTION ORAL; RESPIRATORY (INHALATION) at 16:30

## 2024-08-13 RX ADMIN — ACETYLCYSTEINE 4 ML: 100 SOLUTION ORAL; RESPIRATORY (INHALATION) at 12:02

## 2024-08-13 RX ADMIN — AMLODIPINE BESYLATE 5 MG: 5 TABLET ORAL at 09:24

## 2024-08-13 RX ADMIN — ACETYLCYSTEINE 4 ML: 100 SOLUTION ORAL; RESPIRATORY (INHALATION) at 08:04

## 2024-08-13 RX ADMIN — AZITHROMYCIN DIHYDRATE 250 MG: 250 TABLET ORAL at 09:24

## 2024-08-13 RX ADMIN — ASPIRIN 81 MG: 81 TABLET, COATED ORAL at 09:24

## 2024-08-13 RX ADMIN — ACETYLCYSTEINE 4 ML: 100 SOLUTION ORAL; RESPIRATORY (INHALATION) at 19:57

## 2024-08-13 ASSESSMENT — ACTIVITIES OF DAILY LIVING (ADL)
ADLS_ACUITY_SCORE: 37
ADLS_ACUITY_SCORE: 41
ADLS_ACUITY_SCORE: 33
ADLS_ACUITY_SCORE: 33
ADLS_ACUITY_SCORE: 41
ADLS_ACUITY_SCORE: 37
ADLS_ACUITY_SCORE: 33
ADLS_ACUITY_SCORE: 37
ADLS_ACUITY_SCORE: 41
ADLS_ACUITY_SCORE: 37
ADLS_ACUITY_SCORE: 41
ADLS_ACUITY_SCORE: 41
ADLS_ACUITY_SCORE: 37
ADLS_ACUITY_SCORE: 37
ADLS_ACUITY_SCORE: 41
ADLS_ACUITY_SCORE: 41
ADLS_ACUITY_SCORE: 37
ADLS_ACUITY_SCORE: 37
ADLS_ACUITY_SCORE: 41
ADLS_ACUITY_SCORE: 37
ADLS_ACUITY_SCORE: 37
ADLS_ACUITY_SCORE: 33
ADLS_ACUITY_SCORE: 41

## 2024-08-13 NOTE — PLAN OF CARE
"Temp: 98.3  F (36.8  C) Temp src: Oral BP: (!) 149/74 Pulse: 94   Resp: 20 SpO2: 95 % O2 Device: Oxymask Oxygen Delivery: 5 LPM     Orientation:  A&O x4  VS: VSS  Pain:  Denies Pain  Tele:  SR with Prolonged Qtc  Activity:  A1 GB W  Resp:  5L oxymask  GI:  Denies nausea and vomiting  : Voiding into purwick  Skin:  WDL  Lines: Piv SL  Diet: Regular  Labs:  Potassium, Mag, Phosphorus protocol   Plan: Home  Discharge:  Pending    Problem: Adult Inpatient Plan of Care  Goal: Plan of Care Review  Description: The Plan of Care Review/Shift note should be completed every shift.  The Outcome Evaluation is a brief statement about your assessment that the patient is improving, declining, or no change.  This information will be displayed automatically on your shift  note.  Outcome: Progressing  Flowsheets (Taken 8/13/2024 1238)  Outcome Evaluation: Oxy Mask on 5L. Pt states feeling better.  Plan of Care Reviewed With: patient  Overall Patient Progress: improving  Goal: Patient-Specific Goal (Individualized)  Description: You can add care plan individualizations to a care plan. Examples of Individualization might be:  \"Parent requests to be called daily at 9am for status\", \"I have a hard time hearing out of my right ear\", or \"Do not touch me to wake me up as it startles  me\".  Outcome: Progressing  Goal: Absence of Hospital-Acquired Illness or Injury  Outcome: Progressing  Intervention: Identify and Manage Fall Risk  Recent Flowsheet Documentation  Taken 8/13/2024 0942 by Quynh Alvarado RN  Safety Promotion/Fall Prevention: safety round/check completed  Intervention: Prevent Infection  Recent Flowsheet Documentation  Taken 8/13/2024 0937 by Quynh Alvarado RN  Infection Prevention:   single patient room provided   rest/sleep promoted  Goal: Optimal Comfort and Wellbeing  Outcome: Progressing  Goal: Readiness for Transition of Care  Outcome: Progressing       Goal Outcome Evaluation:      Plan of Care Reviewed " With: patient    Overall Patient Progress: improvingOverall Patient Progress: improving    Outcome Evaluation: Oxy Mask on 5L. Pt states feeling better.

## 2024-08-13 NOTE — PLAN OF CARE
"Assumed cares at 1630. Alert and oriented x4. BP and HR elevated. On 3L O2. SANCHEZ. Labored breathing. Denies pain or CP. Daughter at bedside. A1 with gait belt and walker. Regular diet. Triggered sepsis this shift; MD notified.           Goal Outcome Evaluation:      Plan of Care Reviewed With: patient, child    Overall Patient Progress: no changeOverall Patient Progress: no change       Problem: Adult Inpatient Plan of Care  Goal: Plan of Care Review  Description: The Plan of Care Review/Shift note should be completed every shift.  The Outcome Evaluation is a brief statement about your assessment that the patient is improving, declining, or no change.  This information will be displayed automatically on your shift  note.  Outcome: Progressing  Flowsheets (Taken 8/12/2024 2025)  Plan of Care Reviewed With:   patient   child  Overall Patient Progress: no change  Goal: Patient-Specific Goal (Individualized)  Description: You can add care plan individualizations to a care plan. Examples of Individualization might be:  \"Parent requests to be called daily at 9am for status\", \"I have a hard time hearing out of my right ear\", or \"Do not touch me to wake me up as it startles  me\".  Outcome: Progressing  Goal: Absence of Hospital-Acquired Illness or Injury  Outcome: Progressing  Intervention: Identify and Manage Fall Risk  Flowsheets  Taken 8/12/2024 2025  Safety Promotion/Fall Prevention: safety round/check completed  Taken 8/12/2024 1630  Safety Promotion/Fall Prevention: safety round/check completed  Intervention: Prevent Skin Injury  Flowsheets  Taken 8/12/2024 2025  Body Position:   position changed independently   upper extremity elevated  Skin Protection:   transparent dressing maintained   adhesive use limited  Device Skin Pressure Protection:   absorbent pad utilized/changed   adhesive use limited  Taken 8/12/2024 1630  Body Position:   position changed independently   upper extremity elevated  Skin Protection:   " transparent dressing maintained   adhesive use limited  Device Skin Pressure Protection:   absorbent pad utilized/changed   adhesive use limited  Intervention: Prevent and Manage VTE (Venous Thromboembolism) Risk  Flowsheets  Taken 8/12/2024 2025  VTE Prevention/Management: SCDs off (sequential compression devices)  Taken 8/12/2024 1630  VTE Prevention/Management: SCDs off (sequential compression devices)  Intervention: Prevent Infection  Flowsheets  Taken 8/12/2024 2025  Infection Prevention:   single patient room provided   rest/sleep promoted  Taken 8/12/2024 1630  Infection Prevention:   single patient room provided   rest/sleep promoted  Goal: Optimal Comfort and Wellbeing  Outcome: Progressing  Intervention: Monitor Pain and Promote Comfort  Flowsheets (Taken 8/12/2024 2025)  Pain Management Interventions: care clustered  Intervention: Provide Person-Centered Care  Flowsheets  Taken 8/12/2024 2025  Trust Relationship/Rapport:   choices provided   care explained   questions encouraged   questions answered   reassurance provided   thoughts/feelings acknowledged  Taken 8/12/2024 1630  Trust Relationship/Rapport:   choices provided   care explained   questions encouraged   questions answered   reassurance provided   thoughts/feelings acknowledged  Goal: Readiness for Transition of Care  Outcome: Progressing     Problem: Skin Injury Risk Increased  Goal: Skin Health and Integrity  Outcome: Progressing  Intervention: Plan: Nurse Driven Intervention: Moisture Management  Flowsheets  Taken 8/12/2024 2025  Moisture Interventions:   Encourage regular toileting   Incontinence pad  Incontinence Protocol in Use: Active  Skin Appearance: Reddened  Frequency of Application: Every 2 hours  Plan: Moisture Management: encourage regular toileting  Taken 8/12/2024 1630  Moisture Interventions:   Encourage regular toileting   Incontinence pad  Intervention: Optimize Skin Protection  Flowsheets  Taken 8/12/2024 2025  Pressure  Reduction Techniques: frequent weight shift encouraged  Pressure Reduction Devices: positioning supports utilized  Skin Protection:   transparent dressing maintained   adhesive use limited  Activity Management: activity adjusted per tolerance  Head of Bed (HOB) Positioning: HOB at 60-90 degrees  Taken 8/12/2024 1630  Pressure Reduction Techniques: frequent weight shift encouraged  Pressure Reduction Devices: positioning supports utilized  Skin Protection:   transparent dressing maintained   adhesive use limited  Activity Management: activity adjusted per tolerance  Head of Bed (HOB) Positioning: HOB at 60-90 degrees  Intervention: Promote and Optimize Oral Intake  Flowsheets  Taken 8/12/2024 2025  Oral Nutrition Promotion: rest periods promoted  Taken 8/12/2024 1630  Oral Nutrition Promotion: rest periods promoted     Problem: Gas Exchange Impaired  Goal: Optimal Gas Exchange  Outcome: Progressing  Intervention: Optimize Oxygenation and Ventilation  Flowsheets  Taken 8/12/2024 2025  Airway/Ventilation Management: airway patency maintained  Head of Bed (HOB) Positioning: HOB at 60-90 degrees  Taken 8/12/2024 1630  Airway/Ventilation Management: airway patency maintained  Head of Bed (HOB) Positioning: HOB at 60-90 degrees     Problem: Fall Injury Risk  Goal: Absence of Fall and Fall-Related Injury  Outcome: Progressing  Intervention: Identify and Manage Contributors  Flowsheets  Taken 8/12/2024 2025  Self-Care Promotion: independence encouraged  Medication Review/Management:   medications reviewed   high-risk medications identified  Taken 8/12/2024 1630  Medication Review/Management:   medications reviewed   high-risk medications identified  Intervention: Promote Injury-Free Environment  Flowsheets  Taken 8/12/2024 2025  Safety Promotion/Fall Prevention: safety round/check completed  Taken 8/12/2024 1630  Safety Promotion/Fall Prevention: safety round/check completed     Problem: COPD (Chronic Obstructive Pulmonary  Disease)  Goal: Optimal Chronic Illness Coping  Outcome: Progressing  Intervention: Support and Optimize Psychosocial Response  Flowsheets (Taken 8/12/2024 2025)  Supportive Measures:   active listening utilized   self-care encouraged  Family/Support System Care: self-care encouraged  Goal: Optimal Level of Functional [place]  Outcome: Progressing  Intervention: Optimize Functional Ability  Flowsheets  Taken 8/12/2024 2025  Self-Care Promotion: independence encouraged  Activity Management: activity adjusted per tolerance  Environmental Support: calm environment promoted  Taken 8/12/2024 1630  Activity Management: activity adjusted per tolerance  Goal: Absence of Infection Signs and Symptoms  Outcome: Progressing  Intervention: Prevent or Manage Infection  Flowsheets (Taken 8/12/2024 2025)  Fever Reduction/Comfort Measures: fluid intake increased  Goal: Improved Oral Intake  Outcome: Progressing  Intervention: Promote and Optimize Oral Intake  Flowsheets  Taken 8/12/2024 2025  Oral Nutrition Promotion: rest periods promoted  Taken 8/12/2024 1630  Oral Nutrition Promotion: rest periods promoted  Goal: Effective Oxygenation and Ventilation  Outcome: Progressing  Intervention: Promote Airway Secretion Clearance  Flowsheets  Taken 8/12/2024 2025  Breathing Techniques/Airway Clearance:   deep/controlled cough encouraged   pursed-lip breathing encouraged  Cough And Deep Breathing: done independently per patient  Activity Management: activity adjusted per tolerance  Taken 8/12/2024 1630  Cough And Deep Breathing: done independently per patient  Activity Management: activity adjusted per tolerance  Intervention: Optimize Oxygenation and Ventilation  Flowsheets  Taken 8/12/2024 2025  Airway/Ventilation Management: airway patency maintained  Fluid/Electrolyte Management: fluids provided  Head of Bed (HOB) Positioning: HOB at 60-90 degrees  Taken 8/12/2024 1630  Airway/Ventilation Management: airway patency  maintained  Head of Bed (HOB) Positioning: HOB at 60-90 degrees

## 2024-08-13 NOTE — PLAN OF CARE
Goal Outcome Evaluation:      Plan of Care Reviewed With: patient    Overall Patient Progress: no changeOverall Patient Progress: no change    Outcome Evaluation: BIpap for wob. RR 20-30s. 1x lasix and cxray done.      Problem: Adult Inpatient Plan of Care  Goal: Plan of Care Review  Description: The Plan of Care Review/Shift note should be completed every shift.  The Outcome Evaluation is a brief statement about your assessment that the patient is improving, declining, or no change.  This information will be displayed automatically on your shift  note.  Outcome: Progressing  Flowsheets (Taken 8/13/2024 0309)  Outcome Evaluation: BIpap for wob. RR 20-30s. 1x lasix and cxray done.  Plan of Care Reviewed With: patient  Overall Patient Progress: no change  Goal: Absence of Hospital-Acquired Illness or Injury  Intervention: Identify and Manage Fall Risk  Recent Flowsheet Documentation  Taken 8/13/2024 0019 by Harish Pinto RN  Safety Promotion/Fall Prevention:   treat underlying cause   toileting scheduled   safety round/check completed   supervised activity   room organization consistent  Intervention: Prevent Skin Injury  Recent Flowsheet Documentation  Taken 8/13/2024 0002 by Harish Pinto RN  Body Position:   turned   right   left     Problem: Skin Injury Risk Increased  Goal: Skin Health and Integrity  Intervention: Optimize Skin Protection  Recent Flowsheet Documentation  Taken 8/13/2024 0002 by Harish Pinto RN  Activity Management: activity minimized     Problem: Fall Injury Risk  Goal: Absence of Fall and Fall-Related Injury  Intervention: Promote Injury-Free Environment  Recent Flowsheet Documentation  Taken 8/13/2024 0019 by Harish Pinto RN  Safety Promotion/Fall Prevention:   treat underlying cause   toileting scheduled   safety round/check completed   supervised activity   room organization consistent     Problem: COPD (Chronic Obstructive Pulmonary Disease)  Goal: Optimal Level of Functional [place]  Intervention:  Optimize Functional Ability  Recent Flowsheet Documentation  Taken 8/13/2024 0002 by Harish Pinto, RN  Activity Management: activity minimized  Goal: Effective Oxygenation and Ventilation  Intervention: Promote Airway Secretion Clearance  Recent Flowsheet Documentation  Taken 8/13/2024 0002 by Harish Pinto, RN  Activity Management: activity minimized

## 2024-08-13 NOTE — PROGRESS NOTES
S: RRT called at 2250 for pt due to increased work of breathing including use of accessory muscles.   B: Prior to RRT pt sating at 89% on 6 L, albuterol neb given and pt sating at 91% on 6L oxymask. After neb patient became increasingly more diaphoretic and RR went up to 34. Pt having increased work of breathing. VS: /138, , RR 32, Sats 94% 6L oxymask  A: Respiratory status decompensating rapidly after neb. RRT called.   R: Pt placed on BiPAP, 1:1 as pt unable to remove mask, orders obtained for labs, imaging and meds. Report given to [facility] RHONDA Pinto RN.

## 2024-08-13 NOTE — PROGRESS NOTES
Responded to rapid response because patient is having increased work breathing and wheezing. Patient is currently on 3 L oxygen. In moderate distress. She has diffuse wheezes. She was put on BiPAP. Also changed steroids to IV solumedrol 40 mg q8. Further dose adjustments in AM.

## 2024-08-13 NOTE — PROGRESS NOTES
Placed on BiPAP for increased work of breathing.  Changed to [facility] status, orders placed.  Thick secretions, respiratory therapy recommending changing DuoNebs to albuterol which I have done.

## 2024-08-13 NOTE — CONSULTS
Cardiology Consultation  80 minutes in review of records, discussion with patient and family, changing medical therapy reviewing TTE      Allyson Samuel MRN# [id number]   YOB: 1945 Age: 79 year old   Date of Admission: 8/11/2024     Reason for consult: Dyspnea, CAD and abnormal TTE           Assessment and Plan:     Assessment: The patient has known coronary artery disease and the most important issue in her management is whether she is a candidate for invasive evaluation for the purpose of mechanical revascularization in addition to guideline directed lifestyle intervention and medical therapy for heart failure with reduced ejection fraction ( HFREF) /chronic coronary disease..  She provides a history consistent with stable angina and has new left ventricular dysfunction since her last echocardiogram.  Presently, she carries a DNR/DNI status, continues to smoke cigarettes, and does not follow-up with her physicians on a regular basis.  Regardless of her decision regarding invasive evaluation, we will need to complete treatment for her pneumonia/COPD exacerbation before any more cardiac testing.  In the meantime, we will institute optimal medical therapy for HREF/CAD.  I discussed treatment plans with the patient and her daughter today and they will make a decision as to whether she would consider coronary angiography at some point in the future.    Recommendations  1) Lisinopril 10 mg daily, stop amlodipine  2) increase metoprolol xl to 50 mg daily  3) empagliflozin 10 mg daily  4) spironolactone 50 mg daily   5) atorvastatin 40 mg daily  6) cessation of tobacco use  7) Mediterranean style weight loss diet /regular exercise as toleranted  8) prn NTG  9) serial BMPs  10) complete treatment for pneumonia  11) patient and family to discuss whether she wishes conservative therapy only or whether she would consider coronary angiography with revascularization. She would of course need to rescind DNR /DNI  status if she wishes invasive testing/            High complexity medical decision making  Chronic illness with severe exacerbation, progression:  Acute illness that poses a threat to life or bodily function:     High complexity data review  Unique tests ordered:   Unique results reviewed: Chest XR from  .Echo reported   Independent interpretation of a test performed by another physician: ECG from   External documents reviewed:              Chief Complaint:   Shortness of Breath           History of Present Illness:   Allyson Samuel, an 79-year-old woman with coronary artery disease, chronic obstructive pulmonary disease, type 2 diabetes mellitus, obesity, tobacco use disorder, renal cell carcinoma (status post left nephro ureterectomy) hypertension and dyslipidemia was evaluated in consultation at request of her primary care providers for left ventricular dysfunction noted on an echocardiogram    The patient has a known history of coronary artery disease.  She sustained inferior wall infarction 2003, treated with a bare-metal stent.  In 2014 she suffered a second inferior wall infarction for which she received a drug-eluting stent.  At the time of that angiogram, there was no significant disease in the left main, LAD, or circumflex.  She was last seen by my colleague Dr. White in 2018 at which time she was not taking her cardiac medications.  Dr. Alva noted she underwent a cardiac MRI with stress in 2014 that showed inferior wall scar with no significant ischemia and a well-preserved ejection fraction.  He refills her cardiac medications, advised her to stop using tobacco, and schedule follow-up, but she did not follow up.  Currently, she carries a DNR/DNI status, smokes about 1 pack/day, and has not been taking her medications on a regular basis at home.  She admits to being very sedentary and usually watches TV most the day.  She lives with her very supportive daughter Jennifer.    Her current admission was because of a  "4-day history of progressive dyspnea productive cough and malaise culminating in acute respiratory failure..  CT and chest x-ray showed no evidence of pulmonary embolus but there was left upper lobe infiltrate consistent with pneumonia, dense coronary calcification, and atherosclerotic changes involving the aorta without aneurysm.  She has been started on oxygen, antibiotics, and bronchodilators.  As part of her evaluation, she underwent an echocardiogram ( which I have personally reviewed) that shows mild to moderate reduction in left ventricular systolic performance with an ejection fraction of 45% and global hypokinesis.  Because of the patient's history of coronary disease and new left ventricular dysfunction, cardiology consultation was requested.  The patient tells me she is feeling better.  She describes a history of episodic chest pressure that lasts a few minutes and usually resolve spontaneously.  This discomfort is unchanged pattern for several years.    PAST MEDICAL HISTORY  1) left renal carcinoma: Status post left nephro ureterectomy   2) sp hysterectomy  3) essential hypertension  4) dyslipidemia  5) type 2 diabetes mellitus  6) obesity  7) DNR/DNI status  8) chronic obstructive pulmonary disease: Ongoing tobacco use disorder.  1 pack/day for several years . Pneumonia this admission on antibiotics  9) coronary artery disease: Inferior wall MI  and .  History of stent implantations in right coronary.  Last angiogram thousand 14 showing no significant narrowing in left main LAD or circumflex.    10) HFREF : TTE showing LVEF 45% this admission             Physical Exam:   Vitals were reviewed  Blood pressure (!) 149/74, pulse 94, temperature 98.5  F (36.9  C), temperature source Axillary, resp. rate 22, height 1.575 m (5' 2\"), weight 90.2 kg (198 lb 13.7 oz), SpO2 96%, not currently breastfeeding.  Temperatures:  Current - Temp: 98.5  F (36.9  C); Max - Temp  Av.2  F (36.8  C)  Min: 97.5 "  F (36.4  C)  Max: 98.7  F (37.1  C)  Respiration range: Resp  Av.7  Min: 20  Max: 35  Pulse range: Pulse  Av.9  Min: 74  Max: 120  Blood pressure range: Systolic (24hrs), Av , Min:133 , Max:196   ; Diastolic (24hrs), Av, Min:58, Max:138    Pulse oximetry range: SpO2  Av.4 %  Min: 93 %  Max: 97 %    Intake/Output Summary (Last 24 hours) at 2024 0927  Last data filed at 2024 0018  Gross per 24 hour   Intake 189 ml   Output 500 ml   Net -311 ml     Constitutional:   awake, alert, cooperative, no apparent distress, and appears stated age     Eyes:   Lids and lashes normal, pupils equal, round and reactive to light, extra ocular muscles intact, sclera clear, conjunctiva normal     Neck:   supple, symmetrical, trachea midline,      Back:   symmetric     Lungs:   Rhonci, distant breath sounds,     Cardiovascular:   Normal but distant S1 and S2 no murmur pulses symmetrical in carotid, brachial, radial, femoral, popliteal and dorsalis pedis     Abdomen:   non-tender     Musculoskeletal:   motor strength is 5 out of 5 all extremities bilaterally     Neurologic:   Grossly nonfocal     Skin:   normal skin color, texture, turgor     Additional findings:                    Past Medical History:   I have reviewed this patient's past medical history  Past Medical History:   Diagnosis Date    CAD (coronary artery disease)     - MI with PCI to RCA, 2014 inferior STEMI- instent thrombosis RCA s/p PCI    Heart attack (H)     HTN (hypertension)     Hyperlipidemia LDL goal <70     Macular degeneration     PONV (postoperative nausea and vomiting)     Tobacco use              Past Surgical History:   I have reviewed this patient's past surgical history  Past Surgical History:   Procedure Laterality Date    CARDIAC SURGERY      CYSTOSCOPY, BIOPSY BLADDER, COMBINED Right 2016    Procedure: COMBINED CYSTOSCOPY, BIOPSY BLADDER;  Surgeon: Forest Jim MD;  Location: [facility]     CYSTOSCOPY, REMOVE STENT(S), COMBINED Left 4/14/2015    Procedure: COMBINED CYSTOSCOPY, REMOVE STENT(S);  Surgeon: Forest Jim MD;  Location: [facility]    CYSTOSCOPY, RETROGRADES, COMBINED Left 10/21/2015    Procedure: COMBINED CYSTOSCOPY, RETROGRADES;  Surgeon: Forest Jim MD;  Location: [facility]    CYSTOSCOPY, RETROGRADES, COMBINED Right 5/4/2017    Procedure: COMBINED CYSTOSCOPY, RETROGRADES;  Cystoscopy,Right Retrograde Pyelogram;  Surgeon: Forest Jim MD;  Location: [facility]    CYSTOSCOPY, RETROGRADES, INSERT STENT URETER(S), COMBINED Left 3/12/2015    Procedure: COMBINED CYSTOSCOPY, RETROGRADES, INSERT STENT URETER(S);  Surgeon: Forest Jim MD;  Location: [facility]    DAVINCI NEPHROURETERECTOMY Left 4/14/2015    Procedure: DAVINCI NEPHROURETERECTOMY;  Surgeon: Forest Jim MD;  Location: [facility]    GYN SURGERY      HYSTERECTOMY      age 40, fibroids. Still has right ovary               Social History:   I have reviewed this patient's social history  Social History     Tobacco Use    Smoking status: Every Day     Current packs/day: 0.10     Types: Cigarettes    Smokeless tobacco: Never    Tobacco comments:     Trying hard to quit 9/24/15.  smokes about 2-3 cig per day   Substance Use Topics    Alcohol use: Yes     Comment: few drinks per month             Family History:   I have reviewed this patient's family history  Family History   Problem Relation Age of Onset    Breast Cancer Mother     Cerebrovascular Disease Father     Heart Disease Brother         pacemaker             Allergies:     Allergies   Allergen Reactions    Latex Rash    Bees     Macrobid [Nitrofurantoin] Nausea             Medications:   I have reviewed this patient's current medications  Medications Prior to Admission   Medication Sig Dispense Refill Last Dose    acetaminophen 650 MG TABS Take 650 mg by mouth every 6 hours 100 tablet 0 Unknown    aspirin 81 MG tablet Take 1  tablet (81 mg) by mouth daily 30 tablet  8/10/2024     Current Facility-Administered Medications   Medication Dose Route Frequency Provider Last Rate Last Admin    acetaminophen (TYLENOL) tablet 650 mg  650 mg Oral Q4H PRN Edel Wong MD        Or    acetaminophen (TYLENOL) Suppository 650 mg  650 mg Rectal Q4H PRN Edel Wong MD        acetylcysteine (MUCOMYST) 10 % nebulizer solution 4 mL  4 mL Nebulization 4x Daily Nba Mello MD   4 mL at 08/13/24 0804    albuterol (PROVENTIL) neb solution 2.5 mg  2.5 mg Nebulization Q2H PRN Nba Mello MD   2.5 mg at 08/13/24 0618    amLODIPine (NORVASC) tablet 5 mg  5 mg Oral Daily Nba Mello MD   5 mg at 08/13/24 0924    aspirin EC tablet 81 mg  81 mg Oral Daily Nba Mello MD   81 mg at 08/13/24 0924    azithromycin (ZITHROMAX) tablet 250 mg  250 mg Oral Daily Edel Wong MD   250 mg at 08/13/24 0924    calcium carbonate (TUMS) chewable tablet 1,000 mg  1,000 mg Oral 4x Daily PRN Edel Wong MD        carboxymethylcellulose PF (REFRESH PLUS) 0.5 % ophthalmic solution 1 drop  1 drop Both Eyes Q1H PRN Placido Forbes MD        cefTRIAXone (ROCEPHIN) 2 g vial to attach to  ml bag for ADULTS or NS 50 ml bag for PEDS  2 g Intravenous Q24H Edel Wong  mL/hr at 08/12/24 2000 2 g at 08/12/24 2000    glucose gel 15-30 g  15-30 g Oral Q15 Min PRN Mihir Kay MD        Or    dextrose 50 % injection 25-50 mL  25-50 mL Intravenous Q15 Min PRN Mihir Kay MD        Or    glucagon injection 1 mg  1 mg Subcutaneous Q15 Min PRN Mihir Kay MD        enoxaparin ANTICOAGULANT (LOVENOX) injection 40 mg  40 mg Subcutaneous Q24H Edel Wong MD   40 mg at 08/13/24 0924    hydrALAZINE (APRESOLINE) injection 10 mg  10 mg Intravenous Q4H PRN Mihir Kay MD   10 mg at 08/12/24 7580    hydrOXYzine HCl (ATARAX) tablet 10 mg  10 mg Oral TID PRN Mihir Kay MD        insulin aspart  (NovoLOG) injection (RAPID ACTING)  1-7 Units Subcutaneous TID AC Mihir Kay MD        insulin aspart (NovoLOG) injection (RAPID ACTING)  1-5 Units Subcutaneous At Bedtime Mihir Kay MD        ipratropium - albuterol 0.5 mg/2.5 mg/3 mL (DUONEB) neb solution 3 mL  3 mL Nebulization 4x daily Nba Mello MD   3 mL at 08/13/24 0804    lidocaine (LMX4) cream   Topical Q1H PRN Placido Forbes MD        lidocaine 1 % 0.1-1 mL  0.1-1 mL Other Q1H PRN Placido Forbes MD        metoprolol succinate ER (TOPROL XL) 24 hr tablet 25 mg  25 mg Oral Daily Nba Mello MD   25 mg at 08/13/24 0924    No lozenges or gum should be given while patient on BIPAP/AVAPS/AVAPS AE   Does not apply Continuous PRN Placido Forbes MD        ondansetron (ZOFRAN ODT) ODT tab 4 mg  4 mg Oral Q6H PRN Edel Wong MD        Or    ondansetron (ZOFRAN) injection 4 mg  4 mg Intravenous Q6H PRN Edel Wong MD        Patient may continue current oral medications   Does not apply Continuous PRN Placido Forbes MD        [START ON 8/14/2024] predniSONE (DELTASONE) tablet 60 mg  60 mg Oral Daily Nba Mello MD        senna-docusate (SENOKOT-S/PERICOLACE) 8.6-50 MG per tablet 1 tablet  1 tablet Oral BID PRN Edel Wong MD        Or    senna-docusate (SENOKOT-S/PERICOLACE) 8.6-50 MG per tablet 2 tablet  2 tablet Oral BID PRN Edel Wong MD        sodium chloride (PF) 0.9% PF flush 3 mL  3 mL Intracatheter Q8H Placido Forbes MD        sodium chloride (PF) 0.9% PF flush 3 mL  3 mL Intracatheter q1 min prn Placido Forbes MD        sodium chloride (PF) 0.9% PF flush 3 mL  3 mL Intracatheter Q8H Edel Wong MD   3 mL at 08/13/24 0925    sodium chloride (PF) 0.9% PF flush 3 mL  3 mL Intracatheter q1 min prn Edel Wong MD                 Review of Systems:     The 10 point Review of Systems is negative other than noted in the HPI            Data:   All  laboratory data reviewed  Results for orders placed or performed during the hospital encounter of 08/11/24 (from the past 24 hour(s))   Lactic Acid Whole Blood w/ 1x repeat in 2 hrs when >2   Result Value Ref Range    Lactic Acid, Initial 2.3 (H) 0.7 - 2.0 mmol/L   Lactic acid whole blood   Result Value Ref Range    Lactic Acid 1.4 0.7 - 2.0 mmol/L   Glucose by meter   Result Value Ref Range    GLUCOSE BY METER POCT 140 (H) 70 - 99 mg/dL   Blood gas venous   Result Value Ref Range    pH Venous 7.21 (L) 7.32 - 7.43    pCO2 Venous 48 40 - 50 mm Hg    pO2 Venous 103 (H) 25 - 47 mm Hg    Bicarbonate Venous 19 (L) 21 - 28 mmol/L    Base Excess/Deficit Venous -8.8 (L) -3.0 - 3.0 mmol/L    FIO2 40     Oxyhemoglobin Venous 97 (H) 70 - 75 %    O2 Sat, Venous 97.9 (H) 70.0 - 75.0 %    Narrative    In healthy individuals, oxyhemoglobin (O2Hb) and oxygen saturation (SO2) are approximately equal. In the presence of dyshemoglobins, oxyhemoglobin can be considerably lower than oxygen saturation.   XR Chest Port 1 View    Narrative    EXAM: XR CHEST PORT 1 VIEW  LOCATION: [facility]  DATE: 8/12/2024    INDICATION: worsening hypoxia  COMPARISON: 8/11/2024      Impression    IMPRESSION: Unchanged opacification of the left lung base. No pleural effusion. No pneumothorax. The cardiac mediastinal silhouette is within normal limits. Calcified aortic knob. The visualized osseous structures are intact.   Glucose by meter   Result Value Ref Range    GLUCOSE BY METER POCT 174 (H) 70 - 99 mg/dL   Basic metabolic panel   Result Value Ref Range    Sodium 132 (L) 135 - 145 mmol/L    Potassium 4.1 3.4 - 5.3 mmol/L    Chloride 100 98 - 107 mmol/L    Carbon Dioxide (CO2) 20 (L) 22 - 29 mmol/L    Anion Gap 12 7 - 15 mmol/L    Urea Nitrogen 23.6 (H) 8.0 - 23.0 mg/dL    Creatinine 1.20 (H) 0.51 - 0.95 mg/dL    GFR Estimate 45 (L) >60 mL/min/1.73m2    Calcium 8.9 8.8 - 10.4 mg/dL    Glucose 189 (H) 70 - 99 mg/dL   CBC with  platelets   Result Value Ref Range    WBC Count 13.2 (H) 4.0 - 11.0 10e3/uL    RBC Count 4.56 3.80 - 5.20 10e6/uL    Hemoglobin 13.6 11.7 - 15.7 g/dL    Hematocrit 41.4 35.0 - 47.0 %    MCV 91 78 - 100 fL    MCH 29.8 26.5 - 33.0 pg    MCHC 32.9 31.5 - 36.5 g/dL    RDW 15.1 (H) 10.0 - 15.0 %    Platelet Count 150 150 - 450 10e3/uL   Ionized Calcium   Result Value Ref Range    Calcium Ionized Whole Blood 4.6 4.4 - 5.2 mg/dL   Blood gas venous   Result Value Ref Range    pH Venous 7.29 (L) 7.32 - 7.43    pCO2 Venous 47 40 - 50 mm Hg    pO2 Venous 50 (H) 25 - 47 mm Hg    Bicarbonate Venous 23 21 - 28 mmol/L    Base Excess/Deficit Venous -4.1 (L) -3.0 - 3.0 mmol/L    FIO2 5     Oxyhemoglobin Venous 83 (H) 70 - 75 %    O2 Sat, Venous 84.0 (H) 70.0 - 75.0 %    Narrative    In healthy individuals, oxyhemoglobin (O2Hb) and oxygen saturation (SO2) are approximately equal. In the presence of dyshemoglobins, oxyhemoglobin can be considerably lower than oxygen saturation.   Glucose by meter   Result Value Ref Range    GLUCOSE BY METER POCT 187 (H) 70 - 99 mg/dL       Lab Results   Component Value Date    CHOL 253 11/16/2018     Lab Results   Component Value Date    HDL 67 11/16/2018     Lab Results   Component Value Date     11/16/2018     Lab Results   Component Value Date    TRIG 174 11/16/2018     Lab Results   Component Value Date    CHOLHDLRATIO 2.9 08/07/2015     TSH   Date Value Ref Range Status   08/11/2024 1.10 0.30 - 4.20 uIU/mL Final   08/07/2015 1.08 0.40 - 4.00 mU/L Final         EKG results: NSR IVCD  LAD no acute change    Echocardiology: LVEF 45% global hypokinesis no significant valve diseae    Cardiac stress testing: NA    Cardiac angiography: reviewed results from 2014  sp PCI RCA  no significant disease in LMCA, LAD or CX at that time    Clinically Significant Risk Factors          # Hypocalcemia: Lowest Ca = 8.6 mg/dL in last 2 days, will monitor and replace as appropriate         # Hypertension:  "Noted on problem list           # Obesity: Estimated body mass index is 36.37 kg/m  as calculated from the following:    Height as of this encounter: 1.575 m (5' 2\").    Weight as of this encounter: 90.2 kg (198 lb 13.7 oz)., PRESENT ON ADMISSION                       Adonay Gipson MD on 8/13/2024 at 9:27 AM    "

## 2024-08-13 NOTE — PROGRESS NOTES
"Respiratory Care Note:    RRT called for increased WOB. Pt placed on BIPAP and [place] [facility]     A BiPAP of  14/7 @ 40% was applied to the pt via the mask for an increase in WOB and/or SOB.  The bridge of the nose looks good and remains intact. Pt is tolerating it well. Will continue to monitor and assess the pt's current respiratory status and needs.    BP (!) 144/65 (BP Location: Right arm)   Pulse 86   Temp 98.1  F (36.7  C) (Axillary)   Resp 22   Ht 1.575 m (5' 2\")   Wt 90.2 kg (198 lb 13.7 oz)   SpO2 96%   BMI 36.37 kg/m      Luther Moreno, RT on 8/13/2024 at 4:34 AM      "

## 2024-08-13 NOTE — PROGRESS NOTES
"[facility]    Medicine Progress Note - Hospitalist Service    Date of Admission:  8/11/2024    Assessment & Plan   Allyson Samuel is a 79 year old female who came to attention on  on 8/11/2024 due to \"feeling sick\" and SOB for several days PTA.  She was found to be in acute hypoxic resp failure and was admitted with severe sepsis and acute hypoxic and hypercapnic resp failure due to pneumonia.    Significant PMH includes tobacco abuse without a formal dx of COPD, CAD (hx MI and stenting in 2003), HTN, Macular degeneration.  She underwent nephrectomy in 2015 for urothelial cancer. The pt apparently does not follow regularly with medical care since about 2019.    Imaging: left lower lobe infiltrate with air bronchograms consistent with pneumonia. Otherwise, pt with evident emphysematous changes throughout both lungs.      DX:  Acute hypoxic and hypercapnic resp failure in the setting of evident emphysematous changes in both lungs and evident left lower lobe infiltrate.  Initialy VBG showed pH 7.11 with pCO2 62.    Severe sepsis with lactic acidosis, leukocytosis, mild CYNTHIA.   HFrEF.  Elevated BNP, EF measured at 48%.  Noted to have a run of Ventricular Ectopy overnight.   Ventricular ectopy v aberrantly conducted a fib. Documented at 0141 on telemetry.   Elevated troponin thought to be due to strain in the setting of acute infection.   Mild CYNTHIA with electrolyte disturbances. Hyponatremia, low bicarbonate.   HTN.    DM II. Hyperglycemia exacerbated by steroid exposure.       PLAN:  1.  Cont with empiric Ceftriaxone and Azithromycin. Empirically start prednisone 40 g qd.   2.  Pulmonary toilet. Start mucomyst nebs.   3.  Wean as able.  4.  Start antihypertensives. (Resumed metoprolol XL 25 mg daily; start amlodipine) Should also start lisinopril before discharge.   5.  Given pt's report of SANCHEZ/chest discomfort, will consider stress testing. She would likely not be a good candidate for anything beyond " "medical therapy, but it would be worthwhile to know if she has symptomatic CAD.   6.  consider low dose diuretic.   7.  Due to the ectopy and concern for underlying CAD with new worsened EF, will ask for cards to be involved.           Diet: NPO for Medical/Clinical Reasons Except for: Meds    DVT Prophylaxis: Enoxaparin (Lovenox) SQ  Prado Catheter: Not present  Lines: None     Cardiac Monitoring: ACTIVE order. Indication: imc.  bipap respiratory failure  Code Status: No CPR- Do NOT Intubate      Clinically Significant Risk Factors          # Hypocalcemia: Lowest Ca = 8.6 mg/dL in last 2 days, will monitor and replace as appropriate         # Hypertension: Noted on problem list           # Obesity: Estimated body mass index is 36.37 kg/m  as calculated from the following:    Height as of this encounter: 1.575 m (5' 2\").    Weight as of this encounter: 90.2 kg (198 lb 13.7 oz)., PRESENT ON ADMISSION                  Disposition Plan     Medically Ready for Discharge: Anticipated in 2-4 Days      Nba Mello MD  Hospitalist Service  [facility]  Securely message with [employer] (more info)  Text page via [facility] Paging/Directory   ______________________________________________________________________    Interval History   Pt required resumption of BiPAP overnight. Was given an extra dose of Solumedrol and started on IMC.     I was contacted this am with report of non-sustained episode of apparent ventricular ectopy. Chart review indicates that this was also presnet at the time of admission and was more sustained at that time.     Feeling much improved today.     Physical Exam   Vital Signs: Temp: 98.5  F (36.9  C) Temp src: Axillary BP: (!) 148/72 Pulse: 74   Resp: 22 SpO2: 96 % O2 Device: Oxymask Oxygen Delivery: 5 LPM  Weight: 198 lbs 13.68 oz    General Appearance: Alert and comfortable breathing 5 LPM by NC.  Respiratory: distant breath sounds are coarse  Cardiovascular: RRR without " murmur  GI: soft, obese  Skin: no lesions. Trace edema.  Other:      Medical Decision Making       40 MINUTES SPENT BY ME on the date of service doing chart review, history, exam, documentation & further activities per the note.      Data   Results for orders placed or performed during the hospital encounter of 08/11/24 (from the past 24 hour(s))   Lactic Acid Whole Blood w/ 1x repeat in 2 hrs when >2   Result Value Ref Range    Lactic Acid, Initial 2.3 (H) 0.7 - 2.0 mmol/L   Lactic acid whole blood   Result Value Ref Range    Lactic Acid 1.4 0.7 - 2.0 mmol/L   Glucose by meter   Result Value Ref Range    GLUCOSE BY METER POCT 140 (H) 70 - 99 mg/dL   Blood gas venous   Result Value Ref Range    pH Venous 7.21 (L) 7.32 - 7.43    pCO2 Venous 48 40 - 50 mm Hg    pO2 Venous 103 (H) 25 - 47 mm Hg    Bicarbonate Venous 19 (L) 21 - 28 mmol/L    Base Excess/Deficit Venous -8.8 (L) -3.0 - 3.0 mmol/L    FIO2 40     Oxyhemoglobin Venous 97 (H) 70 - 75 %    O2 Sat, Venous 97.9 (H) 70.0 - 75.0 %    Narrative    In healthy individuals, oxyhemoglobin (O2Hb) and oxygen saturation (SO2) are approximately equal. In the presence of dyshemoglobins, oxyhemoglobin can be considerably lower than oxygen saturation.   XR Chest Port 1 View    Narrative    EXAM: XR CHEST PORT 1 VIEW  LOCATION: [facility]  DATE: 8/12/2024    INDICATION: worsening hypoxia  COMPARISON: 8/11/2024      Impression    IMPRESSION: Unchanged opacification of the left lung base. No pleural effusion. No pneumothorax. The cardiac mediastinal silhouette is within normal limits. Calcified aortic knob. The visualized osseous structures are intact.   Glucose by meter   Result Value Ref Range    GLUCOSE BY METER POCT 174 (H) 70 - 99 mg/dL   Basic metabolic panel   Result Value Ref Range    Sodium 132 (L) 135 - 145 mmol/L    Potassium 4.1 3.4 - 5.3 mmol/L    Chloride 100 98 - 107 mmol/L    Carbon Dioxide (CO2) 20 (L) 22 - 29 mmol/L    Anion Gap 12 7 - 15  mmol/L    Urea Nitrogen 23.6 (H) 8.0 - 23.0 mg/dL    Creatinine 1.20 (H) 0.51 - 0.95 mg/dL    GFR Estimate 45 (L) >60 mL/min/1.73m2    Calcium 8.9 8.8 - 10.4 mg/dL    Glucose 189 (H) 70 - 99 mg/dL   CBC with platelets   Result Value Ref Range    WBC Count 13.2 (H) 4.0 - 11.0 10e3/uL    RBC Count 4.56 3.80 - 5.20 10e6/uL    Hemoglobin 13.6 11.7 - 15.7 g/dL    Hematocrit 41.4 35.0 - 47.0 %    MCV 91 78 - 100 fL    MCH 29.8 26.5 - 33.0 pg    MCHC 32.9 31.5 - 36.5 g/dL    RDW 15.1 (H) 10.0 - 15.0 %    Platelet Count 150 150 - 450 10e3/uL   Ionized Calcium   Result Value Ref Range    Calcium Ionized Whole Blood 4.6 4.4 - 5.2 mg/dL   Magnesium   Result Value Ref Range    Magnesium 2.0 1.7 - 2.3 mg/dL   Phosphorus   Result Value Ref Range    Phosphorus 3.6 2.5 - 4.5 mg/dL   Blood gas venous   Result Value Ref Range    pH Venous 7.29 (L) 7.32 - 7.43    pCO2 Venous 47 40 - 50 mm Hg    pO2 Venous 50 (H) 25 - 47 mm Hg    Bicarbonate Venous 23 21 - 28 mmol/L    Base Excess/Deficit Venous -4.1 (L) -3.0 - 3.0 mmol/L    FIO2 5     Oxyhemoglobin Venous 83 (H) 70 - 75 %    O2 Sat, Venous 84.0 (H) 70.0 - 75.0 %    Narrative    In healthy individuals, oxyhemoglobin (O2Hb) and oxygen saturation (SO2) are approximately equal. In the presence of dyshemoglobins, oxyhemoglobin can be considerably lower than oxygen saturation.   Glucose by meter   Result Value Ref Range    GLUCOSE BY METER POCT 187 (H) 70 - 99 mg/dL   Glucose by meter   Result Value Ref Range    GLUCOSE BY METER POCT 146 (H) 70 - 99 mg/dL

## 2024-08-13 NOTE — PROVIDER NOTIFICATION
08/12/24 1913   RCAT Assessment   Reason for Assessment Other (see comments)  (PNA)   Pulmonary Status 3   Surgical Status 0   Chest X-ray 3   Respiratory Pattern 0   Mental Status 0   Breath Sounds 4   Cough Effectiveness 1   Level of Activity 1   O2 Required for SpO2>=92% 1   Acuity Level (points) 13   Acuity Level  3   Re-eval Interval Guideline Every 3 days   Re-evaluation Date 08/15/24   Clinical Indications/Symptoms   Aerosol Therapy RCAT protocol   Broncho-pulmonary Hygiene Productive cough   Volume Expansion Prevent atelectasis   Aerosol Therapy Plan   RT Treatment Nebulizer   Anticholinergic/Beta-Andrenergic Agonist Duoneb soln (0.5mg/3mg per 3mL) neb Max 6 doses/24h   Aerosol Treatment Frequency Acuity Level 3: QID/PRN @noc-Mod wheezing/Hx asthma/secretion removal   Aerosol Therapy (SVN)   Respiratory Treatment Status (SVN) given   Patient Position HOB elevated   Posttreatment Assessment (SVN) increased aeration   Broncho-Pulmonary Hygiene Plan   Broncho-Pulmonary Hygiene Treatment Coughing techniques   Broncho-Pulm Hygiene Frequency Acuity Level 3: TID-Small amounts secretions/poor cough, hx of secretions   Volume Expansion Plan   Volume Expansion Treatment Incentive Spirometer   Volume Expansion Frequency Acuity Level 3: TID-At risk for developing atelectasis     Education was performed with the patient in regards to indications/benefits and possible side effects of bronchodilators. Will continue to do education with patient.     Luther Moreno, RT on 8/12/2024 at 10:04 PM

## 2024-08-14 LAB
ALBUMIN SERPL BCG-MCNC: 3.8 G/DL (ref 3.5–5.2)
ALP SERPL-CCNC: 78 U/L (ref 40–150)
ALT SERPL W P-5'-P-CCNC: 20 U/L (ref 0–50)
ANION GAP SERPL CALCULATED.3IONS-SCNC: 12 MMOL/L (ref 7–15)
AST SERPL W P-5'-P-CCNC: 24 U/L (ref 0–45)
BILIRUB DIRECT SERPL-MCNC: <0.2 MG/DL (ref 0–0.3)
BILIRUB SERPL-MCNC: 0.2 MG/DL
BUN SERPL-MCNC: 30.9 MG/DL (ref 8–23)
CALCIUM SERPL-MCNC: 9.4 MG/DL (ref 8.8–10.4)
CHLORIDE SERPL-SCNC: 103 MMOL/L (ref 98–107)
CREAT SERPL-MCNC: 1.21 MG/DL (ref 0.51–0.95)
EGFRCR SERPLBLD CKD-EPI 2021: 45 ML/MIN/1.73M2
GLUCOSE BLDC GLUCOMTR-MCNC: 116 MG/DL (ref 70–99)
GLUCOSE BLDC GLUCOMTR-MCNC: 126 MG/DL (ref 70–99)
GLUCOSE BLDC GLUCOMTR-MCNC: 141 MG/DL (ref 70–99)
GLUCOSE BLDC GLUCOMTR-MCNC: 173 MG/DL (ref 70–99)
GLUCOSE BLDC GLUCOMTR-MCNC: 191 MG/DL (ref 70–99)
GLUCOSE SERPL-MCNC: 111 MG/DL (ref 70–99)
HCO3 SERPL-SCNC: 22 MMOL/L (ref 22–29)
MAGNESIUM SERPL-MCNC: 2.2 MG/DL (ref 1.7–2.3)
PHOSPHATE SERPL-MCNC: 3.3 MG/DL (ref 2.5–4.5)
POTASSIUM SERPL-SCNC: 4.7 MMOL/L (ref 3.4–5.3)
PROT SERPL-MCNC: 6.8 G/DL (ref 6.4–8.3)
SODIUM SERPL-SCNC: 137 MMOL/L (ref 135–145)

## 2024-08-14 PROCEDURE — 82248 BILIRUBIN DIRECT: CPT | Performed by: INTERNAL MEDICINE

## 2024-08-14 PROCEDURE — 250N000012 HC RX MED GY IP 250 OP 636 PS 637: Performed by: INTERNAL MEDICINE

## 2024-08-14 PROCEDURE — 250N000011 HC RX IP 250 OP 636: Performed by: INTERNAL MEDICINE

## 2024-08-14 PROCEDURE — 250N000009 HC RX 250: Performed by: INTERNAL MEDICINE

## 2024-08-14 PROCEDURE — 99231 SBSQ HOSP IP/OBS SF/LOW 25: CPT | Performed by: INTERNAL MEDICINE

## 2024-08-14 PROCEDURE — 99232 SBSQ HOSP IP/OBS MODERATE 35: CPT | Performed by: INTERNAL MEDICINE

## 2024-08-14 PROCEDURE — 80048 BASIC METABOLIC PNL TOTAL CA: CPT | Performed by: INTERNAL MEDICINE

## 2024-08-14 PROCEDURE — 250N000013 HC RX MED GY IP 250 OP 250 PS 637: Performed by: INTERNAL MEDICINE

## 2024-08-14 PROCEDURE — 83735 ASSAY OF MAGNESIUM: CPT | Performed by: INTERNAL MEDICINE

## 2024-08-14 PROCEDURE — 80053 COMPREHEN METABOLIC PANEL: CPT | Performed by: INTERNAL MEDICINE

## 2024-08-14 PROCEDURE — 999N000157 HC STATISTIC RCP TIME EA 10 MIN

## 2024-08-14 PROCEDURE — 36415 COLL VENOUS BLD VENIPUNCTURE: CPT | Performed by: INTERNAL MEDICINE

## 2024-08-14 PROCEDURE — 94640 AIRWAY INHALATION TREATMENT: CPT | Mod: 76

## 2024-08-14 PROCEDURE — 120N000001 HC R&B MED SURG/OB

## 2024-08-14 PROCEDURE — 84100 ASSAY OF PHOSPHORUS: CPT | Performed by: INTERNAL MEDICINE

## 2024-08-14 RX ORDER — ALBUTEROL SULFATE 0.83 MG/ML
2.5 SOLUTION RESPIRATORY (INHALATION) 4 TIMES DAILY
Status: DISCONTINUED | OUTPATIENT
Start: 2024-08-14 | End: 2024-08-17 | Stop reason: HOSPADM

## 2024-08-14 RX ORDER — LISINOPRIL 10 MG/1
10 TABLET ORAL DAILY
Status: DISCONTINUED | OUTPATIENT
Start: 2024-08-14 | End: 2024-08-17

## 2024-08-14 RX ORDER — METOPROLOL SUCCINATE 50 MG/1
50 TABLET, EXTENDED RELEASE ORAL DAILY
Status: DISCONTINUED | OUTPATIENT
Start: 2024-08-14 | End: 2024-08-17 | Stop reason: HOSPADM

## 2024-08-14 RX ORDER — SPIRONOLACTONE 25 MG/1
25 TABLET ORAL DAILY
Status: DISCONTINUED | OUTPATIENT
Start: 2024-08-14 | End: 2024-08-17 | Stop reason: HOSPADM

## 2024-08-14 RX ORDER — ATORVASTATIN CALCIUM 40 MG/1
40 TABLET, FILM COATED ORAL EVERY EVENING
Status: DISCONTINUED | OUTPATIENT
Start: 2024-08-14 | End: 2024-08-17 | Stop reason: HOSPADM

## 2024-08-14 RX ADMIN — SPIRONOLACTONE 25 MG: 25 TABLET, FILM COATED ORAL at 09:12

## 2024-08-14 RX ADMIN — ALBUTEROL SULFATE 2.5 MG: 2.5 SOLUTION RESPIRATORY (INHALATION) at 20:22

## 2024-08-14 RX ADMIN — ACETYLCYSTEINE 4 ML: 100 SOLUTION ORAL; RESPIRATORY (INHALATION) at 07:32

## 2024-08-14 RX ADMIN — ALBUTEROL SULFATE 2.5 MG: 2.5 SOLUTION RESPIRATORY (INHALATION) at 11:35

## 2024-08-14 RX ADMIN — IPRATROPIUM BROMIDE AND ALBUTEROL SULFATE 3 ML: .5; 3 SOLUTION RESPIRATORY (INHALATION) at 07:32

## 2024-08-14 RX ADMIN — AZITHROMYCIN DIHYDRATE 250 MG: 250 TABLET ORAL at 09:13

## 2024-08-14 RX ADMIN — ALBUTEROL SULFATE 2.5 MG: 2.5 SOLUTION RESPIRATORY (INHALATION) at 06:12

## 2024-08-14 RX ADMIN — ALBUTEROL SULFATE 2.5 MG: 2.5 SOLUTION RESPIRATORY (INHALATION) at 15:19

## 2024-08-14 RX ADMIN — ACETYLCYSTEINE 4 ML: 100 SOLUTION ORAL; RESPIRATORY (INHALATION) at 20:22

## 2024-08-14 RX ADMIN — ACETYLCYSTEINE 4 ML: 100 SOLUTION ORAL; RESPIRATORY (INHALATION) at 11:35

## 2024-08-14 RX ADMIN — PREDNISONE 60 MG: 20 TABLET ORAL at 09:13

## 2024-08-14 RX ADMIN — ENOXAPARIN SODIUM 40 MG: 40 INJECTION SUBCUTANEOUS at 09:12

## 2024-08-14 RX ADMIN — ASPIRIN 81 MG: 81 TABLET, COATED ORAL at 09:13

## 2024-08-14 RX ADMIN — METOPROLOL SUCCINATE 50 MG: 50 TABLET, EXTENDED RELEASE ORAL at 09:13

## 2024-08-14 RX ADMIN — CEFTRIAXONE 2 G: 2 INJECTION, POWDER, FOR SOLUTION INTRAMUSCULAR; INTRAVENOUS at 19:52

## 2024-08-14 RX ADMIN — LISINOPRIL 10 MG: 10 TABLET ORAL at 09:13

## 2024-08-14 RX ADMIN — ACETYLCYSTEINE 4 ML: 100 SOLUTION ORAL; RESPIRATORY (INHALATION) at 15:19

## 2024-08-14 ASSESSMENT — ACTIVITIES OF DAILY LIVING (ADL)
ADLS_ACUITY_SCORE: 39
ADLS_ACUITY_SCORE: 35
ADLS_ACUITY_SCORE: 35
ADLS_ACUITY_SCORE: 39
ADLS_ACUITY_SCORE: 35
ADLS_ACUITY_SCORE: 35
ADLS_ACUITY_SCORE: 39
ADLS_ACUITY_SCORE: 35
ADLS_ACUITY_SCORE: 39
ADLS_ACUITY_SCORE: 39
ADLS_ACUITY_SCORE: 35
ADLS_ACUITY_SCORE: 39
ADLS_ACUITY_SCORE: 35
ADLS_ACUITY_SCORE: 35
ADLS_ACUITY_SCORE: 39
ADLS_ACUITY_SCORE: 35
ADLS_ACUITY_SCORE: 39
ADLS_ACUITY_SCORE: 35

## 2024-08-14 NOTE — PLAN OF CARE
Goal Outcome Evaluation:    A/ox4. Denies pain, dizziness or n/v. SANCHEZ. Continue Abx. Weaned from 5L to 2L oxymask. LS wheezy exp, prn neb given.       Plan of Care Reviewed With: patient, family    Overall Patient Progress: improvingOverall Patient Progress: improving    Outcome Evaluation: Stated feeling better. Weaned O2 as able.      Problem: Adult Inpatient Plan of Care  Goal: Plan of Care Review  Description: The Plan of Care Review/Shift note should be completed every shift.  The Outcome Evaluation is a brief statement about your assessment that the patient is improving, declining, or no change.  This information will be displayed automatically on your shift  note.  Outcome: Progressing  Flowsheets (Taken 8/14/2024 0239)  Outcome Evaluation: Stated feeling better. Weaned O2 as able.  Plan of Care Reviewed With:   patient   family  Overall Patient Progress: improving  Goal: Absence of Hospital-Acquired Illness or Injury  Intervention: Identify and Manage Fall Risk  Recent Flowsheet Documentation  Taken 8/13/2024 2315 by Harish Pinto, RN  Safety Promotion/Fall Prevention:   treat underlying cause   toileting scheduled   supervised activity   safety round/check completed   room organization consistent   room near nurse's station  Taken 8/13/2024 2012 by Harish Pinto, RN  Safety Promotion/Fall Prevention:   treat underlying cause   toileting scheduled   supervised activity   safety round/check completed   room organization consistent   room near nurse's station     Problem: Skin Injury Risk Increased  Goal: Skin Health and Integrity  Intervention: Optimize Skin Protection  Recent Flowsheet Documentation  Taken 8/13/2024 2315 by Harish Pinto, RN  Activity Management: activity adjusted per tolerance  Taken 8/13/2024 2012 by Harish Pinto, RN  Activity Management: activity adjusted per tolerance     Problem: Fall Injury Risk  Goal: Absence of Fall and Fall-Related Injury  Intervention: Promote Injury-Free Environment  Recent Flowsheet  Documentation  Taken 8/13/2024 2315 by Harish Pinto RN  Safety Promotion/Fall Prevention:   treat underlying cause   toileting scheduled   supervised activity   safety round/check completed   room organization consistent   room near nurse's station  Taken 8/13/2024 2012 by Harish Pinto RN  Safety Promotion/Fall Prevention:   treat underlying cause   toileting scheduled   supervised activity   safety round/check completed   room organization consistent   room near nurse's station     Problem: COPD (Chronic Obstructive Pulmonary Disease)  Goal: Optimal Level of Functional [place]  Intervention: Optimize Functional Ability  Recent Flowsheet Documentation  Taken 8/13/2024 2315 by Harish Pinto, RN  Activity Management: activity adjusted per tolerance  Taken 8/13/2024 2012 by Harish Pinto RN  Activity Management: activity adjusted per tolerance  Goal: Effective Oxygenation and Ventilation  Intervention: Promote Airway Secretion Clearance  Recent Flowsheet Documentation  Taken 8/13/2024 2315 by Harish Pinto RN  Cough And Deep Breathing: done independently per patient  Activity Management: activity adjusted per tolerance  Taken 8/13/2024 2012 by Harish Pinto RN  Cough And Deep Breathing: done independently per patient  Activity Management: activity adjusted per tolerance

## 2024-08-14 NOTE — PROGRESS NOTES
Cardiology Progress Note  Renata RENDON, CNP          Assessment and Plan:     Admit (8/11/24) via EMS due to 3 days worsening SOB, cough  Been off medications for some time  Evidence of hypoxemic respiratory failure, sepsis, COPD exacerbation, pneumonia  New decline in LVEF prompting cardiology involvement    PMH:  Coronary artery disease, COPD, type II diabetes, tobacco dependence, renal cell carcinoma s/p nephrectomy, hypertension, hyperlipidemia, noncompliance      DNR/DNI    COPD exacerbation  Community-acquired Pneumonia  Sepsis  -improved respiratory status  -nebs, abx, steroids    New Cardiomyopathy  -BNP: 3100 / no pulmonary vascular congestion  -LVEF 48% with global hypokinesis  (was 57% in 2014)  -etiology unclear:  ischemia suspected  -no heart failure symptoms    Coronary Artery Disease:  -hx of inferior MI and stenting (2003) and again in 2014  -known inferior scar per stress cMRI (2018)  -off all cardiovascular meds for some time  -troponin 57 / no significant chest pain / no significant ST abnormalities--> doubt ACS  -stable chronic angina for several years   -at this point, patient prefers conservative medical management and is deferring coronary angiogram  -ASA, statin, beta-blocker started      Hypertension:  -BP much improved with Metoprolol and ACE-I    Hyperlipidemia:  -Atorvastatin started   -repeat lipids in 2-3 months    Tobacco Dependence  -encouraged cessation    Stage III CKD:  Solitary Kidney  -creatinine 1.1-1.2    Type II Diabetes:  -Hgb A1C 6.4               Interval History:     O2 at 2L   Ongoing cough, wheezing  No CP, palpitations                Medications:     Current Facility-Administered Medications   Medication Dose Route Frequency Provider Last Rate Last Admin    acetylcysteine (MUCOMYST) 10 % nebulizer solution 4 mL  4 mL Nebulization 4x Daily Nba Mello MD   4 mL at 08/14/24 0732    albuterol (PROVENTIL) neb solution 2.5 mg  2.5 mg Nebulization 4x Daily Nba Mello  "MD AMBROSE        aspirin EC tablet 81 mg  81 mg Oral Daily Nba Mello MD   81 mg at 08/14/24 0913    atorvastatin (LIPITOR) tablet 40 mg  40 mg Oral QPM Nba Mello MD        azithromycin (ZITHROMAX) tablet 250 mg  250 mg Oral Daily Edel Wong MD   250 mg at 08/14/24 0913    cefTRIAXone (ROCEPHIN) 2 g vial to attach to  ml bag for ADULTS or NS 50 ml bag for PEDS  2 g Intravenous Q24H Edel Wong  mL/hr at 08/12/24 2000 2 g at 08/13/24 2014    enoxaparin ANTICOAGULANT (LOVENOX) injection 40 mg  40 mg Subcutaneous Q24H Edel Wong MD   40 mg at 08/14/24 0912    insulin aspart (NovoLOG) injection (RAPID ACTING)  1-7 Units Subcutaneous TID AC Mihir Kay MD        insulin aspart (NovoLOG) injection (RAPID ACTING)  1-5 Units Subcutaneous At Bedtime Mihir Kay MD        lisinopril (ZESTRIL) tablet 10 mg  10 mg Oral Daily Nba Mello MD   10 mg at 08/14/24 0913    metoprolol succinate ER (TOPROL XL) 24 hr tablet 50 mg  50 mg Oral Daily Nba Mello MD   50 mg at 08/14/24 0913    predniSONE (DELTASONE) tablet 60 mg  60 mg Oral Daily Nba Mello MD   60 mg at 08/14/24 0913    sodium chloride (PF) 0.9% PF flush 3 mL  3 mL Intracatheter Q8H Placido Forbes MD   3 mL at 08/13/24 1718    sodium chloride (PF) 0.9% PF flush 3 mL  3 mL Intracatheter Q8H Edel Wong MD   3 mL at 08/14/24 0914    spironolactone (ALDACTONE) tablet 25 mg  25 mg Oral Daily Nba Mello MD   25 mg at 08/14/24 0912            Physical Exam:   Blood pressure 135/61, pulse 72, temperature 98.7  F (37.1  C), temperature source Oral, resp. rate 20, height 1.575 m (5' 2\"), weight 91 kg (200 lb 9.6 oz), SpO2 (!) 85%, not currently breastfeeding.  Wt Readings from Last 3 Encounters:   08/14/24 91 kg (200 lb 9.6 oz)   04/27/21 83.5 kg (184 lb)   09/03/19 81.2 kg (179 lb)     I/O last 3 completed shifts:  In: 180 [P.O.:180]  Out: -     CONST:  alert and oriented  LUNGS:  scattered " insp and exp wheezing throughout lung fields  CARDIO:  RRR  distant heart tones  ABD:  obese  EXT:  no edema           Data:   TELE:  SR with BBB, brief PSVT, PACs    CBC  Recent Labs   Lab 08/13/24  0721 08/12/24  0856   WBC 13.2* 12.7*   HGB 13.6 14.7    149*       BMP  Recent Labs   Lab 08/14/24  0638 08/14/24  0212 08/13/24  2153 08/13/24  0739 08/13/24  0721 08/12/24  2234 08/12/24  0856 08/11/24 2003     --   --   --  132*  --  134* 132*   POTASSIUM 4.7  --   --   --  4.1  --  4.3 4.3   CHLORIDE 103  --   --   --  100  --  103 97*   KIRSTIE 9.4  --   --   --  8.9  --  8.6* 9.2   CO2 22  --   --   --  20*  --  16* 17*   BUN 30.9*  --   --   --  23.6*  --  16.0 12.5   CR 1.21*  --   --   --  1.20*  --  1.10* 1.16*   *  111* 141* 179*   < > 189*   < > 160* 241*    < > = values in this interval not displayed.     Recent Labs   Lab Test 11/16/18  1245   CHOL 253*   HDL 67   *   TRIG 174*       TROP  Lab Results   Component Value Date    TROPI 37.900 (HH) 07/01/2014    TROPI (HH) 06/30/2014     >80.000  Critical Value called to and read back by  CARLO LONG RN ON 4E AT 1211 6/30/14, [place]  Test Added      TROPI 0.046 (H) 06/29/2014    TROPI <0.012 08/14/2013    TROPONIN 0.02 06/29/2014       BNP  Recent Labs   Lab 08/11/24 2003   NTBNPI 3,103*     Cardiology staff attestation note  I have personally examined the patient, taken her interim history and reviewed medications/labs and have spoken with . She feels better though still has productive cough. She does NOT want further cardiac testing, medical therapy alone. Her exam shows occasional rhonchi with no rales on exam. Distant regular S1 and S2    Decisions made by me  1) continue present GDMT for HFREF and CAD  2) I have counseled her to stop smoking  3) She does not want further cardiac testing and it appears at this time she does not want cardiology follow up. If she changes her mind we can see again   4) I would discharge her  on prn NTG in case she notes recurrent angina.     We will sign off case  [place]

## 2024-08-14 NOTE — PROGRESS NOTES
"[facility]    Medicine Progress Note - Hospitalist Service    Date of Admission:  8/11/2024    Assessment & Plan   Allyson Samuel is a 79 year old female who came to attention on  on 8/11/2024 due to \"feeling sick\" and SOB for several days PTA.  She was found to be in acute hypoxic resp failure and was admitted with severe sepsis and acute hypoxic and hypercapnic resp failure due to pneumonia.    Significant PMH includes tobacco abuse without a formal dx of COPD, CAD (hx MI and stenting in 2003), HTN, Macular degeneration.  She underwent nephrectomy in 2015 for urothelial cancer. The pt apparently does not follow regularly with medical care since about 2019.    Imaging: left lower lobe infiltrate with air bronchograms consistent with pneumonia. Otherwise, pt with evident emphysematous changes throughout both lungs.      DX:  Acute hypoxic and hypercapnic resp failure in the setting of evident emphysematous changes in both lungs and evident left lower lobe infiltrate.  Initialy VBG showed pH 7.11 with pCO2 62.    Severe sepsis with lactic acidosis, leukocytosis, mild CYNTHIA.   HFrEF.  Elevated BNP, EF measured at 48%.  Noted to have a run of Ventricular Ectopy overnight.   Ventricular ectopy v aberrantly conducted a fib. Documented at 0141 on telemetry.   Elevated troponin thought to be due to strain in the setting of acute infection.   Mild CYNTHIA with electrolyte disturbances. Hyponatremia, low bicarbonate.   HTN.  Making HF more difficult to manage.   DM II. Hyperglycemia exacerbated by steroid exposure.       PLAN:  1.  Cont with empiric Ceftriaxone and Azithromycin. Empirically start prednisone 40 g qd.   2.  Pulmonary toilet. Start mucomyst nebs.   3.  Wean as able.  4.  Gloria Long) saw pt on 8/13 and recommended medication regimen for Systolic HF in the setting of CAD.  Stop amlodipine, advance dose of metoprolol, resume lisinopril and start spironolactone along with empagliflozin. Also " "recommended atorvastatin. Will trial diuretics tomorrow if creat is stable.   5.  Given pt's report of SANCHEZ/chest discomfort, will consider stress testing. She would likely not be a good candidate for anything beyond medical therapy, but it would be worthwhile to know if she has symptomatic CAD.   6.  Continues Prednisone 60 mg daily.   7.  Off BiPAP now x 36 hours.           Diet: Regular Diet Adult    DVT Prophylaxis: Enoxaparin (Lovenox) SQ  Prado Catheter: Not present  Lines: None     Cardiac Monitoring: ACTIVE order. Indication: imc.  bipap respiratory failure  Code Status: No CPR- Do NOT Intubate      Clinically Significant Risk Factors          # Hypocalcemia: Lowest Ca = 8.6 mg/dL in last 2 days, will monitor and replace as appropriate         # Hypertension: Noted on problem list           # Obesity: Estimated body mass index is 36.69 kg/m  as calculated from the following:    Height as of this encounter: 1.575 m (5' 2\").    Weight as of this encounter: 91 kg (200 lb 9.6 oz)., PRESENT ON ADMISSION                  Disposition Plan     Medically Ready for Discharge: Anticipated in 2-4 Days      Nba Mello MD  Hospitalist Service  [facility]  Securely message with [email or web address] (more info)  Text page via [email or web address] Paging/Directory   ______________________________________________________________________    Interval History   Pt able to go all night without BiPAP support.     Feeling improved. Needs PT/OT evals.     Physical Exam   Vital Signs: Temp: 97.5  F (36.4  C) Temp src: Oral BP: (!) 146/76 Pulse: 72 (Tele)   Resp: 22 SpO2: 97 % O2 Device: Oxymask Oxygen Delivery: 3 LPM  Weight: 200 lbs 9.6 oz    General Appearance: Alert and comfortable breathing 5 LPM by NC.  Respiratory: Breath sounds are coarse.  Dense wheeze throughout.   Cardiovascular: RRR without murmur  GI: soft, obese  Skin: no lesions. Trace edema.  Other:      Medical Decision Making       40 MINUTES SPENT BY ME on the date of " service doing chart review, history, exam, documentation & further activities per the note.      Data   Results for orders placed or performed during the hospital encounter of 08/11/24 (from the past 24 hour(s))   Glucose by meter   Result Value Ref Range    GLUCOSE BY METER POCT 179 (H) 70 - 99 mg/dL   Glucose by meter   Result Value Ref Range    GLUCOSE BY METER POCT 141 (H) 70 - 99 mg/dL   Magnesium   Result Value Ref Range    Magnesium 2.2 1.7 - 2.3 mg/dL   Phosphorus   Result Value Ref Range    Phosphorus 3.3 2.5 - 4.5 mg/dL   Basic metabolic panel   Result Value Ref Range    Sodium 137 135 - 145 mmol/L    Potassium 4.7 3.4 - 5.3 mmol/L    Chloride 103 98 - 107 mmol/L    Carbon Dioxide (CO2) 22 22 - 29 mmol/L    Anion Gap 12 7 - 15 mmol/L    Urea Nitrogen 30.9 (H) 8.0 - 23.0 mg/dL    Creatinine 1.21 (H) 0.51 - 0.95 mg/dL    GFR Estimate 45 (L) >60 mL/min/1.73m2    Calcium 9.4 8.8 - 10.4 mg/dL    Glucose 111 (H) 70 - 99 mg/dL   Glucose by meter   Result Value Ref Range    GLUCOSE BY METER POCT 116 (H) 70 - 99 mg/dL   Hepatic panel   Result Value Ref Range    Protein Total 6.8 6.4 - 8.3 g/dL    Albumin 3.8 3.5 - 5.2 g/dL    Bilirubin Total 0.2 <=1.2 mg/dL    Alkaline Phosphatase 78 40 - 150 U/L    AST 24 0 - 45 U/L    ALT 20 0 - 50 U/L    Bilirubin Direct <0.20 0.00 - 0.30 mg/dL   Glucose by meter   Result Value Ref Range    GLUCOSE BY METER POCT 126 (H) 70 - 99 mg/dL   Glucose by meter   Result Value Ref Range    GLUCOSE BY METER POCT 191 (H) 70 - 99 mg/dL

## 2024-08-14 NOTE — PLAN OF CARE
"Goal Outcome Evaluation:      Plan of Care Reviewed With: patient    Overall Patient Progress: improvingOverall Patient Progress: improving    Outcome Evaluation: denies pain, A&Ox4, LS coarse and wheezing, on 2l NC - unable to wean off yet due to desaturation    Shift : 0700 - 1900    Vitals: Temp: 97.6  F (36.4  C) Temp src: Oral BP: 121/60 Pulse: 76   Resp: 20 SpO2: 93 % O2 Device: Nasal cannula Oxygen Delivery: 2 LPM     Orientation: x4, very pleasant   Neuro: intact   Pain: denies pain   Tele: SR, - discontinued   Activity: ambulated in room with standby assist and sometimes the walker   Resp: LS coarse and wheezy, stable on 2l NC, desaturation on room air to mid 80's.   Diet: ate her meals   How to take meds: whole with fluids   GI: loose stools. Small amount   : incontinent   Protocol: K, mag and phos in range, recheck tomorrow   B, 126, 191, insulin pen unavailable until  - so insulin not given at this time   Skin: some scattered bruising  Lines: PIV SL and patent   Other: no dentures,   Plan: cont with plan of care        Problem: Adult Inpatient Plan of Care  Goal: Plan of Care Review  Description: The Plan of Care Review/Shift note should be completed every shift.  The Outcome Evaluation is a brief statement about your assessment that the patient is improving, declining, or no change.  This information will be displayed automatically on your shift  note.  Outcome: Progressing  Flowsheets (Taken 2024 1540)  Outcome Evaluation: denies pain, A&Ox4, LS coarse and wheezing, on 2l NC - unable to wean off yet due to desaturation  Plan of Care Reviewed With: patient  Overall Patient Progress: improving  Goal: Patient-Specific Goal (Individualized)  Description: You can add care plan individualizations to a care plan. Examples of Individualization might be:  \"Parent requests to be called daily at 9am for status\", \"I have a hard time hearing out of my right ear\", or \"Do not touch me to wake me up " "as it startles  me\".  Outcome: Progressing  Goal: Absence of Hospital-Acquired Illness or Injury  Outcome: Progressing  Intervention: Identify and Manage Fall Risk  Recent Flowsheet Documentation  Taken 8/14/2024 1419 by Lillie De Los Santos RN  Safety Promotion/Fall Prevention:   safety round/check completed   nonskid shoes/slippers when out of bed  Taken 8/14/2024 1250 by Lillie De Los Santos RN  Safety Promotion/Fall Prevention:   safety round/check completed   clutter free environment maintained  Taken 8/14/2024 1052 by Lillie De Los Santos RN  Safety Promotion/Fall Prevention: safety round/check completed  Taken 8/14/2024 0920 by Lillie De Los Santos RN  Safety Promotion/Fall Prevention:   safety round/check completed   nonskid shoes/slippers when out of bed   clutter free environment maintained  Intervention: Prevent Skin Injury  Recent Flowsheet Documentation  Taken 8/14/2024 1419 by Lillie De Los Santos RN  Body Position: position changed independently  Taken 8/14/2024 1250 by Lillie De Los Santos RN  Body Position: position changed independently  Taken 8/14/2024 1115 by Lillie De Los Santos RN  Body Position: position changed independently  Taken 8/14/2024 1052 by Lillie De Los Santos RN  Body Position: position changed independently  Taken 8/14/2024 0920 by Lillie De Los Santos RN  Body Position: position changed independently  Device Skin Pressure Protection: absorbent pad utilized/changed  Intervention: Prevent and Manage VTE (Venous Thromboembolism) Risk  Recent Flowsheet Documentation  Taken 8/14/2024 0920 by Lillie De Los Santos RN  VTE Prevention/Management: SCDs off (sequential compression devices)  Intervention: Prevent Infection  Recent Flowsheet Documentation  Taken 8/14/2024 0920 by Lillie De Los Santos RN  Infection Prevention: rest/sleep promoted  Goal: Optimal Comfort and Wellbeing  Outcome: Progressing  Intervention: Provide Person-Centered Care  Recent Flowsheet Documentation  Taken 8/14/2024 1052 by Lillie De Los Santos RN  Trust " Relationship/Rapport: care explained  Taken 8/14/2024 0920 by Lillie De Los Santos RN  Trust Relationship/Rapport: care explained  Goal: Readiness for Transition of Care  Outcome: Progressing     Problem: Skin Injury Risk Increased  Goal: Skin Health and Integrity  Outcome: Progressing  Intervention: Plan: Nurse Driven Intervention: Moisture Management  Recent Flowsheet Documentation  Taken 8/14/2024 1419 by Lillie De Los Santos RN  Bathing/Skin Care: incontinence care  Taken 8/14/2024 0920 by Lillie De Los Santos RN  Moisture Interventions:   Encourage regular toileting   Incontinence pad  Plan: Moisture Management:   encourage regular toileting   no brief in bed   incontinence pad  Intervention: Optimize Skin Protection  Recent Flowsheet Documentation  Taken 8/14/2024 1419 by Lillie De Los Santos RN  Activity Management:   ambulated to bathroom   back to bed  Head of Bed (HOB) Positioning: HOB at 30-45 degrees  Taken 8/14/2024 1250 by Lillie De Los Santos RN  Activity Management: ambulated in room  Taken 8/14/2024 1115 by Lillie De Los Santos RN  Activity Management: back to bed  Head of Bed (HOB) Positioning: HOB at 20-30 degrees  Taken 8/14/2024 1052 by Lillie De Los Santos RN  Activity Management: activity adjusted per tolerance  Taken 8/14/2024 0920 by Lillie De Los Santos RN  Activity Management:   activity adjusted per tolerance   back to bed  Head of Bed (HOB) Positioning: HOB at 30-45 degrees  Intervention: Promote and Optimize Oral Intake  Recent Flowsheet Documentation  Taken 8/14/2024 0920 by Lillie De Los Santos RN  Oral Nutrition Promotion: rest periods promoted     Problem: Gas Exchange Impaired  Goal: Optimal Gas Exchange  Outcome: Progressing  Intervention: Optimize Oxygenation and Ventilation  Recent Flowsheet Documentation  Taken 8/14/2024 1419 by Lillie De Los Santos RN  Head of Bed (HOB) Positioning: HOB at 30-45 degrees  Taken 8/14/2024 1115 by Lillie De Los Santos RN  Head of Bed (HOB) Positioning: HOB at 20-30 degrees  Taken 8/14/2024  0920 by Lillie De Los Santos RN  Head of Bed (HOB) Positioning: HOB at 30-45 degrees     Problem: Fall Injury Risk  Goal: Absence of Fall and Fall-Related Injury  Outcome: Progressing  Intervention: Identify and Manage Contributors  Recent Flowsheet Documentation  Taken 8/14/2024 1250 by Lillie De Los Santos RN  Medication Review/Management: medications reviewed  Taken 8/14/2024 0920 by Lillie De Los Santos RN  Medication Review/Management: medications reviewed  Intervention: Promote Injury-Free Environment  Recent Flowsheet Documentation  Taken 8/14/2024 1419 by Lillie De Los Santos RN  Safety Promotion/Fall Prevention:   safety round/check completed   nonskid shoes/slippers when out of bed  Taken 8/14/2024 1250 by Lillie De Los Santos RN  Safety Promotion/Fall Prevention:   safety round/check completed   clutter free environment maintained  Taken 8/14/2024 1052 by Lillie De Los Santos RN  Safety Promotion/Fall Prevention: safety round/check completed  Taken 8/14/2024 0920 by Lillie De Los Santos RN  Safety Promotion/Fall Prevention:   safety round/check completed   nonskid shoes/slippers when out of bed   clutter free environment maintained     Problem: COPD (Chronic Obstructive Pulmonary Disease)  Goal: Optimal Chronic Illness Coping  Outcome: Progressing  Intervention: Support and Optimize Psychosocial Response  Recent Flowsheet Documentation  Taken 8/14/2024 0920 by Lillie De Los Santos RN  Supportive Measures: active listening utilized  Goal: Optimal Level of Functional [place]  Outcome: Progressing  Intervention: Optimize Functional Ability  Recent Flowsheet Documentation  Taken 8/14/2024 1419 by Lillie De Los Santos RN  Activity Management:   ambulated to bathroom   back to bed  Taken 8/14/2024 1250 by Lillie De Los Santos RN  Activity Management: ambulated in room  Taken 8/14/2024 1115 by Lillie De Los Santos, RN  Activity Management: back to bed  Taken 8/14/2024 1052 by Lillie De Los Santos, RN  Activity Management: activity adjusted per  tolerance  Taken 8/14/2024 0920 by Lillie De Los Santos RN  Activity Management:   activity adjusted per tolerance   back to bed  Environmental Support: calm environment promoted  Goal: Absence of Infection Signs and Symptoms  Outcome: Progressing  Goal: Improved Oral Intake  Outcome: Progressing  Intervention: Promote and Optimize Oral Intake  Recent Flowsheet Documentation  Taken 8/14/2024 0920 by Lillie De Los Santos RN  Oral Nutrition Promotion: rest periods promoted  Goal: Effective Oxygenation and Ventilation  Outcome: Progressing  Intervention: Promote Airway Secretion Clearance  Recent Flowsheet Documentation  Taken 8/14/2024 1419 by Lillie De Los Santos RN  Activity Management:   ambulated to bathroom   back to bed  Taken 8/14/2024 1250 by Lillie De Los Santos RN  Activity Management: ambulated in room  Taken 8/14/2024 1115 by Lillie De Los Santos RN  Activity Management: back to bed  Taken 8/14/2024 1052 by Lillie De Los Santos RN  Activity Management: activity adjusted per tolerance  Taken 8/14/2024 0920 by Lillie De Los Santos RN  Cough And Deep Breathing: done independently per patient  Activity Management:   activity adjusted per tolerance   back to bed  Intervention: Optimize Oxygenation and Ventilation  Recent Flowsheet Documentation  Taken 8/14/2024 1419 by Lillie De Los Santos RN  Head of Bed (HOB) Positioning: HOB at 30-45 degrees  Taken 8/14/2024 1115 by Lillie De Los Santos RN  Head of Bed (HOB) Positioning: HOB at 20-30 degrees  Taken 8/14/2024 0920 by Lillie De Los Santos RN  Fluid/Electrolyte Management: fluids provided  Head of Bed (HOB) Positioning: HOB at 30-45 degrees      Calm

## 2024-08-15 ENCOUNTER — APPOINTMENT (OUTPATIENT)
Dept: OCCUPATIONAL THERAPY | Facility: CLINIC | Age: 79
DRG: 871 | End: 2024-08-15
Attending: INTERNAL MEDICINE
Payer: MEDICARE

## 2024-08-15 LAB
ANION GAP SERPL CALCULATED.3IONS-SCNC: 11 MMOL/L (ref 7–15)
BUN SERPL-MCNC: 28.5 MG/DL (ref 8–23)
CALCIUM SERPL-MCNC: 9.3 MG/DL (ref 8.8–10.4)
CHLORIDE SERPL-SCNC: 103 MMOL/L (ref 98–107)
CREAT SERPL-MCNC: 1.11 MG/DL (ref 0.51–0.95)
EGFRCR SERPLBLD CKD-EPI 2021: 50 ML/MIN/1.73M2
GLUCOSE BLDC GLUCOMTR-MCNC: 145 MG/DL (ref 70–99)
GLUCOSE BLDC GLUCOMTR-MCNC: 160 MG/DL (ref 70–99)
GLUCOSE BLDC GLUCOMTR-MCNC: 180 MG/DL (ref 70–99)
GLUCOSE BLDC GLUCOMTR-MCNC: 196 MG/DL (ref 70–99)
GLUCOSE BLDC GLUCOMTR-MCNC: 98 MG/DL (ref 70–99)
GLUCOSE SERPL-MCNC: 110 MG/DL (ref 70–99)
HCO3 SERPL-SCNC: 22 MMOL/L (ref 22–29)
MAGNESIUM SERPL-MCNC: 2.2 MG/DL (ref 1.7–2.3)
PHOSPHATE SERPL-MCNC: 3 MG/DL (ref 2.5–4.5)
POTASSIUM SERPL-SCNC: 4.4 MMOL/L (ref 3.4–5.3)
SODIUM SERPL-SCNC: 136 MMOL/L (ref 135–145)

## 2024-08-15 PROCEDURE — 97165 OT EVAL LOW COMPLEX 30 MIN: CPT | Mod: GO

## 2024-08-15 PROCEDURE — 94640 AIRWAY INHALATION TREATMENT: CPT

## 2024-08-15 PROCEDURE — 99233 SBSQ HOSP IP/OBS HIGH 50: CPT | Performed by: INTERNAL MEDICINE

## 2024-08-15 PROCEDURE — 83735 ASSAY OF MAGNESIUM: CPT | Performed by: INTERNAL MEDICINE

## 2024-08-15 PROCEDURE — 97530 THERAPEUTIC ACTIVITIES: CPT | Mod: GO

## 2024-08-15 PROCEDURE — 250N000009 HC RX 250: Performed by: INTERNAL MEDICINE

## 2024-08-15 PROCEDURE — 250N000012 HC RX MED GY IP 250 OP 636 PS 637: Performed by: INTERNAL MEDICINE

## 2024-08-15 PROCEDURE — 97535 SELF CARE MNGMENT TRAINING: CPT | Mod: GO

## 2024-08-15 PROCEDURE — 999N000157 HC STATISTIC RCP TIME EA 10 MIN

## 2024-08-15 PROCEDURE — 84100 ASSAY OF PHOSPHORUS: CPT | Performed by: INTERNAL MEDICINE

## 2024-08-15 PROCEDURE — 250N000011 HC RX IP 250 OP 636: Performed by: INTERNAL MEDICINE

## 2024-08-15 PROCEDURE — 94640 AIRWAY INHALATION TREATMENT: CPT | Mod: 76

## 2024-08-15 PROCEDURE — 999N000147 HC STATISTIC PT IP EVAL DEFER

## 2024-08-15 PROCEDURE — 36415 COLL VENOUS BLD VENIPUNCTURE: CPT | Performed by: INTERNAL MEDICINE

## 2024-08-15 PROCEDURE — 250N000013 HC RX MED GY IP 250 OP 250 PS 637: Performed by: INTERNAL MEDICINE

## 2024-08-15 PROCEDURE — 80048 BASIC METABOLIC PNL TOTAL CA: CPT | Performed by: INTERNAL MEDICINE

## 2024-08-15 PROCEDURE — 120N000001 HC R&B MED SURG/OB

## 2024-08-15 RX ORDER — TORSEMIDE 10 MG/1
10 TABLET ORAL DAILY
Status: DISCONTINUED | OUTPATIENT
Start: 2024-08-15 | End: 2024-08-15

## 2024-08-15 RX ORDER — TORSEMIDE 5 MG/1
5 TABLET ORAL DAILY
Status: DISCONTINUED | OUTPATIENT
Start: 2024-08-15 | End: 2024-08-17 | Stop reason: HOSPADM

## 2024-08-15 RX ADMIN — ASPIRIN 81 MG: 81 TABLET, COATED ORAL at 08:37

## 2024-08-15 RX ADMIN — ACETYLCYSTEINE 4 ML: 100 SOLUTION ORAL; RESPIRATORY (INHALATION) at 16:12

## 2024-08-15 RX ADMIN — ALBUTEROL SULFATE 2.5 MG: 2.5 SOLUTION RESPIRATORY (INHALATION) at 20:02

## 2024-08-15 RX ADMIN — CEFTRIAXONE 2 G: 2 INJECTION, POWDER, FOR SOLUTION INTRAMUSCULAR; INTRAVENOUS at 20:53

## 2024-08-15 RX ADMIN — SPIRONOLACTONE 25 MG: 25 TABLET, FILM COATED ORAL at 08:37

## 2024-08-15 RX ADMIN — ENOXAPARIN SODIUM 40 MG: 40 INJECTION SUBCUTANEOUS at 08:37

## 2024-08-15 RX ADMIN — TORSEMIDE 5 MG: 5 TABLET ORAL at 13:41

## 2024-08-15 RX ADMIN — HYDRALAZINE HYDROCHLORIDE 10 MG: 20 INJECTION INTRAMUSCULAR; INTRAVENOUS at 23:37

## 2024-08-15 RX ADMIN — ACETYLCYSTEINE 4 ML: 100 SOLUTION ORAL; RESPIRATORY (INHALATION) at 07:47

## 2024-08-15 RX ADMIN — METOPROLOL SUCCINATE 50 MG: 50 TABLET, EXTENDED RELEASE ORAL at 08:37

## 2024-08-15 RX ADMIN — ALBUTEROL SULFATE 2.5 MG: 2.5 SOLUTION RESPIRATORY (INHALATION) at 16:12

## 2024-08-15 RX ADMIN — ACETYLCYSTEINE 4 ML: 100 SOLUTION ORAL; RESPIRATORY (INHALATION) at 12:10

## 2024-08-15 RX ADMIN — ALBUTEROL SULFATE 2.5 MG: 2.5 SOLUTION RESPIRATORY (INHALATION) at 12:10

## 2024-08-15 RX ADMIN — ALBUTEROL SULFATE 2.5 MG: 2.5 SOLUTION RESPIRATORY (INHALATION) at 07:47

## 2024-08-15 RX ADMIN — ACETYLCYSTEINE 4 ML: 100 SOLUTION ORAL; RESPIRATORY (INHALATION) at 20:02

## 2024-08-15 RX ADMIN — AZITHROMYCIN DIHYDRATE 250 MG: 250 TABLET ORAL at 08:37

## 2024-08-15 RX ADMIN — LISINOPRIL 10 MG: 10 TABLET ORAL at 08:37

## 2024-08-15 RX ADMIN — PREDNISONE 60 MG: 20 TABLET ORAL at 08:37

## 2024-08-15 ASSESSMENT — ACTIVITIES OF DAILY LIVING (ADL)
ADLS_ACUITY_SCORE: 34
ADLS_ACUITY_SCORE: 37
ADLS_ACUITY_SCORE: 34
ADLS_ACUITY_SCORE: 31
ADLS_ACUITY_SCORE: 34
ADLS_ACUITY_SCORE: 37
ADLS_ACUITY_SCORE: 34
ADLS_ACUITY_SCORE: 34
ADLS_ACUITY_SCORE: 37
ADLS_ACUITY_SCORE: 34
ADLS_ACUITY_SCORE: 37
ADLS_ACUITY_SCORE: 34
ADLS_ACUITY_SCORE: 37
ADLS_ACUITY_SCORE: 34

## 2024-08-15 NOTE — PLAN OF CARE
"Temp: 98.7  F (37.1  C) Temp src: Oral BP: (!) 168/80 Pulse: 75   Resp: 20 SpO2: 95 % O2 Device: Nasal cannula Oxygen Delivery: 2 LPM     Orientation:  A&O x4  VS: VSS  Pain:  Denies Pain  Activity:  A1 SBA  Resp:  2L NC  GI:  Denies nausea and vomiting  : Voiding into purwick  Skin:  WDL  Lines: Piv SL  Diet: Regular  Labs:  Potassium, Mag, Phosphorus protocol   Plan: Home  Discharge:  Pending    Problem: Adult Inpatient Plan of Care  Goal: Plan of Care Review  Description: The Plan of Care Review/Shift note should be completed every shift.  The Outcome Evaluation is a brief statement about your assessment that the patient is improving, declining, or no change.  This information will be displayed automatically on your shift  note.  Outcome: Progressing  Flowsheets (Taken 8/15/2024 1244)  Outcome Evaluation: States feeling better  Plan of Care Reviewed With: patient  Overall Patient Progress: improving  Goal: Patient-Specific Goal (Individualized)  Description: You can add care plan individualizations to a care plan. Examples of Individualization might be:  \"Parent requests to be called daily at 9am for status\", \"I have a hard time hearing out of my right ear\", or \"Do not touch me to wake me up as it startles  me\".  Outcome: Progressing  Goal: Absence of Hospital-Acquired Illness or Injury  Outcome: Progressing  Intervention: Identify and Manage Fall Risk  Recent Flowsheet Documentation  Taken 8/15/2024 0958 by Quynh Alvarado RN  Safety Promotion/Fall Prevention: safety round/check completed  Intervention: Prevent Infection  Recent Flowsheet Documentation  Taken 8/15/2024 0958 by Quynh Alvarado RN  Infection Prevention:   personal protective equipment utilized   hand hygiene promoted  Goal: Optimal Comfort and Wellbeing  Outcome: Progressing  Goal: Readiness for Transition of Care  Outcome: Progressing       Goal Outcome Evaluation:      Plan of Care Reviewed With: patient    Overall Patient " Progress: improvingOverall Patient Progress: improving    Outcome Evaluation: States feeling better

## 2024-08-15 NOTE — PROGRESS NOTES
"   08/15/24 0900   Appointment Info   Signing Clinician's Name / Credentials (OT) Elana Rivas, OTD, OTR/L   Living Environment   People in Home child(vinay), adult;other (see comments)  (Pts daughter, daughters )   Current Living Arrangements house  (rambler; pt lives in basement of rambler)   Home Accessibility stairs to enter home;stairs within home   Number of Stairs, Main Entrance 10   Stair Railings, Main Entrance none   Number of Stairs, Within Home, Primary greater than 10 stairs   Stair Railings, Within Home, Primary railing on right side (ascending)   Transportation Anticipated family or friend will provide   [facility] walk in shower with shower chair. SHT.   Self-Care   Usual Activity Tolerance good   Current Activity Tolerance moderate   Regular Exercise No   Equipment Currently Used at Home shower chair   Fall history within last six months no   Activity/Exercise/Self-Care Comment Pt ind at baseline in all ADLs.   Instrumental Activities of Daily Living (IADL)   IADL Comments Pt ind at baseline in most IADLs, does have support and assist for driving, grocery shopping, errands, and meals from family.   General Information   Onset of Illness/Injury or Date of Surgery 08/11/24   Referring Physician Nba Mello MD   Patient/Family Therapy Goal Statement (OT) Return home and be ind.   Additional Occupational Profile Info/Pertinent History of Current Problem Per chart: \"Allyson Samuel is a 79 year old female who came to attention on  on 8/11/2024 due to \"feeling sick\" and SOB for several days PTA.  She was found to be in acute hypoxic resp failure and was admitted with severe sepsis and acute hypoxic and hypercapnic resp failure due to pneumonia.\"   Existing Precautions/Restrictions fall;oxygen therapy device and L/min   Cognitive Status Examination   Orientation Status orientation to person, place and time   Visual Perception   Visual Impairment/Limitations other " (see comments)  (macular degeneration bilateral eyes)   Sensory   Sensory Quick Adds sensation intact   Pain Assessment   Patient Currently in Pain No   Posture   Posture not impaired   Range of Motion Comprehensive   Comment, General Range of Motion BUE WFL   Strength Comprehensive (MMT)   Comment, General Manual Muscle Testing (MMT) Assessment BUE WFL, general weakness and decreased activity tolerance   Bed Mobility   Comment (Bed Mobility) SBA   Transfers   Transfer Comments SBA   Balance   Balance Comments Overall steady, mild unsteadiness during ambulation-not overt   Activities of Daily Living   BADL Assessment/Intervention bathing;toileting   Bathing Assessment/Intervention   Comment, (Bathing) CGA   Toileting   Comment, (Toileting) SBA   Clinical Impression   Criteria for Skilled Therapeutic Interventions Met (OT) Yes, treatment indicated   OT Diagnosis Impaired ALDs   Influenced by the following impairments Acute respiratory failure   OT Problem List-Impairments impacting ADL problems related to;activity tolerance impaired;cognition;mobility;strength   Assessment of Occupational Performance 1-3 Performance Deficits   Identified Performance Deficits bathing, activity tolerance, home mgmt   Planned Therapy Interventions (OT) ADL retraining;cognition;strengthening;progressive activity/exercise;risk factor education;transfer training   Clinical Decision Making Complexity (OT) problem focused assessment/low complexity   Risk & Benefits of therapy have been explained evaluation/treatment results reviewed;care plan/treatment goals reviewed;risks/benefits reviewed;current/potential barriers reviewed;participants voiced agreement with care plan;participants included;patient;daughter   OT Total Evaluation Time   OT Eval, Low Complexity Minutes (94051) 6   OT Goals   Therapy Frequency (OT) Daily   OT Predicted Duration/Target Date for Goal Attainment 08/19/24   OT Goals Cognition;Cardiac Phase 1   OT: Cognitive  Patient/caregiver will verbalize understanding of cognitive assessment results/recommendations as needed for safe discharge planning   OT: Understanding of cardiac education to maximize quality of life, condition management, and health outcomes Patient   OT: Perform aerobic activity with stable cardiovascular response 5 minutes;continuous;ambulation   OT: Functional/aerobic ambulation tolerance with stable cardiovascular response in order to return to home and community environment Independent;200 feet   OT: Navigation of stairs simulating home set up with stable cardiovascular response in order to return to home and community environment Independent;Greater than 10 stairs;Rail on right   Interventions   Interventions Quick Adds Self-Care/Home Management;Therapeutic Activity   Self-Care/Home Management   Self-Care/Home Mgmt/ADL, Compensatory, Meal Prep Minutes (46336) 25   Symptoms Noted During/After Treatment (Meal Preparation/Planning Training) fatigue   Treatment Detail/Skilled Intervention Pt greeted seated in chair upon arrival and agreeable to therapy session. Pt mostly limted by SOB and general decreased activity tolerance this session. Able to complete LB dressing with SBA to doff/don socks on BLEs using figure 4 technique. Then completed STS and ambulated within room and to/from bathroom with mostly SBA/CGA and no AD. Able to transfer to toilet with SBA and complete doff/donning of brief with set up assist, before particiapting in standing g/h cares with SBA. Educated pt on ECWS strategies for ADLs at home to increase safety. Currently on 2L O2 NC with SpO2 >91%.   Therapeutic Activities   Therapeutic Activity Minutes (31542) 15   Symptoms noted during/after treatment fatigue;shortness of breath   Treatment Detail/Skilled Intervention Pt participated in further functional ambulation in [facility] to [facility] actiivty tolerance and endurance for future ADLs. Pt ambulated ~100 ft + 75 ft with CGA/SBA, overall  steady with no AD but 1x mild LOB but pt able to self correct. Required 1x seated rest break between bouts of ambulation. Pt endorsed mild SOB, enoucraged PLB technique on 2L O2 during ambulation. ambualted and returend to room and O2 checked desatting to 88%, after brief seated rest break in chair pt returned to 96% on 2L. Pt left in chair at end of session with all immediate needs met and call light within reach.   OT Discharge Planning   OT Plan Stairs, act tolerance, endurance ADLs, cog assessment?   OT Discharge Recommendation (DC Rec) home with assist   OT Rationale for DC Rec Pt slightly below functional baseline in ADLs and mobility. Pt ind with ADLs and some IADLs at baseline, does live with daughter and family members who can assist with IADls and higher level cognitive tasks. Anticipate with an additional OT session pt able to return back home with assist and support.   OT Brief overview of current status SBA/CGA ambulation, toilet tx, dressing   Total Session Time   Timed Code Treatment Minutes 40   Total Session Time (sum of timed and untimed services) 46

## 2024-08-15 NOTE — PLAN OF CARE
Physical Therapy: Orders received. Chart reviewed and discussed with care team.? Physical Therapy not indicated due to pt mobilizing SBA, all IP therapy needs can be met with OT.? Defer discharge recommendations to Occupational Therapy and medical team.? Will complete orders.

## 2024-08-15 NOTE — PLAN OF CARE
"Pt is alert and oriented x4. Pt denies pain or discomfort. VSS. Pt is on 02 2L NC. Pt denies shortness of breath. Pt is on Azithromycin and Rocephin for pneumonia. Pt is assist of one in transfer and cares. Pt is on blood sugar checks. Pt no acute distress noted. Pt is sleeping in bed. Bed alarm in place and call light within reach.       Goal Outcome Evaluation:      Plan of Care Reviewed With: patient    Overall Patient Progress: improvingOverall Patient Progress: improving    Outcome Evaluation: pt is alert and oriented xe. pt is on 02 2L NC. pt is on antibiotics      Problem: Adult Inpatient Plan of Care  Goal: Plan of Care Review  Description: The Plan of Care Review/Shift note should be completed every shift.  The Outcome Evaluation is a brief statement about your assessment that the patient is improving, declining, or no change.  This information will be displayed automatically on your shift  note.  Outcome: Progressing  Flowsheets (Taken 8/14/2024 7709)  Outcome Evaluation: pt is alert and oriented xe. pt is on 02 2L NC. pt is on antibiotics  Plan of Care Reviewed With: patient  Overall Patient Progress: improving  Goal: Patient-Specific Goal (Individualized)  Description: You can add care plan individualizations to a care plan. Examples of Individualization might be:  \"Parent requests to be called daily at 9am for status\", \"I have a hard time hearing out of my right ear\", or \"Do not touch me to wake me up as it startles  me\".  Outcome: Progressing  Goal: Absence of Hospital-Acquired Illness or Injury  Outcome: Progressing  Intervention: Identify and Manage Fall Risk  Recent Flowsheet Documentation  Taken 8/14/2024 0498 by Bon Nguyễn RN  Safety Promotion/Fall Prevention:   assistive device/personal items within reach   activity supervised   clutter free environment maintained   increased rounding and observation   increase visualization of patient   nonskid shoes/slippers when out of bed   " patient and family education   safety round/check completed  Intervention: Prevent Skin Injury  Recent Flowsheet Documentation  Taken 8/14/2024 2345 by Bon Nguyễn RN  Body Position: position changed independently  Intervention: Prevent and Manage VTE (Venous Thromboembolism) Risk  Recent Flowsheet Documentation  Taken 8/14/2024 2345 by Bon Nguyễn RN  VTE Prevention/Management: SCDs off (sequential compression devices)  Intervention: Prevent Infection  Recent Flowsheet Documentation  Taken 8/14/2024 2345 by Bon Nguyễn RN  Infection Prevention:   hand hygiene promoted   rest/sleep promoted   single patient room provided  Goal: Optimal Comfort and Wellbeing  Outcome: Progressing  Intervention: Provide Person-Centered Care  Recent Flowsheet Documentation  Taken 8/14/2024 2345 by Bon Nguyễn RN  Trust Relationship/Rapport:   questions encouraged   reassurance provided   choices provided   care explained  Goal: Readiness for Transition of Care  Outcome: Progressing     Problem: Skin Injury Risk Increased  Goal: Skin Health and Integrity  Outcome: Progressing  Intervention: Plan: Nurse Driven Intervention: Moisture Management  Recent Flowsheet Documentation  Taken 8/14/2024 2345 by Bon Nguyễn RN  Moisture Interventions:   Encourage regular toileting   Incontinence pad  Intervention: Optimize Skin Protection  Recent Flowsheet Documentation  Taken 8/14/2024 2345 by Bon Nguyễn RN  Activity Management:   activity encouraged   activity adjusted per tolerance  Head of Bed (HOB) Positioning: HOB at 30 degrees     Problem: Gas Exchange Impaired  Goal: Optimal Gas Exchange  Outcome: Progressing  Intervention: Optimize Oxygenation and Ventilation  Recent Flowsheet Documentation  Taken 8/14/2024 2345 by Bon Nguyễn RN  Head of Bed (HOB) Positioning: HOB at 30 degrees     Problem: Fall Injury Risk  Goal: Absence of Fall and  Fall-Related Injury  Outcome: Progressing  Intervention: Identify and Manage Contributors  Recent Flowsheet Documentation  Taken 8/14/2024 2345 by Bon Nguyễn RN  Medication Review/Management: medications reviewed  Intervention: Promote Injury-Free Environment  Recent Flowsheet Documentation  Taken 8/14/2024 2345 by Bon Nguyễn RN  Safety Promotion/Fall Prevention:   assistive device/personal items within reach   activity supervised   clutter free environment maintained   increased rounding and observation   increase visualization of patient   nonskid shoes/slippers when out of bed   patient and family education   safety round/check completed     Problem: COPD (Chronic Obstructive Pulmonary Disease)  Goal: Optimal Chronic Illness Coping  Outcome: Progressing  Intervention: Support and Optimize Psychosocial Response  Recent Flowsheet Documentation  Taken 8/14/2024 2345 by Bon Nguyễn RN  Supportive Measures:   self-responsibility promoted   self-reflection promoted   self-care encouraged   active listening utilized  Family/Support System Care: self-care encouraged  Goal: Optimal Level of Functional [place]  Outcome: Progressing  Intervention: Optimize Functional Ability  Recent Flowsheet Documentation  Taken 8/14/2024 2345 by Bon Nguyễn RN  Activity Management:   activity encouraged   activity adjusted per tolerance  Environmental Support: calm environment promoted  Goal: Absence of Infection Signs and Symptoms  Outcome: Progressing  Goal: Improved Oral Intake  Outcome: Progressing  Goal: Effective Oxygenation and Ventilation  Outcome: Progressing  Intervention: Promote Airway Secretion Clearance  Recent Flowsheet Documentation  Taken 8/14/2024 2345 by Bon Nguyễn RN  Cough And Deep Breathing: done independently per patient  Activity Management:   activity encouraged   activity adjusted per tolerance  Intervention: Optimize Oxygenation and  Ventilation  Recent Flowsheet Documentation  Taken 8/14/2024 2345 by Bon Nguyễn RN  Head of Bed (HOB) Positioning: HOB at 30 degrees     Problem: Comorbidity Management  Goal: Maintenance of Asthma Control  Outcome: Progressing  Intervention: Maintain Asthma Symptom Control  Recent Flowsheet Documentation  Taken 8/14/2024 2345 by Bon Nguyễn RN  Medication Review/Management: medications reviewed  Goal: Maintenance of Behavioral Health Symptom Control  Outcome: Progressing  Intervention: Maintain Behavioral Health Symptom Control  Recent Flowsheet Documentation  Taken 8/14/2024 2345 by Bon Nguyễn RN  Medication Review/Management: medications reviewed  Goal: Maintenance of COPD Symptom Control  Outcome: Progressing  Intervention: Maintain COPD Symptom Control  Recent Flowsheet Documentation  Taken 8/14/2024 2345 by Bon Nguyễn RN  Supportive Measures:   self-responsibility promoted   self-reflection promoted   self-care encouraged   active listening utilized  Medication Review/Management: medications reviewed  Goal: Blood Glucose Levels Within Targeted Range  Outcome: Progressing  Intervention: Monitor and Manage Glycemia  Recent Flowsheet Documentation  Taken 8/14/2024 2345 by Bon Nguyễn RN  Medication Review/Management: medications reviewed  Goal: Maintenance of Heart Failure Symptom Control  Outcome: Progressing  Intervention: Maintain Heart Failure Management  Recent Flowsheet Documentation  Taken 8/14/2024 2345 by Bon Nguyễn RN  Medication Review/Management: medications reviewed  Goal: Blood Pressure in Desired Range  Outcome: Progressing  Intervention: Maintain Blood Pressure Management  Recent Flowsheet Documentation  Taken 8/14/2024 2345 by Bon Nguyễn RN  Medication Review/Management: medications reviewed  Goal: Maintenance of Osteoarthritis Symptom Control  Outcome: Progressing  Intervention: Maintain  Osteoarthritis Symptom Control  Recent Flowsheet Documentation  Taken 8/14/2024 2345 by Bon Nguyễn RN  Assistive Device Utilized:   gait belt   walker  Activity Management:   activity encouraged   activity adjusted per tolerance  Medication Review/Management: medications reviewed  Goal: Bariatric Home Regimen Maintained  Outcome: Progressing  Intervention: Maintain and Manage Postbariatric Surgery Care  Recent Flowsheet Documentation  Taken 8/14/2024 2345 by Bon Nguyễn RN  Medication Review/Management: medications reviewed  Goal: Maintenance of Seizure Control  Outcome: Progressing  Intervention: Maintain Seizure Symptom Control  Recent Flowsheet Documentation  Taken 8/14/2024 2345 by Bon Nguyễn RN  Medication Review/Management: medications reviewed     Problem: Pain Acute  Goal: Optimal Pain Control and Function  Outcome: Progressing  Intervention: Prevent or Manage Pain  Recent Flowsheet Documentation  Taken 8/14/2024 2345 by Bon Nguyễn RN  Medication Review/Management: medications reviewed  Intervention: Optimize Psychosocial Wellbeing  Recent Flowsheet Documentation  Taken 8/14/2024 2345 by Bon Nguyễn RN  Supportive Measures:   self-responsibility promoted   self-reflection promoted   self-care encouraged   active listening utilized

## 2024-08-15 NOTE — PROGRESS NOTES
"[facility]    Medicine Progress Note - Hospitalist Service    Date of Admission:  8/11/2024    Assessment & Plan   Allyson Samuel is a 79 year old female who came to attention on  on 8/11/2024 due to \"feeling sick\" and SOB for several days PTA.  She was found to be in acute hypoxic resp failure and was admitted with severe sepsis and acute hypoxic and hypercapnic resp failure due to pneumonia.    Significant PMH includes tobacco abuse without a formal dx of COPD, CAD (hx MI and stenting in 2003), HTN, Macular degeneration.  She underwent left nephrectomy in 2015 for urothelial cancer. The pt apparently has not followed regularly with medical care since about 2019.  Needs to re-establish care.     Imaging: left lower lobe infiltrate with air bronchograms consistent with pneumonia. Otherwise, pt with evident emphysematous changes throughout both lungs.      DX:  Acute hypoxic and hypercapnic resp failure in the setting of evident emphysematous changes in both lungs and evident left lower lobe infiltrate.  Initialy VBG showed pH 7.11 with pCO2 62.    Severe sepsis with lactic acidosis, leukocytosis, mild CYNTHIA.   HFrEF.  Elevated BNP, EF measured at 48%.  Seen by Cardiology and started on recommended HF med therapies. Will add Torsemide today to see if that also seems to help with hypoxia, etc.   Ventricular ectopy v aberrantly conducted a fib. Documented at 0141 on telemetry.   Elevated troponin thought to be due to strain in the setting of acute infection.   Mild CYNTHIA v CKD stage 3a with electrolyte disturbances. (Hyponatremia, low bicarbonate now resolved.) It appears that her kidney function has remained stable through this hospital stay.     HTN.  Making HF more difficult to manage.   DM II. Hyperglycemia exacerbated by steroid exposure.       PLAN:  1.  Cont with empiric Ceftriaxone and Azithromycin. Empirically started prednisone 40 g every day, but this was adjusted up to 60 mg daily.   2.  " "Pulmonary toilet with mucomyst nebs.   3.  Wean O2 as able. Pt managed to wean from BiPAP more than 48 hours ago.  4.  Gloria Long) saw pt on 8/13 and recommended medication regimen for Systolic HF in the setting of CAD.  Stop amlodipine, advance dose of metoprolol, resume lisinopril and start spironolactone along with empagliflozin. Also recommended atorvastatin. Will trial diuretics tomorrow if creat is stable.   5.  Given pt's report of SANCHEZ/chest discomfort, will consider stress testing. She would likely not be a good candidate for anything beyond medical therapy, but it would be worthwhile to know if she has symptomatic CAD.   6.  Start torsemide 5 mg today.   7.  Will need to re-establish primary care on discharge.           Diet: Regular Diet Adult    DVT Prophylaxis: Enoxaparin (Lovenox) SQ  [place] Catheter: Not present  Lines: None     Cardiac Monitoring: None  Code Status: No CPR- Do NOT Intubate      Clinically Significant Risk Factors                  # Hypertension: Noted on problem list           # Obesity: Estimated body mass index is 36.69 kg/m  as calculated from the following:    Height as of this encounter: 1.575 m (5' 2\").    Weight as of this encounter: 91 kg (200 lb 9.6 oz)., PRESENT ON ADMISSION                  Disposition Plan     Medically Ready for Discharge: Anticipated in 2-4 Days   At this time, I believe the patient is not strong enough to manage independently at home.  In a day or 2, I expect she will be stable for discharge.      Nba Mello MD  Hospitalist Service  [facility]  Securely message with [employer] (more info)  Text page via [employer] Paging/Directory   ______________________________________________________________________    Interval History   Stable night. Feeling overall very good.  Improving.     Physical Exam   Vital Signs: Temp: 97.9  F (36.6  C) Temp src: Oral BP: (!) 161/78 Pulse: 68   Resp: 21 SpO2: 95 % O2 Device: Nasal cannula with " humidification Oxygen Delivery: 2 LPM  Weight: 200 lbs 9.6 oz    General Appearance: Alert and comfortable breathing 5 LPM by NC.  Respiratory: Breath sounds are coarse.  Dense wheeze throughout.   Cardiovascular: RRR without murmur  GI: soft, obese  Skin: no lesions. Trace edema.  Other:      Medical Decision Making       40 MINUTES SPENT BY ME on the date of service doing chart review, history, exam, documentation & further activities per the note.      Data   Results for orders placed or performed during the hospital encounter of 08/11/24 (from the past 24 hour(s))   Glucose by meter   Result Value Ref Range    GLUCOSE BY METER POCT 173 (H) 70 - 99 mg/dL   Glucose by meter   Result Value Ref Range    GLUCOSE BY METER POCT 160 (H) 70 - 99 mg/dL   Basic metabolic panel   Result Value Ref Range    Sodium 136 135 - 145 mmol/L    Potassium 4.4 3.4 - 5.3 mmol/L    Chloride 103 98 - 107 mmol/L    Carbon Dioxide (CO2) 22 22 - 29 mmol/L    Anion Gap 11 7 - 15 mmol/L    Urea Nitrogen 28.5 (H) 8.0 - 23.0 mg/dL    Creatinine 1.11 (H) 0.51 - 0.95 mg/dL    GFR Estimate 50 (L) >60 mL/min/1.73m2    Calcium 9.3 8.8 - 10.4 mg/dL    Glucose 110 (H) 70 - 99 mg/dL   Magnesium   Result Value Ref Range    Magnesium 2.2 1.7 - 2.3 mg/dL   Phosphorus   Result Value Ref Range    Phosphorus 3.0 2.5 - 4.5 mg/dL   Glucose by meter   Result Value Ref Range    GLUCOSE BY METER POCT 98 70 - 99 mg/dL   Glucose by meter   Result Value Ref Range    GLUCOSE BY METER POCT 145 (H) 70 - 99 mg/dL   Glucose by meter   Result Value Ref Range    GLUCOSE BY METER POCT 180 (H) 70 - 99 mg/dL

## 2024-08-15 NOTE — PLAN OF CARE
"Goal Outcome Evaluation:      Plan of Care Reviewed With: patient    Overall Patient Progress: improvingOverall Patient Progress: improving    Outcome Evaluation: cont on antibiotics, vss afebrile. oxygen 2l nc. denies pain.      Problem: Adult Inpatient Plan of Care  Goal: Plan of Care Review  Description: The Plan of Care Review/Shift note should be completed every shift.  The Outcome Evaluation is a brief statement about your assessment that the patient is improving, declining, or no change.  This information will be displayed automatically on your shift  note.  Outcome: Progressing  Flowsheets (Taken 8/14/2024 2760)  Outcome Evaluation: cont on antibiotics, vss afebrile. oxygen 2l nc. denies pain.  Plan of Care Reviewed With: patient  Overall Patient Progress: improving  Goal: Patient-Specific Goal (Individualized)  Description: You can add care plan individualizations to a care plan. Examples of Individualization might be:  \"Parent requests to be called daily at 9am for status\", \"I have a hard time hearing out of my right ear\", or \"Do not touch me to wake me up as it startles  me\".  Outcome: Progressing  Goal: Absence of Hospital-Acquired Illness or Injury  Outcome: Progressing  Goal: Optimal Comfort and Wellbeing  Outcome: Progressing  Goal: Readiness for Transition of Care  Outcome: Progressing     Problem: Skin Injury Risk Increased  Goal: Skin Health and Integrity  Outcome: Progressing     Problem: Gas Exchange Impaired  Goal: Optimal Gas Exchange  Outcome: Progressing     Problem: Fall Injury Risk  Goal: Absence of Fall and Fall-Related Injury  Outcome: Progressing     Problem: COPD (Chronic Obstructive Pulmonary Disease)  Goal: Optimal Chronic Illness Coping  Outcome: Progressing  Goal: Optimal Level of Functional [place]  Outcome: Progressing  Goal: Absence of Infection Signs and Symptoms  Outcome: Progressing  Goal: Improved Oral Intake  Outcome: Progressing  Goal: Effective Oxygenation and " Ventilation  Outcome: Progressing

## 2024-08-16 ENCOUNTER — APPOINTMENT (OUTPATIENT)
Dept: OCCUPATIONAL THERAPY | Facility: CLINIC | Age: 79
DRG: 871 | End: 2024-08-16
Payer: MEDICARE

## 2024-08-16 LAB
ANION GAP SERPL CALCULATED.3IONS-SCNC: 12 MMOL/L (ref 7–15)
BACTERIA BLD CULT: NO GROWTH
BUN SERPL-MCNC: 27.2 MG/DL (ref 8–23)
CALCIUM SERPL-MCNC: 9.2 MG/DL (ref 8.8–10.4)
CHLORIDE SERPL-SCNC: 106 MMOL/L (ref 98–107)
CREAT SERPL-MCNC: 1.01 MG/DL (ref 0.51–0.95)
EGFRCR SERPLBLD CKD-EPI 2021: 56 ML/MIN/1.73M2
GLUCOSE BLDC GLUCOMTR-MCNC: 107 MG/DL (ref 70–99)
GLUCOSE BLDC GLUCOMTR-MCNC: 140 MG/DL (ref 70–99)
GLUCOSE BLDC GLUCOMTR-MCNC: 195 MG/DL (ref 70–99)
GLUCOSE BLDC GLUCOMTR-MCNC: 255 MG/DL (ref 70–99)
GLUCOSE BLDC GLUCOMTR-MCNC: 79 MG/DL (ref 70–99)
GLUCOSE SERPL-MCNC: 101 MG/DL (ref 70–99)
HCO3 SERPL-SCNC: 22 MMOL/L (ref 22–29)
MAGNESIUM SERPL-MCNC: 2.1 MG/DL (ref 1.7–2.3)
PHOSPHATE SERPL-MCNC: 3.5 MG/DL (ref 2.5–4.5)
POTASSIUM SERPL-SCNC: 4.4 MMOL/L (ref 3.4–5.3)
SODIUM SERPL-SCNC: 140 MMOL/L (ref 135–145)

## 2024-08-16 PROCEDURE — 250N000009 HC RX 250: Performed by: INTERNAL MEDICINE

## 2024-08-16 PROCEDURE — 120N000001 HC R&B MED SURG/OB

## 2024-08-16 PROCEDURE — 94640 AIRWAY INHALATION TREATMENT: CPT

## 2024-08-16 PROCEDURE — 36415 COLL VENOUS BLD VENIPUNCTURE: CPT | Performed by: INTERNAL MEDICINE

## 2024-08-16 PROCEDURE — 94640 AIRWAY INHALATION TREATMENT: CPT | Mod: 76

## 2024-08-16 PROCEDURE — 250N000013 HC RX MED GY IP 250 OP 250 PS 637: Performed by: INTERNAL MEDICINE

## 2024-08-16 PROCEDURE — 250N000011 HC RX IP 250 OP 636: Performed by: INTERNAL MEDICINE

## 2024-08-16 PROCEDURE — 250N000012 HC RX MED GY IP 250 OP 636 PS 637: Performed by: INTERNAL MEDICINE

## 2024-08-16 PROCEDURE — 999N000157 HC STATISTIC RCP TIME EA 10 MIN

## 2024-08-16 PROCEDURE — 99233 SBSQ HOSP IP/OBS HIGH 50: CPT | Performed by: INTERNAL MEDICINE

## 2024-08-16 PROCEDURE — 80048 BASIC METABOLIC PNL TOTAL CA: CPT | Performed by: INTERNAL MEDICINE

## 2024-08-16 PROCEDURE — 83735 ASSAY OF MAGNESIUM: CPT | Performed by: INTERNAL MEDICINE

## 2024-08-16 PROCEDURE — 84100 ASSAY OF PHOSPHORUS: CPT | Performed by: INTERNAL MEDICINE

## 2024-08-16 PROCEDURE — 97530 THERAPEUTIC ACTIVITIES: CPT | Mod: GO

## 2024-08-16 RX ORDER — PREDNISONE 20 MG/1
40 TABLET ORAL DAILY
Status: DISCONTINUED | OUTPATIENT
Start: 2024-08-17 | End: 2024-08-17 | Stop reason: HOSPADM

## 2024-08-16 RX ADMIN — LISINOPRIL 10 MG: 10 TABLET ORAL at 09:08

## 2024-08-16 RX ADMIN — SPIRONOLACTONE 25 MG: 25 TABLET, FILM COATED ORAL at 09:07

## 2024-08-16 RX ADMIN — METOPROLOL SUCCINATE 50 MG: 50 TABLET, EXTENDED RELEASE ORAL at 09:07

## 2024-08-16 RX ADMIN — ATORVASTATIN CALCIUM 40 MG: 40 TABLET, FILM COATED ORAL at 20:02

## 2024-08-16 RX ADMIN — AZITHROMYCIN DIHYDRATE 250 MG: 250 TABLET ORAL at 09:08

## 2024-08-16 RX ADMIN — ALBUTEROL SULFATE 2.5 MG: 2.5 SOLUTION RESPIRATORY (INHALATION) at 08:00

## 2024-08-16 RX ADMIN — ALBUTEROL SULFATE 2.5 MG: 2.5 SOLUTION RESPIRATORY (INHALATION) at 19:20

## 2024-08-16 RX ADMIN — ALBUTEROL SULFATE 2.5 MG: 2.5 SOLUTION RESPIRATORY (INHALATION) at 15:41

## 2024-08-16 RX ADMIN — ACETYLCYSTEINE 4 ML: 100 SOLUTION ORAL; RESPIRATORY (INHALATION) at 15:41

## 2024-08-16 RX ADMIN — ACETYLCYSTEINE 4 ML: 100 SOLUTION ORAL; RESPIRATORY (INHALATION) at 11:43

## 2024-08-16 RX ADMIN — PREDNISONE 60 MG: 20 TABLET ORAL at 09:07

## 2024-08-16 RX ADMIN — ACETYLCYSTEINE 4 ML: 100 SOLUTION ORAL; RESPIRATORY (INHALATION) at 19:20

## 2024-08-16 RX ADMIN — ACETYLCYSTEINE 4 ML: 100 SOLUTION ORAL; RESPIRATORY (INHALATION) at 08:01

## 2024-08-16 RX ADMIN — CEFTRIAXONE 2 G: 2 INJECTION, POWDER, FOR SOLUTION INTRAMUSCULAR; INTRAVENOUS at 20:03

## 2024-08-16 RX ADMIN — ENOXAPARIN SODIUM 40 MG: 40 INJECTION SUBCUTANEOUS at 09:10

## 2024-08-16 RX ADMIN — TORSEMIDE 5 MG: 5 TABLET ORAL at 09:06

## 2024-08-16 RX ADMIN — ALBUTEROL SULFATE 2.5 MG: 2.5 SOLUTION RESPIRATORY (INHALATION) at 11:43

## 2024-08-16 RX ADMIN — ASPIRIN 81 MG: 81 TABLET, COATED ORAL at 09:08

## 2024-08-16 ASSESSMENT — ACTIVITIES OF DAILY LIVING (ADL)
ADLS_ACUITY_SCORE: 35
ADLS_ACUITY_SCORE: 34
ADLS_ACUITY_SCORE: 34
ADLS_ACUITY_SCORE: 35
ADLS_ACUITY_SCORE: 34
ADLS_ACUITY_SCORE: 35
ADLS_ACUITY_SCORE: 32
ADLS_ACUITY_SCORE: 34
ADLS_ACUITY_SCORE: 32
ADLS_ACUITY_SCORE: 34
ADLS_ACUITY_SCORE: 34
ADLS_ACUITY_SCORE: 32
ADLS_ACUITY_SCORE: 35
ADLS_ACUITY_SCORE: 32
ADLS_ACUITY_SCORE: 34
ADLS_ACUITY_SCORE: 32
ADLS_ACUITY_SCORE: 34
ADLS_ACUITY_SCORE: 34
ADLS_ACUITY_SCORE: 35

## 2024-08-16 NOTE — PLAN OF CARE
"Pt. A&Ox4, up with SBA, O2 at 2L with sat's at 97%. BGL WK=108, 0572=642, lung sounds diminished, coarse with exp. Wheezes. Continues on abx, prednisone and nebs. Pt. Denies any needs at this time. Will continue with POC.    Goal Outcome Evaluation:      Plan of Care Reviewed With: patient    Overall Patient Progress: no changeOverall Patient Progress: no change    Outcome Evaluation: Pt. continues on abx (Ceftriaxone and azithromycin) for PNA, torsemide given yesterday for diuresis and continues on prednisone. OT to work with patient on stairs today.      Problem: Adult Inpatient Plan of Care  Goal: Plan of Care Review  Description: The Plan of Care Review/Shift note should be completed every shift.  The Outcome Evaluation is a brief statement about your assessment that the patient is improving, declining, or no change.  This information will be displayed automatically on your shift  note.  Outcome: Progressing  Flowsheets (Taken 8/16/2024 0648)  Outcome Evaluation: Pt. continues on abx (Ceftriaxone and azithromycin) for PNA, torsemide given yesterday for diuresis and continues on prednisone. OT to work with patient on stairs today.  Plan of Care Reviewed With: patient  Overall Patient Progress: no change  Goal: Patient-Specific Goal (Individualized)  Description: You can add care plan individualizations to a care plan. Examples of Individualization might be:  \"Parent requests to be called daily at 9am for status\", \"I have a hard time hearing out of my right ear\", or \"Do not touch me to wake me up as it startles  me\".  Outcome: Progressing  Goal: Absence of Hospital-Acquired Illness or Injury  Outcome: Progressing  Intervention: Identify and Manage Fall Risk  Recent Flowsheet Documentation  Taken 8/15/2024 2348 by Tamar Kim, RN  Safety Promotion/Fall Prevention: activity supervised  Taken 8/15/2024 2042 by Tamar Kim, RN  Safety Promotion/Fall Prevention:   activity supervised   clutter free environment " maintained   increased rounding and observation   increase visualization of patient   lighting adjusted   mobility aid in reach   nonskid shoes/slippers when out of bed   safety round/check completed  Intervention: Prevent Skin Injury  Recent Flowsheet Documentation  Taken 8/15/2024 2348 by Tamar Kim RN  Body Position: position changed independently  Device Skin Pressure Protection: absorbent pad utilized/changed  Taken 8/15/2024 2042 by Tamar Kim RN  Body Position: position changed independently  Device Skin Pressure Protection: absorbent pad utilized/changed  Intervention: Prevent and Manage VTE (Venous Thromboembolism) Risk  Recent Flowsheet Documentation  Taken 8/15/2024 2348 by Tamar Kim RN  VTE Prevention/Management: SCDs off (sequential compression devices)  Taken 8/15/2024 2042 by Tamar Kim RN  VTE Prevention/Management: SCDs off (sequential compression devices)  Intervention: Prevent Infection  Recent Flowsheet Documentation  Taken 8/15/2024 2348 by Tamar Kim RN  Infection Prevention:   rest/sleep promoted   hand hygiene promoted  Taken 8/15/2024 2042 by Tamar Kim RN  Infection Prevention: hand hygiene promoted  Goal: Optimal Comfort and Wellbeing  Outcome: Progressing  Goal: Readiness for Transition of Care  Outcome: Progressing     Problem: Skin Injury Risk Increased  Goal: Skin Health and Integrity  Outcome: Progressing  Intervention: Plan: Nurse Driven Intervention: Moisture Management  Recent Flowsheet Documentation  Taken 8/15/2024 2348 by Tamar Kim RN  Bathing/Skin Care: incontinence care  Plan: Moisture Management: encourage regular toileting  Taken 8/15/2024 2042 by Tamar Kim RN  Moisture Interventions: Encourage regular toileting  Bathing/Skin Care: incontinence care  Plan: Moisture Management:   encourage regular toileting   incontinence pad  Intervention: Optimize Skin Protection  Recent Flowsheet Documentation  Taken 8/15/2024 2348 by  Kim, Tamar C, RN  Activity Management: activity adjusted per tolerance  Taken 8/15/2024 2042 by Tamar Kim RN  Activity Management: activity adjusted per tolerance     Problem: Gas Exchange Impaired  Goal: Optimal Gas Exchange  Outcome: Progressing  Intervention: Optimize Oxygenation and Ventilation  Recent Flowsheet Documentation  Taken 8/15/2024 2348 by Tamar Kim RN  Airway/Ventilation Management: airway patency maintained  Taken 8/15/2024 2042 by Tamar Kim RN  Airway/Ventilation Management: airway patency maintained     Problem: Fall Injury Risk  Goal: Absence of Fall and Fall-Related Injury  Outcome: Progressing  Intervention: Identify and Manage Contributors  Recent Flowsheet Documentation  Taken 8/15/2024 2348 by Tamar Kim RN  Medication Review/Management: medications reviewed  Taken 8/15/2024 2042 by Tamar Kim RN  Medication Review/Management: medications reviewed  Intervention: Promote Injury-Free Environment  Recent Flowsheet Documentation  Taken 8/15/2024 2348 by Tamar Kim RN  Safety Promotion/Fall Prevention: activity supervised  Taken 8/15/2024 2042 by Tamar Kim RN  Safety Promotion/Fall Prevention:   activity supervised   clutter free environment maintained   increased rounding and observation   increase visualization of patient   lighting adjusted   mobility aid in reach   nonskid shoes/slippers when out of bed   safety round/check completed     Problem: COPD (Chronic Obstructive Pulmonary Disease)  Goal: Optimal Chronic Illness Coping  Outcome: Progressing  Intervention: Support and Optimize Psychosocial Response  Recent Flowsheet Documentation  Taken 8/15/2024 2348 by Tamar Kim RN  Supportive Measures: active listening utilized  Taken 8/15/2024 2042 by Tamar Kim RN  Supportive Measures: active listening utilized  Goal: Optimal Level of Functional [place]  Outcome: Progressing  Intervention: Optimize Functional Ability  Recent  Flowsheet Documentation  Taken 8/15/2024 2348 by Tamar Kim RN  Activity Management: activity adjusted per tolerance  Environmental Support: calm environment promoted  Taken 8/15/2024 2042 by Tamar Kim RN  Activity Management: activity adjusted per tolerance  Environmental Support: calm environment promoted  Goal: Absence of Infection Signs and Symptoms  Outcome: Progressing  Goal: Improved Oral Intake  Outcome: Progressing  Goal: Effective Oxygenation and Ventilation  Outcome: Progressing  Intervention: Promote Airway Secretion Clearance  Recent Flowsheet Documentation  Taken 8/15/2024 2348 by Tamar Kim RN  Breathing Techniques/Airway Clearance: deep/controlled cough encouraged  Cough And Deep Breathing: done independently per patient  Activity Management: activity adjusted per tolerance  Taken 8/15/2024 2042 by Tamar Kim RN  Breathing Techniques/Airway Clearance: deep/controlled cough encouraged  Cough And Deep Breathing: done independently per patient  Activity Management: activity adjusted per tolerance  Intervention: Optimize Oxygenation and Ventilation  Recent Flowsheet Documentation  Taken 8/15/2024 2348 by Tamar Kim RN  Airway/Ventilation Management: airway patency maintained  Fluid/Electrolyte Management: fluids provided  Taken 8/15/2024 2042 by Tamar Kim RN  Airway/Ventilation Management: airway patency maintained  Fluid/Electrolyte Management: fluids provided     Problem: Comorbidity Management  Goal: Maintenance of Asthma Control  Outcome: Progressing  Intervention: Maintain Asthma Symptom Control  Recent Flowsheet Documentation  Taken 8/15/2024 2348 by Tamar Kim RN  Medication Review/Management: medications reviewed  Taken 8/15/2024 2042 by Tamar Kim RN  Medication Review/Management: medications reviewed  Goal: Maintenance of Behavioral Health Symptom Control  Outcome: Progressing  Intervention: Maintain Behavioral Health Symptom Control  Recent  Flowsheet Documentation  Taken 8/15/2024 2348 by Tamar Kim RN  Medication Review/Management: medications reviewed  Taken 8/15/2024 2042 by Tamar Kim RN  Medication Review/Management: medications reviewed  Goal: Maintenance of COPD Symptom Control  Outcome: Progressing  Intervention: Maintain COPD Symptom Control  Recent Flowsheet Documentation  Taken 8/15/2024 2348 by Tamar Kim RN  Supportive Measures: active listening utilized  Medication Review/Management: medications reviewed  Taken 8/15/2024 2042 by Tamar Kim RN  Supportive Measures: active listening utilized  Medication Review/Management: medications reviewed  Goal: Blood Glucose Levels Within Targeted Range  Outcome: Progressing  Intervention: Monitor and Manage Glycemia  Recent Flowsheet Documentation  Taken 8/15/2024 2348 by Tamar Kim RN  Medication Review/Management: medications reviewed  Taken 8/15/2024 2042 by Tamar Kim RN  Medication Review/Management: medications reviewed  Goal: Maintenance of Heart Failure Symptom Control  Outcome: Progressing  Intervention: Maintain Heart Failure Management  Recent Flowsheet Documentation  Taken 8/15/2024 2348 by Tamar Kim RN  Medication Review/Management: medications reviewed  Taken 8/15/2024 2042 by Tamar Kim RN  Medication Review/Management: medications reviewed  Goal: Blood Pressure in Desired Range  Outcome: Progressing  Intervention: Maintain Blood Pressure Management  Recent Flowsheet Documentation  Taken 8/15/2024 2348 by Tamar Kim RN  Medication Review/Management: medications reviewed  Taken 8/15/2024 2042 by Tamar Kim RN  Medication Review/Management: medications reviewed  Goal: Maintenance of Osteoarthritis Symptom Control  Outcome: Progressing  Intervention: Maintain Osteoarthritis Symptom Control  Recent Flowsheet Documentation  Taken 8/15/2024 2348 by Tamar Kim RN  Assistive Device Utilized:   walker   gait belt  Activity  Management: activity adjusted per tolerance  Medication Review/Management: medications reviewed  Taken 8/15/2024 2042 by Tamar Kim RN  Assistive Device Utilized:   walker   gait belt  Activity Management: activity adjusted per tolerance  Medication Review/Management: medications reviewed  Goal: Bariatric Home Regimen Maintained  Outcome: Progressing  Intervention: Maintain and Manage Postbariatric Surgery Care  Recent Flowsheet Documentation  Taken 8/15/2024 2348 by Tamar Kim RN  Medication Review/Management: medications reviewed  Taken 8/15/2024 2042 by Tamar Kim RN  Medication Review/Management: medications reviewed  Goal: Maintenance of Seizure Control  Outcome: Progressing  Intervention: Maintain Seizure Symptom Control  Recent Flowsheet Documentation  Taken 8/15/2024 2348 by Tamar Kim RN  Sensory Stimulation Regulation:   care clustered   lighting decreased   quiet environment promoted  Medication Review/Management: medications reviewed  Taken 8/15/2024 2042 by Tamar Kim RN  Sensory Stimulation Regulation:   care clustered   lighting decreased  Medication Review/Management: medications reviewed     Problem: Pain Acute  Goal: Optimal Pain Control and Function  Outcome: Progressing  Intervention: Prevent or Manage Pain  Recent Flowsheet Documentation  Taken 8/15/2024 2348 by Tamar Kim RN  Sensory Stimulation Regulation:   care clustered   lighting decreased   quiet environment promoted  Sleep/Rest Enhancement: awakenings minimized  Medication Review/Management: medications reviewed  Taken 8/15/2024 2042 by Tamar Kim RN  Sensory Stimulation Regulation:   care clustered   lighting decreased  Sleep/Rest Enhancement: awakenings minimized  Medication Review/Management: medications reviewed  Intervention: Optimize Psychosocial Wellbeing  Recent Flowsheet Documentation  Taken 8/15/2024 2348 by Tamar Kim RN  Supportive Measures: active listening utilized  Taken  8/15/2024 2042 by Tamar Kim, RN  Supportive Measures: active listening utilized

## 2024-08-16 NOTE — PROGRESS NOTES
"[facility]    Medicine Progress Note - Hospitalist Service    Date of Admission:  8/11/2024    Assessment & Plan   Allyson Samuel is a 79 year old female who came to attention on  on 8/11/2024 due to \"feeling sick\" and SOB for several days PTA.  She was found to be in acute hypoxic resp failure and was admitted with severe sepsis and acute hypoxic and hypercapnic resp failure due to pneumonia.    Significant PMH includes tobacco abuse without a formal dx of COPD, CAD (hx MI and stenting in 2003), HTN, Macular degeneration.  She underwent left nephrectomy in 2015 for urothelial cancer. The pt apparently has not followed regularly with medical care since about 2019.  Needs to re-establish care.     Imaging: left lower lobe infiltrate with air bronchograms consistent with pneumonia. Otherwise, pt with evident emphysematous changes throughout both lungs.      DX:  Acute hypoxic and hypercapnic resp failure in the setting of evident emphysematous changes in both lungs and evident left lower lobe infiltrate.  Initialy VBG showed pH 7.11 with pCO2 62.    Severe sepsis with lactic acidosis, leukocytosis, mild CYNTHIA.   Acute HFrEF.  Elevated BNP, EF measured at 48%.  Seen by Cardiology and started on recommended HF med therapies. Will add Torsemide today to see if that also seems to help with hypoxia, etc.   Ventricular ectopy v aberrantly conducted a fib. Documented at 0141 on telemetry.   Elevated troponin thought to be due to strain in the setting of acute infection.   Mild CYNTHIA v CKD stage 3a with electrolyte disturbances. (Hyponatremia, low bicarbonate now resolved.) It appears that her kidney function has remained stable through this hospital stay.     HTN.  Making HF more difficult to manage.   DM II. Hyperglycemia exacerbated by steroid exposure.   Physical deconditioning      PLAN:  1.  Cont with empiric Ceftriaxone and Azithromycin.   -Change prednisone to 40 mg daily starting tomorrow   -Continued " "on breathing treatments   -Monitor loose stooling   -She mentioned that she will try her very best to do smoking cessation   -Will need close follow-up as outpatient and may also benefit from pulmonary function testing if agreeable   -Torsemide started earlier  -  2.  Pulmonary toilet with mucomyst nebs.   3.  Wean O2 as able. Pt managed to wean from BiPAP more than 48 hours ago.  4.  Gloria Long) saw pt on 8/13 and recommended medication regimen for Systolic HF in the setting of CAD.  Stop amlodipine, advance dose of metoprolol, resume lisinopril and start spironolactone along with empagliflozin. Also recommended atorvastatin.   -Cardiology offered further workup and restratification patient politely declined further intervention.  Will benefit for cardiology and core clinic follow-up if agreeable  -Blood pressures permitting with current beta-blockers and ACE inhibitors    5.  Given pt's report of SANCHEZ/chest discomfort, will consider stress testing. She would likely not be a good candidate for anything beyond medical therapy, but it would be worthwhile to know if she has symptomatic CAD..  -See earlier discussion regarding patient opting not to further pursue cardiac workup    6.  Start torsemide 5 mg today.   7.  Will need to re-establish primary care on discharge.   -Highly appreciate getting samples from therapy services        Diet: Regular Diet Adult    DVT Prophylaxis: Enoxaparin (Lovenox) SQ  Prado Catheter: Not present  Lines: None     Cardiac Monitoring: None  Code Status: No CPR- Do NOT Intubate      Clinically Significant Risk Factors                  # Hypertension: Noted on problem list           # Obesity: Estimated body mass index is 36.36 kg/m  as calculated from the following:    Height as of this encounter: 1.575 m (5' 2\").    Weight as of this encounter: 90.2 kg (198 lb 12.8 oz).                   Disposition Plan     Medically Ready for Discharge: Anticipating at least in the next 2 more " days             Hector Hood MD, MD  Hospitalist Service  [facility]  Securely message with [employer] (more info)  Text page via [employer] Paging/Directory   ______________________________________________________________________    Interval History   I assumed medicine service care today.  Seen and examined.  Chart reviewed.  I met Ms. Valadez this morning while she is eating and sitting comfortably in the hospital chair.  She appears to be in good spirits.  She is very much conversational and interactive.  She is happy that she was able to get her better sleep overnight.  She is having some loose stooling but denies any bloody stools, abdominal pain, cramps or nausea or vomiting.  She feels significantly improved terms of her shortness of breath.  Still having some intermittent cough but denies any chest pain, confusion, agitation and remained afebrile.    Physical Exam   Vital Signs: Temp: 97.3  F (36.3  C) Temp src: Oral BP: (!) 151/70 Pulse: 65   Resp: 18 SpO2: 94 % O2 Device: Nasal cannula with humidification Oxygen Delivery: 2 LPM  Weight: 198 lbs 12.8 oz    HEENT; Atraumatic, normocephalic, pinkish conjuctiva, pupils bilateral reactive   Skin: warm and moist, no rashes  Lungs: equal chest expansion, clear to auscultation, scant wheezes  Heart: normal rate, normal rhythm, no rubs or gallops.   Abdomen: normal bowel sounds, no tenderness, no peritoneal signs, no guarding  Extremities: no deformities, bilateral nonpitting lower extremity edema   Neuro; follow commands, alert and oriented x3, spontaneous speech, coherent, moves all extremities spontaneously  Psych; no hallucination, euthymic mood, not agitated      Medical Decision Making       50 MINUTES SPENT BY ME on the date of service doing chart review, history, exam, documentation & further activities per the note.  MANAGEMENT DISCUSSED with the following over the past 24 hours: Yes   NOTE(S)/MEDICAL RECORDS REVIEWED over the past 24  hours: Yes       Data     I have personally reviewed the following data over the past 24 hrs:    N/A  \   N/A   / N/A     140 106 27.2 (H) /  79   4.4 22 1.01 (H) \       Imaging results reviewed over the past 24 hrs:   No results found for this or any previous visit (from the past 24 hour(s)).

## 2024-08-16 NOTE — PLAN OF CARE
"Temp: 98  F (36.7  C) Temp src: Oral BP: (!) 159/65 Pulse: 64   Resp: 20 SpO2: 96 % O2 Device: Nasal cannula with humidification Oxygen Delivery: 2 LPM    A&Ox4, denies pain. Satting in mid 90's on 2L O2. LPIV SL. Reg diet-good appetite. ACHS with sliding scale, but refuses insulin. SBA up to bathroom numerous times. 2X diarrhea on shift. Scheduled nebs. PRN hydralazine for BP over 180 systolic. Possible discharge tomorrow if OT signs off.            Goal Outcome Evaluation:      Plan of Care Reviewed With: patient    Overall Patient Progress: improvingOverall Patient Progress: improving    Outcome Evaluation: 2L O2, denies pain, SOB, N/v. Bg levels in and out of range, both high and low-refuses insulin. Ambulated hallway with OT per provider request.      Problem: Adult Inpatient Plan of Care  Goal: Plan of Care Review  Description: The Plan of Care Review/Shift note should be completed every shift.  The Outcome Evaluation is a brief statement about your assessment that the patient is improving, declining, or no change.  This information will be displayed automatically on your shift  note.  Outcome: Progressing  Flowsheets (Taken 8/16/2024 1833)  Outcome Evaluation: 2L O2, denies pain, SOB, N/v. Bg levels in and out of range, both high and low-refuses insulin. Ambulated hallway with OT per provider request.  Plan of Care Reviewed With: patient  Overall Patient Progress: improving  Goal: Patient-Specific Goal (Individualized)  Description: You can add care plan individualizations to a care plan. Examples of Individualization might be:  \"Parent requests to be called daily at 9am for status\", \"I have a hard time hearing out of my right ear\", or \"Do not touch me to wake me up as it startles  me\".  Outcome: Progressing  Goal: Absence of Hospital-Acquired Illness or Injury  Outcome: Progressing  Intervention: Identify and Manage Fall Risk  Recent Flowsheet Documentation  Taken 8/16/2024 1714 by Edgar Irby, " RN  Safety Promotion/Fall Prevention: safety round/check completed  Taken 8/16/2024 0918 by Edgar Irby RN  Safety Promotion/Fall Prevention:   activity supervised   safety round/check completed  Intervention: Prevent Skin Injury  Recent Flowsheet Documentation  Taken 8/16/2024 1714 by Edgar Irby RN  Body Position: position changed independently  Taken 8/16/2024 0918 by Edgar Irby RN  Body Position: position changed independently  Intervention: Prevent and Manage VTE (Venous Thromboembolism) Risk  Recent Flowsheet Documentation  Taken 8/16/2024 0918 by Edgar Irby RN  VTE Prevention/Management: SCDs off (sequential compression devices)  Intervention: Prevent Infection  Recent Flowsheet Documentation  Taken 8/16/2024 0918 by Edgar Irby RN  Infection Prevention: rest/sleep promoted  Goal: Optimal Comfort and Wellbeing  Outcome: Progressing  Intervention: Provide Person-Centered Care  Recent Flowsheet Documentation  Taken 8/16/2024 0918 by Edgar Irby RN  Trust Relationship/Rapport: care explained  Goal: Readiness for Transition of Care  Outcome: Progressing     Problem: Skin Injury Risk Increased  Goal: Skin Health and Integrity  Outcome: Progressing  Intervention: Plan: Nurse Driven Intervention: Moisture Management  Recent Flowsheet Documentation  Taken 8/16/2024 0918 by Edgar Irby RN  Moisture Interventions:   Encourage regular toileting   Incontinence pad  Intervention: Optimize Skin Protection  Recent Flowsheet Documentation  Taken 8/16/2024 1714 by Edgar Irby RN  Activity Management: activity adjusted per tolerance  Head of Bed (HOB) Positioning: HOB at 30-45 degrees  Taken 8/16/2024 0918 by Edgar Irby RN  Activity Management: activity adjusted per tolerance  Head of Bed (HOB) Positioning: HOB at 30-45 degrees     Problem: Gas Exchange Impaired  Goal: Optimal Gas Exchange  Outcome: Progressing  Intervention: Optimize Oxygenation and Ventilation  Recent  Flowsheet Documentation  Taken 8/16/2024 1714 by Edgar Irby RN  Head of Bed ([facility]) Positioning: HOB at 30-45 degrees  Taken 8/16/2024 0918 by Edgar Irby RN  Head of Bed ([facility]) Positioning: HOB at 30-45 degrees     Problem: Fall Injury Risk  Goal: Absence of Fall and Fall-Related Injury  Outcome: Progressing  Intervention: Identify and Manage Contributors  Recent Flowsheet Documentation  Taken 8/16/2024 0918 by Edgar Irby RN  Medication Review/Management: medications reviewed  Intervention: Promote Injury-Free Environment  Recent Flowsheet Documentation  Taken 8/16/2024 1714 by Edgar Irby RN  Safety Promotion/Fall Prevention: safety round/check completed  Taken 8/16/2024 0918 by Edgar Irby RN  Safety Promotion/Fall Prevention:   activity supervised   safety round/check completed     Problem: COPD (Chronic Obstructive Pulmonary Disease)  Goal: Optimal Chronic Illness Coping  Outcome: Progressing  Goal: Optimal Level of Functional [place]  Outcome: Progressing  Intervention: Optimize Functional Ability  Recent Flowsheet Documentation  Taken 8/16/2024 1714 by Edgar Irby RN  Activity Management: activity adjusted per tolerance  Taken 8/16/2024 0918 by Edgar Irby RN  Activity Management: activity adjusted per tolerance  Goal: Absence of Infection Signs and Symptoms  Outcome: Progressing  Goal: Improved Oral Intake  Outcome: Progressing  Goal: Effective Oxygenation and Ventilation  Outcome: Progressing  Intervention: Promote Airway Secretion Clearance  Recent Flowsheet Documentation  Taken 8/16/2024 1714 by Edgar Irby RN  Activity Management: activity adjusted per tolerance  Taken 8/16/2024 0918 by Edgar Irby RN  Cough And Deep Breathing: done independently per patient  Activity Management: activity adjusted per tolerance  Intervention: Optimize Oxygenation and Ventilation  Recent Flowsheet Documentation  Taken 8/16/2024 1714 by Edgar Irby RN  Head of  Bed (HOB) Positioning: HOB at 30-45 degrees  Taken 8/16/2024 0918 by Edgar Irby RN  Head of Bed (HOB) Positioning: HOB at 30-45 degrees     Problem: Comorbidity Management  Goal: Maintenance of Asthma Control  Outcome: Progressing  Intervention: Maintain Asthma Symptom Control  Recent Flowsheet Documentation  Taken 8/16/2024 0918 by Edgar Irby RN  Medication Review/Management: medications reviewed  Goal: Maintenance of Behavioral Health Symptom Control  Outcome: Progressing  Intervention: Maintain Behavioral Health Symptom Control  Recent Flowsheet Documentation  Taken 8/16/2024 0918 by Edgar Irby RN  Medication Review/Management: medications reviewed  Goal: Maintenance of COPD Symptom Control  Outcome: Progressing  Intervention: Maintain COPD Symptom Control  Recent Flowsheet Documentation  Taken 8/16/2024 0918 by Edgar Irby RN  Medication Review/Management: medications reviewed  Goal: Blood Glucose Levels Within Targeted Range  Outcome: Progressing  Intervention: Monitor and Manage Glycemia  Recent Flowsheet Documentation  Taken 8/16/2024 0918 by Edgar Irby RN  Medication Review/Management: medications reviewed  Goal: Maintenance of Heart Failure Symptom Control  Outcome: Progressing  Intervention: Maintain Heart Failure Management  Recent Flowsheet Documentation  Taken 8/16/2024 0918 by Edgar Irby RN  Medication Review/Management: medications reviewed  Goal: Blood Pressure in Desired Range  Outcome: Progressing  Intervention: Maintain Blood Pressure Management  Recent Flowsheet Documentation  Taken 8/16/2024 0918 by Edgar Irby RN  Medication Review/Management: medications reviewed  Goal: Maintenance of Osteoarthritis Symptom Control  Outcome: Progressing  Intervention: Maintain Osteoarthritis Symptom Control  Recent Flowsheet Documentation  Taken 8/16/2024 1714 by Edgar Irby RN  Assistive Device Utilized: walker  Activity Management: activity adjusted per  tolerance  Taken 8/16/2024 0918 by Edgar Irby RN  Assistive Device Utilized: walker  Activity Management: activity adjusted per tolerance  Medication Review/Management: medications reviewed  Goal: Bariatric Home Regimen Maintained  Outcome: Progressing  Intervention: Maintain and Manage Postbariatric Surgery Care  Recent Flowsheet Documentation  Taken 8/16/2024 0918 by Edgar Irby RN  Medication Review/Management: medications reviewed  Goal: Maintenance of Seizure Control  Outcome: Progressing  Intervention: Maintain Seizure Symptom Control  Recent Flowsheet Documentation  Taken 8/16/2024 0918 by Edgar Irby RN  Medication Review/Management: medications reviewed     Problem: Pain Acute  Goal: Optimal Pain Control and Function  Outcome: Progressing  Intervention: Prevent or Manage Pain  Recent Flowsheet Documentation  Taken 8/16/2024 0918 by Edgar Irby RN  Medication Review/Management: medications reviewed

## 2024-08-17 ENCOUNTER — APPOINTMENT (OUTPATIENT)
Dept: OCCUPATIONAL THERAPY | Facility: CLINIC | Age: 79
DRG: 871 | End: 2024-08-17
Payer: MEDICARE

## 2024-08-17 VITALS
HEIGHT: 62 IN | WEIGHT: 196.9 LBS | RESPIRATION RATE: 18 BRPM | TEMPERATURE: 98.1 F | DIASTOLIC BLOOD PRESSURE: 90 MMHG | HEART RATE: 68 BPM | OXYGEN SATURATION: 90 % | SYSTOLIC BLOOD PRESSURE: 179 MMHG | BODY MASS INDEX: 36.23 KG/M2

## 2024-08-17 LAB
GLUCOSE BLDC GLUCOMTR-MCNC: 84 MG/DL (ref 70–99)
GLUCOSE BLDC GLUCOMTR-MCNC: 90 MG/DL (ref 70–99)
MAGNESIUM SERPL-MCNC: 2.1 MG/DL (ref 1.7–2.3)
PHOSPHATE SERPL-MCNC: 3.9 MG/DL (ref 2.5–4.5)
PLATELET # BLD AUTO: 191 10E3/UL (ref 150–450)
POTASSIUM SERPL-SCNC: 4.4 MMOL/L (ref 3.4–5.3)

## 2024-08-17 PROCEDURE — 250N000011 HC RX IP 250 OP 636: Performed by: INTERNAL MEDICINE

## 2024-08-17 PROCEDURE — 999N000157 HC STATISTIC RCP TIME EA 10 MIN

## 2024-08-17 PROCEDURE — 36415 COLL VENOUS BLD VENIPUNCTURE: CPT | Performed by: INTERNAL MEDICINE

## 2024-08-17 PROCEDURE — 94640 AIRWAY INHALATION TREATMENT: CPT

## 2024-08-17 PROCEDURE — 250N000013 HC RX MED GY IP 250 OP 250 PS 637: Performed by: INTERNAL MEDICINE

## 2024-08-17 PROCEDURE — 99239 HOSP IP/OBS DSCHRG MGMT >30: CPT | Performed by: INTERNAL MEDICINE

## 2024-08-17 PROCEDURE — 84100 ASSAY OF PHOSPHORUS: CPT | Performed by: INTERNAL MEDICINE

## 2024-08-17 PROCEDURE — 250N000009 HC RX 250: Performed by: INTERNAL MEDICINE

## 2024-08-17 PROCEDURE — 94640 AIRWAY INHALATION TREATMENT: CPT | Mod: 76

## 2024-08-17 PROCEDURE — 84132 ASSAY OF SERUM POTASSIUM: CPT | Performed by: INTERNAL MEDICINE

## 2024-08-17 PROCEDURE — 83735 ASSAY OF MAGNESIUM: CPT | Performed by: INTERNAL MEDICINE

## 2024-08-17 PROCEDURE — 97530 THERAPEUTIC ACTIVITIES: CPT | Mod: GO

## 2024-08-17 PROCEDURE — 250N000012 HC RX MED GY IP 250 OP 636 PS 637: Performed by: INTERNAL MEDICINE

## 2024-08-17 PROCEDURE — 85049 AUTOMATED PLATELET COUNT: CPT | Performed by: INTERNAL MEDICINE

## 2024-08-17 RX ORDER — TORSEMIDE 5 MG/1
5 TABLET ORAL DAILY
Qty: 30 TABLET | Refills: 0 | Status: SHIPPED | OUTPATIENT
Start: 2024-08-17 | End: 2024-09-12

## 2024-08-17 RX ORDER — ALBUTEROL SULFATE 90 UG/1
2 AEROSOL, METERED RESPIRATORY (INHALATION) EVERY 6 HOURS PRN
Qty: 18 G | Refills: 0 | Status: SHIPPED | OUTPATIENT
Start: 2024-08-17

## 2024-08-17 RX ORDER — METOPROLOL SUCCINATE 50 MG/1
50 TABLET, EXTENDED RELEASE ORAL DAILY
Qty: 30 TABLET | Refills: 0 | Status: SHIPPED | OUTPATIENT
Start: 2024-08-17 | End: 2024-09-12

## 2024-08-17 RX ORDER — LISINOPRIL 20 MG/1
20 TABLET ORAL DAILY
Status: DISCONTINUED | OUTPATIENT
Start: 2024-08-17 | End: 2024-08-17 | Stop reason: HOSPADM

## 2024-08-17 RX ORDER — ATORVASTATIN CALCIUM 40 MG/1
40 TABLET, FILM COATED ORAL EVERY EVENING
Qty: 30 TABLET | Refills: 0 | Status: SHIPPED | OUTPATIENT
Start: 2024-08-17 | End: 2024-09-12

## 2024-08-17 RX ORDER — PREDNISONE 20 MG/1
TABLET ORAL
Qty: 9 TABLET | Refills: 0 | Status: SHIPPED | OUTPATIENT
Start: 2024-08-17 | End: 2024-08-25

## 2024-08-17 RX ORDER — SPIRONOLACTONE 25 MG/1
25 TABLET ORAL DAILY
Qty: 30 TABLET | Refills: 0 | Status: SHIPPED | OUTPATIENT
Start: 2024-08-17 | End: 2024-09-12

## 2024-08-17 RX ORDER — IPRATROPIUM BROMIDE AND ALBUTEROL SULFATE 2.5; .5 MG/3ML; MG/3ML
1 SOLUTION RESPIRATORY (INHALATION) EVERY 6 HOURS PRN
Qty: 90 ML | Refills: 0 | Status: SHIPPED | OUTPATIENT
Start: 2024-08-17

## 2024-08-17 RX ORDER — LISINOPRIL 20 MG/1
20 TABLET ORAL DAILY
Qty: 30 TABLET | Refills: 0 | Status: SHIPPED | OUTPATIENT
Start: 2024-08-17 | End: 2024-09-12

## 2024-08-17 RX ADMIN — ASPIRIN 81 MG: 81 TABLET, COATED ORAL at 08:22

## 2024-08-17 RX ADMIN — TORSEMIDE 5 MG: 5 TABLET ORAL at 08:22

## 2024-08-17 RX ADMIN — ENOXAPARIN SODIUM 40 MG: 40 INJECTION SUBCUTANEOUS at 08:25

## 2024-08-17 RX ADMIN — ALBUTEROL SULFATE 2.5 MG: 2.5 SOLUTION RESPIRATORY (INHALATION) at 08:00

## 2024-08-17 RX ADMIN — ACETYLCYSTEINE 4 ML: 100 SOLUTION ORAL; RESPIRATORY (INHALATION) at 08:00

## 2024-08-17 RX ADMIN — AZITHROMYCIN DIHYDRATE 250 MG: 250 TABLET ORAL at 08:22

## 2024-08-17 RX ADMIN — PREDNISONE 40 MG: 20 TABLET ORAL at 08:22

## 2024-08-17 RX ADMIN — SPIRONOLACTONE 25 MG: 25 TABLET, FILM COATED ORAL at 08:22

## 2024-08-17 RX ADMIN — ACETYLCYSTEINE 4 ML: 100 SOLUTION ORAL; RESPIRATORY (INHALATION) at 11:40

## 2024-08-17 RX ADMIN — LISINOPRIL 20 MG: 20 TABLET ORAL at 08:22

## 2024-08-17 RX ADMIN — METOPROLOL SUCCINATE 50 MG: 50 TABLET, EXTENDED RELEASE ORAL at 08:22

## 2024-08-17 RX ADMIN — ALBUTEROL SULFATE 2.5 MG: 2.5 SOLUTION RESPIRATORY (INHALATION) at 11:40

## 2024-08-17 ASSESSMENT — ACTIVITIES OF DAILY LIVING (ADL)
ADLS_ACUITY_SCORE: 32
ADLS_ACUITY_SCORE: 31
ADLS_ACUITY_SCORE: 32
ADLS_ACUITY_SCORE: 31
ADLS_ACUITY_SCORE: 32
ADLS_ACUITY_SCORE: 31
ADLS_ACUITY_SCORE: 32
ADLS_ACUITY_SCORE: 31
ADLS_ACUITY_SCORE: 31
ADLS_ACUITY_SCORE: 32
ADLS_ACUITY_SCORE: 31
ADLS_ACUITY_SCORE: 32

## 2024-08-17 NOTE — DISCHARGE SUMMARY
Patient discharged home via private car, transported via wheelchair with nursing staff.  Patient verbalized and received copy of discharge instructions.  Patient received medications filled by discharge Pharmacy, and O2/neb from homeservices..  Personal belongings gathered and sent with patient.  VSS. IV removed prior to discharge.

## 2024-08-17 NOTE — PLAN OF CARE
"Temp: 98.1  F (36.7  C) Temp src: Oral BP: (!) 179/90 Pulse: 68   Resp: 18 SpO2: 90 % O2 Device: None (Room air) Oxygen Delivery: 2 LPM      A&Ox4, denies pain. BP high but not at threshold for hydralazine, received scheduled BP meds at this time. Satting in the 90's at rest on RA, needs home O2 for activity-Orders and test in epic. Independent in room. Regular diet-good appetite. ACHS with sliding scale-none needed. Discharging home later this afternoon.          Goal Outcome Evaluation:      Plan of Care Reviewed With: patient    Overall Patient Progress: improvingOverall Patient Progress: improving    Outcome Evaluation: Road O2 test done results in chart. No O2 at rest. No pain, SOB reported. Independent in room with no issues. Good appetite      Problem: Adult Inpatient Plan of Care  Goal: Plan of Care Review  Description: The Plan of Care Review/Shift note should be completed every shift.  The Outcome Evaluation is a brief statement about your assessment that the patient is improving, declining, or no change.  This information will be displayed automatically on your shift  note.  Outcome: Met  Flowsheets (Taken 8/17/2024 1215)  Outcome Evaluation: Road O2 test done results in chart. No O2 at rest. No pain, SOB reported. Independent in room with no issues. Good appetite  Plan of Care Reviewed With: patient  Overall Patient Progress: improving  Goal: Patient-Specific Goal (Individualized)  Description: You can add care plan individualizations to a care plan. Examples of Individualization might be:  \"Parent requests to be called daily at 9am for status\", \"I have a hard time hearing out of my right ear\", or \"Do not touch me to wake me up as it startles  me\".  Outcome: Met  Goal: Optimal Comfort and Wellbeing  Outcome: Met  Goal: Readiness for Transition of Care  Outcome: Met     Problem: Gas Exchange Impaired  Goal: Optimal Gas Exchange  Outcome: Met  Intervention: Optimize Oxygenation and Ventilation  Recent " Flowsheet Documentation  Taken 8/17/2024 0848 by Edgar Irby RN  Head of Bed ([facility]) Positioning: HOB at 20-30 degrees     Problem: COPD (Chronic Obstructive Pulmonary Disease)  Goal: Optimal Level of Functional [place]  Outcome: Met  Intervention: Optimize Functional Ability  Recent Flowsheet Documentation  Taken 8/17/2024 0848 by Edgar Irby RN  Activity Management: activity adjusted per tolerance  Goal: Absence of Infection Signs and Symptoms  Outcome: Met  Goal: Improved Oral Intake  Outcome: Met  Goal: Effective Oxygenation and Ventilation  Outcome: Met  Intervention: Promote Airway Secretion Clearance  Recent Flowsheet Documentation  Taken 8/17/2024 0848 by Edgar Irby RN  Cough And Deep Breathing: done independently per patient  Activity Management: activity adjusted per tolerance  Intervention: Optimize Oxygenation and Ventilation  Recent Flowsheet Documentation  Taken 8/17/2024 0848 by Edgar Irby RN  Head of Bed ([facility]) Positioning: HOB at 20-30 degrees     Problem: Comorbidity Management  Goal: Maintenance of Asthma Control  Outcome: Met  Intervention: Maintain Asthma Symptom Control  Recent Flowsheet Documentation  Taken 8/17/2024 0848 by Edgar Irby RN  Medication Review/Management: medications reviewed  Goal: Maintenance of Behavioral Health Symptom Control  Outcome: Met  Intervention: Maintain Behavioral Health Symptom Control  Recent Flowsheet Documentation  Taken 8/17/2024 0848 by Edgar Irby RN  Medication Review/Management: medications reviewed  Goal: Maintenance of COPD Symptom Control  Outcome: Met  Intervention: Maintain COPD Symptom Control  Recent Flowsheet Documentation  Taken 8/17/2024 0848 by Edgar Irby RN  Medication Review/Management: medications reviewed  Goal: Blood Glucose Levels Within Targeted Range  Outcome: Met  Intervention: Monitor and Manage Glycemia  Recent Flowsheet Documentation  Taken 8/17/2024 0848 by Edgar Irby  RN  Medication Review/Management: medications reviewed  Goal: Maintenance of Heart Failure Symptom Control  Outcome: Met  Intervention: Maintain Heart Failure Management  Recent Flowsheet Documentation  Taken 8/17/2024 0848 by Edgar Irby RN  Medication Review/Management: medications reviewed  Goal: Blood Pressure in Desired Range  Outcome: Met  Intervention: Maintain Blood Pressure Management  Recent Flowsheet Documentation  Taken 8/17/2024 0848 by Edgar Irby RN  Medication Review/Management: medications reviewed  Goal: Maintenance of Osteoarthritis Symptom Control  Outcome: Met  Intervention: Maintain Osteoarthritis Symptom Control  Recent Flowsheet Documentation  Taken 8/17/2024 0848 by Edgar Irby RN  Activity Management: activity adjusted per tolerance  Medication Review/Management: medications reviewed  Goal: Bariatric Home Regimen Maintained  Outcome: Met  Intervention: Maintain and Manage Postbariatric Surgery Care  Recent Flowsheet Documentation  Taken 8/17/2024 0848 by Edgar Irby RN  Medication Review/Management: medications reviewed  Goal: Maintenance of Seizure Control  Outcome: Met  Intervention: Maintain Seizure Symptom Control  Recent Flowsheet Documentation  Taken 8/17/2024 0848 by Edgar Irby RN  Medication Review/Management: medications reviewed

## 2024-08-17 NOTE — PROGRESS NOTES
Patient has been assessed for Home Oxygen needs. Oxygen readings:    *Pulse oximetry (SpO2) = 92% on room air at rest while awake.    *SpO2 improved to 94% on 1liters/minute at rest.    *SpO2 = 87% on room air during activity/with exercise.    *SpO2 improved to 93% on 2liters/minute during activity/with exercise.

## 2024-08-17 NOTE — PLAN OF CARE
"Alert and oriented x 4 , on 2 L  Oxygen via NC , insuline provided per POC .  Ambulated to bathroom , SBA .   New PIV inserted at the R Fore arm . Left arm  PIV removed because of IV infiltration . Cold pack applied and the swelling at the IV insertion site subsided     Goal Outcome Evaluation:      Plan of Care Reviewed With: patient    Overall Patient Progress: improving     Outcome Evaluation: Afebril , On 2 L O2 via NC Sat in the 90 ' . High SBP. Expiratory wheezing all the lung field . Denied chest pain  or palpitation  or GI symptoms . Productive cough of whitish sputum . ceftriaxone provided .       Problem: Adult Inpatient Plan of Care  Goal: Plan of Care Review  Description: The Plan of Care Review/Shift note should be completed every shift.  The Outcome Evaluation is a brief statement about your assessment that the patient is improving, declining, or no change.  This information will be displayed automatically on your shift  note.  Outcome: Progressing  Flowsheets (Taken 8/17/2024 4150)  Outcome Evaluation: High BP  Plan of Care Reviewed With: patient  Overall Patient Progress: improving  Goal: Patient-Specific Goal (Individualized)  Description: You can add care plan individualizations to a care plan. Examples of Individualization might be:  \"Parent requests to be called daily at 9am for status\", \"I have a hard time hearing out of my right ear\", or \"Do not touch me to wake me up as it startles  me\".  Outcome: Progressing  Goal: Optimal Comfort and Wellbeing  Outcome: Progressing  Intervention: Provide Person-Centered Care  Recent Flowsheet Documentation  Taken 8/16/2024 1923 by Leandro Pal RN  Trust Relationship/Rapport:   care explained   choices provided   questions encouraged   questions answered  Goal: Readiness for Transition of Care  Outcome: Progressing     Problem: Gas Exchange Impaired  Goal: Optimal Gas Exchange  Outcome: Progressing  Intervention: Optimize Oxygenation and " Ventilation  Recent Flowsheet Documentation  Taken 8/16/2024 1923 by Leandro Pal RN  Head of Bed ([facility]) Positioning: HOB at 30-45 degrees     Problem: COPD (Chronic Obstructive Pulmonary Disease)  Goal: Optimal Level of Functional [place]  Outcome: Progressing  Intervention: Optimize Functional Ability  Recent Flowsheet Documentation  Taken 8/16/2024 1923 by Leandro Pal RN  Activity Management: activity adjusted per tolerance  Goal: Absence of Infection Signs and Symptoms  Outcome: Progressing  Goal: Improved Oral Intake  Outcome: Progressing  Goal: Effective Oxygenation and Ventilation  Outcome: Progressing  Intervention: Promote Airway Secretion Clearance  Recent Flowsheet Documentation  Taken 8/16/2024 1923 by Leandro Pal RN  Cough And Deep Breathing: done independently per patient  Activity Management: activity adjusted per tolerance  Intervention: Optimize Oxygenation and Ventilation  Recent Flowsheet Documentation  Taken 8/16/2024 1923 by Leandro Pal RN  Head of Bed ([facility]) Positioning: HOB at 30-45 degrees     Problem: Comorbidity Management  Goal: Maintenance of Asthma Control  Outcome: Progressing  Intervention: Maintain Asthma Symptom Control  Recent Flowsheet Documentation  Taken 8/16/2024 1923 by Leandro Pal RN  Medication Review/Management: medications reviewed  Goal: Maintenance of Behavioral Health Symptom Control  Outcome: Progressing  Intervention: Maintain Behavioral Health Symptom Control  Recent Flowsheet Documentation  Taken 8/16/2024 1923 by Leandro Pal RN  Medication Review/Management: medications reviewed  Goal: Maintenance of COPD Symptom Control  Outcome: Progressing  Intervention: Maintain COPD Symptom Control  Recent Flowsheet Documentation  Taken 8/16/2024 1923 by Leandro Pal RN  Medication Review/Management: medications reviewed  Goal: Blood Glucose Levels Within Targeted Range  Outcome: Progressing  Intervention: Monitor and Manage Glycemia  Recent  Flowsheet Documentation  Taken 8/16/2024 1923 by Leandro Pal RN  Medication Review/Management: medications reviewed  Goal: Maintenance of Heart Failure Symptom Control  Outcome: Progressing  Intervention: Maintain Heart Failure Management  Recent Flowsheet Documentation  Taken 8/16/2024 1923 by Leandro Pal RN  Medication Review/Management: medications reviewed  Goal: Blood Pressure in Desired Range  Outcome: Progressing  Intervention: Maintain Blood Pressure Management  Recent Flowsheet Documentation  Taken 8/16/2024 1923 by Leandro Pal RN  Medication Review/Management: medications reviewed  Goal: Maintenance of Osteoarthritis Symptom Control  Outcome: Progressing  Intervention: Maintain Osteoarthritis Symptom Control  Recent Flowsheet Documentation  Taken 8/16/2024 1923 by Leandro Pal RN  Activity Management: activity adjusted per tolerance  Medication Review/Management: medications reviewed  Goal: Bariatric Home Regimen Maintained  Outcome: Progressing  Intervention: Maintain and Manage Postbariatric Surgery Care  Recent Flowsheet Documentation  Taken 8/16/2024 1923 by Leandro Pal RN  Medication Review/Management: medications reviewed  Goal: Maintenance of Seizure Control  Outcome: Progressing  Intervention: Maintain Seizure Symptom Control  Recent Flowsheet Documentation  Taken 8/16/2024 1923 by Leandro Pal RN  Sensory Stimulation Regulation:   care clustered   lighting decreased   quiet environment promoted  Medication Review/Management: medications reviewed

## 2024-08-17 NOTE — DISCHARGE SUMMARY
"[facility]  Hospitalist Discharge Summary      Date of Admission:  8/11/2024  Date of Discharge:  8/17/2024  Discharging Provider: Hector Hood MD, MD  Discharge Service: Hospitalist Service    Discharge Diagnoses   Acute hypoxic and hypercapnic respiratory failure secondary to underlying suspected bacterial pneumonia left lower lung and concomitant suspected COPD in exacerbation with accompanying acute congestive heart failure with reduced EF in exacerbation  Severe sepsis with lactic acidosis, leukocytosis, tachycardia, tachypnea secondary to underlying suspected bacterial left lung pneumonia  CYNTHIA likely on top of CKD stage IIIa  Solitary kidney  Non-insulin-requiring diabetes mellitus  Benign essential hypertension  Type II AMI secondary to respiratory failure with detectable troponin  History of CAD  Tobacco abuse  Dyslipidemia  Physical deconditioning    Clinically Significant Risk Factors     # Obesity: Estimated body mass index is 36.01 kg/m  as calculated from the following:    Height as of this encounter: 1.575 m (5' 2\").    Weight as of this encounter: 89.3 kg (196 lb 14.4 oz).       Follow-ups Needed After Discharge   Follow-up Appointments     Follow-up and recommended labs and tests       Follow up with primary care provider, Physician No Ref-Primary, within 7   days to evaluate medication change, to evaluate treatment change, and for   hospital follow- up.  Will benefit for pulmonary function testing, repeat   BMP as patient started on ACE inhibitors, diuretics with history of   solitary kidney and likely has chronic kidney disease  Follow-up with cardiology if agreeable for further evaluation and workup  Follow-up with pulmonary service  Smoking cessation            Unresulted Labs Ordered in the Past 30 Days of this Admission       No orders found from 7/12/2024 to 8/12/2024.          Oxygen Documentation  I certify that this patient, Allyson Samuel has been under my care " (or a nurse practitioner or physican's assistant working with me). This is the face-to-face encounter for oxygen medical necessity.      At the time of this encounter, I have reviewed the qualifying testing and have determined that supplemental oxygen is reasonable and necessary and is expected to improve the patient's condition in a home setting.         Patient has continued oxygen desaturation due to Emphysema J43.9.    If portability is ordered, is the patient mobile within the home? yes    Was this visit performed as a telehealth visit: No    Discharge Disposition   Discharged to home  Condition at discharge: Stable    Hospital Course     Ms. Valadez is a very pleasant lady who lives at home and has a background history of chronic tobacco abuse with no formal diagnosis of COPD, prior known CAD, hypertension and macular degeneration, also had a previous nephrectomy for urothelial cancer but was lost to follow-up since 2019 and has a presents to the hospital due to episodes of shortness of breath and not feeling well subsequently been found with acute hypoxic respiratory failure likely multifactorial from underlying suspected pneumonia, suspected COPD and exacerbation accompanied with acute congestive heart failure, with reduced ejection fraction.  Initially she was managed with bilevel PAP support and fortunately had a good recovery from it.  Received antibiotics, breathing treatments, pulmonary toileting and corticosteroids treatment.  Currently showing significant improvement as she is significantly feeling better in terms of her earlier shortness of breath and generalized weakness.  She has been requesting for hospital discharge as soon as able.  Currently tolerating oral diet.  New regimen for her concomitant cardiac comorbidities such as congestive heart failure, previously known CAD, hypertension.  Initiated on lisinopril, beta-blockers, placed on Aldactone and loop diuretics with torsemide.  This needs to be  "closely monitored and followed as outpatient and repeat electrolytes and kidney function.  I believe patient has a component of CKD at least stage IIIa.  She will need to establish outpatient care with PCP, cardiology and will also benefit for pulmonary service evaluation.  I believe she will also need further lung evaluation such as pulmonary function test given longstanding history of tobacco abuse, findings of emphysematous changes and high suspicion with underlying COPD.  She will be discharged on as needed albuterol, DuoNeb and likely might need controlling inhalers if she is agreeable to it upon follow-up care.  She mentioned that she will try her very best to pursue smoking cessation upon discharge.  Continuation of short course antibiotics, tapering dose of steroids were also provided.  She prefers not to have any insulin administration upon discharge.  Anticipating further tapering dose of prednisone will improve earlier hyperglycemia.  Needs to be reevaluated as outpatient.  No oral hypoglycemics provided yet upon discharge.  Can be considered upon follow-up care if kidney function remained stable as patient just started on several medication regimen        Allyson Samuel is a 79 year old female who came to attention on  on 8/11/2024 due to \"feeling sick\" and SOB for several days PTA.  She was found to be in acute hypoxic resp failure and was admitted with severe sepsis and acute hypoxic and hypercapnic resp failure due to pneumonia.    Significant PMH includes tobacco abuse without a formal dx of COPD, CAD (hx MI and stenting in 2003), HTN, Macular degeneration.  She underwent left nephrectomy in 2015 for urothelial cancer. The pt apparently has not followed regularly with medical care since about 2019.  Needs to re-establish care.     Imaging: left lower lobe infiltrate with air bronchograms consistent with pneumonia. Otherwise, pt with evident emphysematous changes throughout both lungs.      DX:  Acute " hypoxic and hypercapnic resp failure in the setting of evident emphysematous changes in both lungs and evident left lower lobe infiltrate.  Initialy VBG showed pH 7.11 with pCO2 62.    Severe sepsis with lactic acidosis, leukocytosis, mild CYNTHIA.   Acute HFrEF.  Elevated BNP, EF measured at 48%.  Seen by Cardiology and started on recommended HF med therapies. Will add Torsemide today to see if that also seems to help with hypoxia, etc.   Ventricular ectopy v aberrantly conducted a fib. Documented at 0141 on telemetry.   Elevated troponin thought to be due to strain in the setting of acute infection.   Mild CYNTHIA v CKD stage 3a with electrolyte disturbances. (Hyponatremia, low bicarbonate now resolved.) It appears that her kidney function has remained stable through this hospital stay.     HTN.  Making HF more difficult to manage.   DM II. Hyperglycemia exacerbated by steroid exposure.   Physical deconditioning      PLAN:  1.  Cont with empiric Ceftriaxone and Azithromycin.   -Change prednisone to 40 mg daily starting tomorrow   -Continued on breathing treatments   -Monitor loose stooling   -She mentioned that she will try her very best to do smoking cessation   -Will need close follow-up as outpatient and may also benefit from pulmonary function testing if agreeable   -Torsemide started earlier  -  2.  Pulmonary toilet with mucomyst nebs.   3.  Wean O2 as able. Pt managed to wean from BiPAP more than 48 hours ago.  4.  Gloria Long) saw pt on 8/13 and recommended medication regimen for Systolic HF in the setting of CAD.  Stop amlodipine, advance dose of metoprolol, resume lisinopril and start spironolactone along with empagliflozin. Also recommended atorvastatin.   -Cardiology offered further workup and restratification patient politely declined further intervention.  Will benefit for cardiology and core clinic follow-up if agreeable  -Blood pressures permitting with current beta-blockers and ACE inhibitors    5.   Given pt's report of SANCHEZ/chest discomfort, will consider stress testing. She would likely not be a good candidate for anything beyond medical therapy, but it would be worthwhile to know if she has symptomatic CAD..  -See earlier discussion regarding patient opting not to further pursue cardiac workup    6.  Start torsemide 5 mg today.   7.  Will need to re-establish primary care on discharge.   -Highly appreciate getting samples from therapy services    Consultations This Hospital Stay   CARDIOLOGY IP CONSULT  CARDIOLOGY IP CONSULT  PHYSICAL THERAPY ADULT IP CONSULT  OCCUPATIONAL THERAPY ADULT IP CONSULT    Code Status   No CPR- Do NOT Intubate    Time Spent on this Encounter   I, Hector Hood MD, MD, personally saw the patient today and spent greater than 30 minutes discharging this patient.       Hector Hood MD, MD  [facility] [address] [facility]  [address] [place] [address]  Phone: [phone]  Fax: [phone]  ______________________________________________________________________    Physical Exam   Vital Signs: Temp: 98.1  F (36.7  C) Temp src: Oral BP: (!) 183/98 Pulse: 76   Resp: 20 SpO2: 97 % O2 Device: Nasal cannula Oxygen Delivery: 2 LPM  Weight: 196 lbs 14.4 oz  HEENT; Atraumatic, normocephalic, pinkish conjuctiva, pupils bilateral reactive   Skin: warm and moist, no rashes  Lymphatics: no cervical or axillary lymphandenopathy  Lungs: equal chest expansion, clear to auscultation, no wheezes, no stridor, no crackles,   Heart: normal rate, normal rhythm, no rubs or gallops.   Abdomen: normal bowel sounds, no tenderness, no peritoneal signs, no guarding  Extremities: no deformities, no edema   Neuro; follow commands, alert and oriented x3, spontaneous speech, coherent, moves all extremities spontaneously  Psych; no hallucination, euthymic mood, not agitated         Primary Care Physician   Physician No Ref-Primary    Discharge Orders      Reason for your  hospital stay    Ms. Valadez is a very pleasant lady who lives at home and has a background history of chronic tobacco abuse with no formal diagnosis of COPD, prior known CAD, hypertension and macular degeneration, also had a previous nephrectomy for urothelial cancer but was lost to follow-up since 2019 and has a presents to the hospital due to episodes of shortness of breath and not feeling well subsequently been found with acute hypoxic respiratory failure likely multifactorial from underlying suspected pneumonia, suspected COPD and exacerbation accompanied with acute congestive heart failure, with reduced ejection fraction.  Initially she was managed with bilevel PAP support and fortunately had a good recovery from it.  Received antibiotics, breathing treatments, pulmonary toileting and corticosteroids treatment.  Currently showing significant improvement as she is significantly feeling better in terms of her earlier shortness of breath and generalized weakness.  She has been requesting for hospital discharge as soon as able.  Currently tolerating oral diet.  New regimen for her concomitant cardiac comorbidities such as congestive heart failure, previously known CAD, hypertension.  Initiated on lisinopril, beta-blockers, placed on Aldactone and loop diuretics with torsemide.  This needs to be closely monitored and followed as outpatient and repeat electrolytes and kidney function.  I believe patient has a component of CKD at least stage IIIa.  She will need to establish outpatient care with PCP, cardiology and will also benefit for pulmonary service evaluation.  I believe she will also need further lung evaluation such as pulmonary function test given longstanding history of tobacco abuse, findings of emphysematous changes and high suspicion with underlying COPD.  She will be discharged on as needed albuterol, DuoNeb and likely might need controlling inhalers if she is agreeable to it upon follow-up care.  She  mentioned that she will try her very best to pursue smoking cessation upon discharge.  Continuation of short course antibiotics, tapering dose of steroids were also provided.     Follow-up and recommended labs and tests     Follow up with primary care provider, Physician No Ref-Primary, within 7 days to evaluate medication change, to evaluate treatment change, and for hospital follow- up.  Will benefit for pulmonary function testing, repeat BMP as patient started on ACE inhibitors, diuretics with history of solitary kidney and likely has chronic kidney disease  Follow-up with cardiology if agreeable for further evaluation and workup  Follow-up with pulmonary service  Smoking cessation     Activity    Your activity upon discharge: activity as tolerated     No CPR- Do NOT Intubate     Nebulizer and Supplies Order    Nebulizer Documentation  I attest that I have seen and evaluated Allyson Samuel. She has a diagnosis of J18.9 - Pneumonia, unspecified organism and a nebulizer machine is needed to administer medication to improve breathing passages.     I, the undersigned, certify that the above prescribed supplies are medically necessary for this patient and is both reasonable and necessary in reference to accepted standards of medical and necessary in reference to accepted standards of medical practice in the treatment of this patient's condition and is not prescribed as a convenience.     Diet    Follow this diet upon discharge: Orders Placed This Encounter      Regular Diet Adult       Significant Results and Procedures   Most Recent 3 CBC's:  Recent Labs   Lab Test 08/17/24  0708 08/13/24  0721 08/12/24  0856 08/11/24 2003   WBC  --  13.2* 12.7* 16.2*   HGB  --  13.6 14.7 16.2*   MCV  --  91 92 94    150 149* 191     Most Recent 3 BMP's:  Recent Labs   Lab Test 08/17/24  0708 08/17/24  0227 08/16/24  2204 08/16/24  1714 08/16/24  0810 08/16/24  0639 08/15/24  0801 08/15/24  0644 08/14/24  1204 08/14/24  0638    NA  --   --   --   --   --  140  --  136  --  137   POTASSIUM 4.4  --   --   --   --  4.4  --  4.4  --  4.7   CHLORIDE  --   --   --   --   --  106  --  103  --  103   CO2  --   --   --   --   --  22  --  22  --  22   BUN  --   --   --   --   --  27.2*  --  28.5*  --  30.9*   CR  --   --   --   --   --  1.01*  --  1.11*  --  1.21*   ANIONGAP  --   --   --   --   --  12  --  11  --  12   KIRSTIE  --   --   --   --   --  9.2  --  9.3  --  9.4   GLC  --  90 255* 195*   < > 101*   < > 110*   < > 116*  111*    < > = values in this interval not displayed.     Most Recent 2 LFT's:  Recent Labs   Lab Test 08/14/24  0638 08/12/24  0856   AST 24 40   ALT 20 14   ALKPHOS 78 86   BILITOTAL 0.2 0.3     Most Recent 3 INR's:  Recent Labs   Lab Test 08/11/24 2003   INR 1.02     Most Recent 3 Troponin's:No lab results found.  Most Recent 3 BNP's:  Recent Labs   Lab Test 08/11/24 2003   NTBNPI 3,103*     Most Recent TSH and T4:  Recent Labs   Lab Test 08/11/24 2003   TSH 1.10     Most Recent Hemoglobin A1c:  Recent Labs   Lab Test 08/11/24 2003   A1C 6.4*     Most Recent 6 glucoses:  Recent Labs   Lab Test 08/17/24  0227 08/16/24  2204 08/16/24  1714 08/16/24  1149 08/16/24  0810 08/16/24  0639   GLC 90 255* 195* 107* 79 101*     Most Recent Urinalysis:No lab results found.,   Results for orders placed or performed during the hospital encounter of 08/11/24   XR Chest Port 1 View    Narrative    EXAM: XR CHEST PORT 1 VIEW  LOCATION: [facility]  DATE: 8/11/2024    INDICATION: Respiratory distress.  COMPARISON: 4/14/2015      Impression    IMPRESSION:     Diffuse interstitial prominence with hazy right mid to lower lung and left lower lung opacities, either representing mild interstitial and alveolar pulmonary edema versus multifocal atypical infection.    No pleural effusion or pneumothorax.    The cardiac and mediastinal silhouette is unremarkable. Aortic calcifications.   CT Chest Pulmonary Embolism w  Contrast    Narrative    EXAM: CT CHEST PULMONARY EMBOLISM W CONTRAST  LOCATION: [facility]  DATE: 8/11/2024    INDICATION: Hypoxic respiratory failure. Evaluate for PE.  COMPARISON: Portable chest single view 8/11/2024 at 22 6 hours.  TECHNIQUE: CT chest pulmonary angiogram during arterial phase injection of IV contrast. Multiplanar reformats and MIP reconstructions were performed. Dose reduction techniques were used.   CONTRAST: 75 mL Isovue 370.    FINDINGS:  ANGIOGRAM CHEST: No pulmonary emboli on either side. Atherosclerotic normal caliber thoracic aorta without aneurysm or dissection. No CT evidence of right heart strain pattern.    LUNGS AND PLEURA: Moderate emphysematous changes. Diffuse thickening of the bronchi. Slight airspace infiltrate in the left upper lobe, likely an infectious or an inflammatory process. Platelike atelectasis in the lower lungs, more apparent in the left   lower lobe. No pleural effusion on either side.    MEDIASTINUM/AXILLAE: Cardiac size approaches upper limits of normal. Dense coronary artery calcifications. No pericardial effusion. Calcified subcarinal nodes indicative of remote granulomatous disease. No suspicious adenopathy. Trachea is midline.    CORONARY ARTERY CALCIFICATION: Severe.    UPPER ABDOMEN: Bilateral adrenal hypodense benign adenomas. Atherosclerotic changes.    MUSCULOSKELETAL: Degenerative changes both shoulders and the thoracic spine.      Impression    IMPRESSION:  1.  No pulmonary emboli on either side. Moderate emphysematous changes. Diffuse thickening of the bronchi. Left upper lobe airspace infiltrate, likely representing an infectious or an inflammatory process. Bibasilar platelike atelectasis, more apparent   in the left lower lobe. No suspicious adenopathy or pleural effusion on either side. Evidence of remote granulomatous disease.    2.  Atherosclerotic thoracic aorta without aneurysm or dissection. Cardiac size approaches upper  limits of normal. Dense coronary artery calcifications. No pericardial effusion.    3.  Bilateral adrenal hypodense benign adenomas.   XR Chest Port 1 View    Narrative    EXAM: XR CHEST PORT 1 VIEW  LOCATION: [facility]  DATE: 2024    INDICATION: worsening hypoxia  COMPARISON: 2024      Impression    IMPRESSION: Unchanged opacification of the left lung base. No pleural effusion. No pneumothorax. The cardiac mediastinal silhouette is within normal limits. Calcified aortic knob. The visualized osseous structures are intact.   Echocardiogram Complete     Value    Biplane LVEF 48%    Narrative    [id number]  [id number]  [id number]  [id number]^LIBAN^FRANCIS     [facility]  Echocardiography Laboratory  [address] [place] [address]     Name: SELMA WELLER  MRN: [id number]  : 1945  Study Date: 2024 07:10 AM  Age: 79 yrs  Gender: Female  Patient Location: [facility]  Reason For Study: CHF  Ordering Physician: FRANCIS LOUIE  Performed By: Milena Vazquez RDCS     BSA: 1.9 m2  Height: 62 in  Weight: 198 lb  HR: 91  BP: 148/74 mmHg  ______________________________________________________________________________  Procedure  Complete Portable Echo Adult. Optison (NDC #6392-1213) given intravenously.  ______________________________________________________________________________  Interpretation Summary     1. The left ventricle is normal in size. Biplane LVEF is 48%. There is mild  global hypokinesia of the left ventricle.  2. The right ventricle is normal in structure, function and size.  3. No valve disease.     Echo in  showed EF 60%.  ______________________________________________________________________________  Left Ventricle  The left ventricle is normal in size. There is normal left ventricular wall  thickness. Biplane LVEF is 48%. Left ventricular diastolic function is  indeterminate. There is mild global hypokinesia of the left ventricle.     Right  Ventricle  The right ventricle is normal in structure, function and size.     Atria  Normal left atrial size. Right atrial size is normal. There is no atrial shunt  seen.     Mitral Valve  There is mild (1+) mitral regurgitation.     Tricuspid Valve  There is trace to mild tricuspid regurgitation.     Aortic Valve  The aortic valve is normal in structure and function.     Pulmonic Valve  The pulmonic valve is normal in structure and function.     Vessels  Normal ascending, transverse (arch), and descending aorta. Dilation of the  inferior vena cava is present with normal respiratory variation in diameter.     Pericardium  There is no pericardial effusion.     Rhythm  Sinus rhythm was noted.  ______________________________________________________________________________  MMode/2D Measurements & Calculations     IVSd: 1.3 cm  LVIDd: 4.8 cm  LVIDs: 4.2 cm  LVPWd: 1.1 cm  IVC diam: 2.3 cm  FS: 11.8 %  LV mass(C)d: 215.0 grams  LV mass(C)dI: 113.0 grams/m2  Ao root diam: 3.3 cm  asc Aorta Diam: 3.6 cm  Ao root diam index Ht(cm/m): 2.1  Ao root diam index BSA (cm/m2): 1.7  Asc Ao diam index BSA (cm/m2): 1.9  Asc Ao diam index Ht(cm/m): 2.3  EF Biplane: 48.9 %  LA Volume (BP): 60.4 ml     LA Volume Index (BP): 31.8 ml/m2  RV Base: 3.0 cm  RWT: 0.45  TAPSE: 1.9 cm     Doppler Measurements & Calculations  MV E max benito: 92.9 cm/sec  MV A max benito: 94.8 cm/sec  MV E/A: 0.98  MV max P.2 mmHg  MV mean PG: 3.0 mmHg  MV V2 VTI: 25.2 cm  MV dec time: 0.14 sec  PA acc time: 0.05 sec  TR max benito: 268.1 cm/sec  TR max P.8 mmHg  E/E' avg: 10.0  Lateral E/e': 7.9  Medial E/e': 12.0     RV S Benito: 15.4 cm/sec     ______________________________________________________________________________  Report approved by: Gem Henriquez 2024 09:22 AM             Discharge Medications   Current Discharge Medication List        START taking these medications    Details   albuterol (PROAIR HFA/PROVENTIL HFA/VENTOLIN HFA) 108 (77  Base) MCG/ACT inhaler Inhale 2 puffs into the lungs every 6 hours as needed for shortness of breath, wheezing or cough  Qty: 18 g, Refills: 0    Comments: Pharmacy may dispense brand covered by insurance (Proair, or proventil or ventolin or generic albuterol inhaler)  Associated Diagnoses: Pneumonia of both lungs due to infectious organism, unspecified part of lung      amoxicillin-clavulanate (AUGMENTIN) 875-125 MG tablet Take 1 tablet by mouth 2 times daily for 2 days  Qty: 4 tablet, Refills: 0    Associated Diagnoses: Pneumonia of both lungs due to infectious organism, unspecified part of lung      atorvastatin (LIPITOR) 40 MG tablet Take 1 tablet (40 mg) by mouth every evening  Qty: 30 tablet, Refills: 0    Associated Diagnoses: Acute systolic congestive heart failure (H)      ipratropium - albuterol 0.5 mg/2.5 mg/3 mL (DUONEB) 0.5-2.5 (3) MG/3ML neb solution Take 1 vial (3 mLs) by nebulization every 6 hours as needed for shortness of breath, wheezing or cough  Qty: 90 mL, Refills: 0    Associated Diagnoses: Pneumonia of both lungs due to infectious organism, unspecified part of lung      lisinopril (ZESTRIL) 20 MG tablet Take 1 tablet (20 mg) by mouth daily  Qty: 30 tablet, Refills: 0    Associated Diagnoses: Acute systolic congestive heart failure (H)      metoprolol succinate ER (TOPROL XL) 50 MG 24 hr tablet Take 1 tablet (50 mg) by mouth daily  Qty: 30 tablet, Refills: 0    Associated Diagnoses: Acute systolic congestive heart failure (H)      predniSONE (DELTASONE) 20 MG tablet Take 2 tablets (40 mg) by mouth daily for 2 days, THEN 1 tablet (20 mg) daily for 3 days, THEN 0.5 tablets (10 mg) daily for 3 days.  Qty: 9 tablet, Refills: 0    Associated Diagnoses: Pneumonia of both lungs due to infectious organism, unspecified part of lung      spironolactone (ALDACTONE) 25 MG tablet Take 1 tablet (25 mg) by mouth daily  Qty: 30 tablet, Refills: 0    Associated Diagnoses: Acute systolic congestive heart failure  (H)      torsemide (DEMADEX) 5 MG tablet Take 1 tablet (5 mg) by mouth daily  Qty: 30 tablet, Refills: 0    Associated Diagnoses: Acute systolic congestive heart failure (H)           CONTINUE these medications which have NOT CHANGED    Details   acetaminophen 650 MG TABS Take 650 mg by mouth every 6 hours  Qty: 100 tablet, Refills: 0    Associated Diagnoses: Adrenal mass (H24)      aspirin 81 MG tablet Take 1 tablet (81 mg) by mouth daily  Qty: 30 tablet           Allergies   Allergies   Allergen Reactions    Latex Rash    Bees     Macrobid [Nitrofurantoin] Nausea

## 2024-08-18 NOTE — PLAN OF CARE
Occupational Therapy Discharge Summary    Reason for therapy discharge:    Discharged to home with assist and OP pulmonary rehab    Progress towards therapy goal(s). See goals on Care Plan in [facility] electronic health record for goal details.  Goals partially met.  Barriers to achieving goals:   discharge from facility.    Therapy recommendation(s):    Continued therapy is recommended.  Rationale/Recommendations:  Anticipate pt will return home with daughter and son in law. Recommend assist with strenuous IADLs (cooking, cleaning, laundry, etc). Pt confirms she can have this level of assist. Recommend pt have supervision with med management and use pillboxes. Recommend outpatient pulmonary rehab as pt requiring supplemental O2..

## 2024-08-20 ENCOUNTER — PATIENT OUTREACH (OUTPATIENT)
Dept: CARE COORDINATION | Facility: CLINIC | Age: 79
End: 2024-08-20
Payer: MEDICARE

## 2024-08-29 DIAGNOSIS — R06.02 SOB (SHORTNESS OF BREATH): Primary | ICD-10-CM

## 2024-08-29 DIAGNOSIS — I21.4 NSTEMI (NON-ST ELEVATED MYOCARDIAL INFARCTION) (H): Primary | ICD-10-CM

## 2024-09-05 ENCOUNTER — HOSPITAL ENCOUNTER (OUTPATIENT)
Dept: CARDIAC REHAB | Facility: CLINIC | Age: 79
Discharge: HOME | End: 2024-09-05
Attending: INTERNAL MEDICINE
Payer: MEDICARE

## 2024-09-05 DIAGNOSIS — J18.9 PNEUMONIA OF BOTH LUNGS DUE TO INFECTIOUS ORGANISM, UNSPECIFIED PART OF LUNG: ICD-10-CM

## 2024-09-05 DIAGNOSIS — R06.02 SOB (SHORTNESS OF BREATH): ICD-10-CM

## 2024-09-05 DIAGNOSIS — I50.21 ACUTE SYSTOLIC CONGESTIVE HEART FAILURE (H): ICD-10-CM

## 2024-09-05 DIAGNOSIS — J96.02 ACUTE RESPIRATORY FAILURE WITH HYPOXIA AND HYPERCAPNIA (H): ICD-10-CM

## 2024-09-05 DIAGNOSIS — J96.01 ACUTE RESPIRATORY FAILURE WITH HYPOXIA AND HYPERCAPNIA (H): ICD-10-CM

## 2024-09-05 PROCEDURE — 999N000104 HC STATISTIC NO CHARGE

## 2024-09-12 ENCOUNTER — OFFICE VISIT (OUTPATIENT)
Dept: INTERNAL MEDICINE | Facility: CLINIC | Age: 79
End: 2024-09-12
Payer: MEDICARE

## 2024-09-12 VITALS
HEART RATE: 80 BPM | WEIGHT: 190 LBS | BODY MASS INDEX: 34.96 KG/M2 | DIASTOLIC BLOOD PRESSURE: 86 MMHG | OXYGEN SATURATION: 96 % | HEIGHT: 62 IN | TEMPERATURE: 97.9 F | RESPIRATION RATE: 18 BRPM | SYSTOLIC BLOOD PRESSURE: 124 MMHG

## 2024-09-12 DIAGNOSIS — J96.02 ACUTE RESPIRATORY FAILURE WITH HYPOXIA AND HYPERCAPNIA (H): Primary | ICD-10-CM

## 2024-09-12 DIAGNOSIS — J96.01 ACUTE RESPIRATORY FAILURE WITH HYPOXIA AND HYPERCAPNIA (H): Primary | ICD-10-CM

## 2024-09-12 DIAGNOSIS — E78.00 HIGH BLOOD CHOLESTEROL: ICD-10-CM

## 2024-09-12 DIAGNOSIS — I50.21 ACUTE SYSTOLIC CONGESTIVE HEART FAILURE (H): ICD-10-CM

## 2024-09-12 LAB
ERYTHROCYTE [DISTWIDTH] IN BLOOD BY AUTOMATED COUNT: 14.8 % (ref 10–15)
HCT VFR BLD AUTO: 44.6 % (ref 35–47)
HGB BLD-MCNC: 14.7 G/DL (ref 11.7–15.7)
MCH RBC QN AUTO: 29.7 PG (ref 26.5–33)
MCHC RBC AUTO-ENTMCNC: 33 G/DL (ref 31.5–36.5)
MCV RBC AUTO: 90 FL (ref 78–100)
PLATELET # BLD AUTO: 196 10E3/UL (ref 150–450)
RBC # BLD AUTO: 4.95 10E6/UL (ref 3.8–5.2)
WBC # BLD AUTO: 8.8 10E3/UL (ref 4–11)

## 2024-09-12 PROCEDURE — 80048 BASIC METABOLIC PNL TOTAL CA: CPT

## 2024-09-12 PROCEDURE — G2211 COMPLEX E/M VISIT ADD ON: HCPCS

## 2024-09-12 PROCEDURE — 85027 COMPLETE CBC AUTOMATED: CPT

## 2024-09-12 PROCEDURE — 36415 COLL VENOUS BLD VENIPUNCTURE: CPT

## 2024-09-12 PROCEDURE — 80061 LIPID PANEL: CPT

## 2024-09-12 PROCEDURE — 99204 OFFICE O/P NEW MOD 45 MIN: CPT

## 2024-09-12 RX ORDER — LISINOPRIL 20 MG/1
20 TABLET ORAL DAILY
Qty: 90 TABLET | Refills: 3 | Status: SHIPPED | OUTPATIENT
Start: 2024-09-12

## 2024-09-12 RX ORDER — ATORVASTATIN CALCIUM 40 MG/1
40 TABLET, FILM COATED ORAL EVERY EVENING
Qty: 30 TABLET | Refills: 0 | Status: SHIPPED | OUTPATIENT
Start: 2024-09-12 | End: 2024-09-14

## 2024-09-12 RX ORDER — SPIRONOLACTONE 25 MG/1
25 TABLET ORAL DAILY
Qty: 90 TABLET | Refills: 3 | Status: SHIPPED | OUTPATIENT
Start: 2024-09-12

## 2024-09-12 RX ORDER — TORSEMIDE 5 MG/1
5 TABLET ORAL DAILY
Qty: 90 TABLET | Refills: 3 | Status: SHIPPED | OUTPATIENT
Start: 2024-09-12

## 2024-09-12 RX ORDER — METOPROLOL SUCCINATE 50 MG/1
50 TABLET, EXTENDED RELEASE ORAL DAILY
Qty: 90 TABLET | Refills: 3 | Status: SHIPPED | OUTPATIENT
Start: 2024-09-12

## 2024-09-12 NOTE — PATIENT INSTRUCTIONS
Order a pulse oximeter to evaluate her oxygenation regularly. If top number for blood pressure is less than 100 do not take the lisinopril until the next dose than is due and use the same parameter each time. If the top number is persistently higher than 150 then reach out and the lisinopril dose may need to be increased.

## 2024-09-12 NOTE — PROGRESS NOTES
"  {PROVIDER CHARTING PREFERENCE:314831}    Subjective   Allyson is a 79 year old, presenting for the following health issues:  Hospital F/U      9/12/2024     4:48 PM   Additional Questions   Roomed by Aurelia HDZ CMA   Accompanied by daughter, Jennifer VINSON     {MA/LPN/RN Pre-Provider Visit Orders- hCG/UA/Strep (Optional):186875}    Hospital Follow-up Visit:    Hospital/Nursing Home/IP Rehab Facility: [facility]  Date of Admission: 8/11/2024  Date of Discharge: 8/17/2024  Reason(s) for Admission: acute respiratory failure  Was the patient in the ICU or did the patient experience delirium during hospitalization?  No  Do you have any other stressors you would like to discuss with your provider? No    Problems taking medications regularly:  None  Medication changes since discharge: None  Problems adhering to non-medication therapy:  None    Summary of hospitalization:  {:164247}  Diagnostic Tests/Treatments reviewed.  Follow up needed: {:945046:}  Other Healthcare Providers Involved in Patient s Care:         {those currently involved after discharge:862769::\"None\"}  Update since discharge: {:564917} {TIP  Include information from family/caregivers, SNF, Care Coordination :506620}        Plan of care communicated with {:286165}     {Reference  Coding guidelines- Moderate Complexity F2F/Video within 7 - 14 days of discharge 3572008, High Complexity F2F/Video within 7 days 0237120 or eelmap71 days 4395340 :303486}      {additonal problems for provider to add (Optional):659001}  {additonal problems for provider to add (Optional):244762}    {ROS Picklists (Optional):800386}      Objective    /86 (BP Location: Right arm, Patient Position: Sitting, Cuff Size: Adult Large)   Pulse 80   Temp 97.9  F (36.6  C) (Tympanic)   Resp 18   Ht 1.575 m (5' 2\")   Wt 86.2 kg (190 lb)   LMP  (LMP Unknown)   SpO2 96%   Breastfeeding No   BMI 34.75 kg/m    Body mass index is 34.75 kg/m .  Physical Exam "   {Exam List (Optional):274543}    {Diagnostic Test Results (Optional):582730}        Signed Electronically by: JULIETA Perez CNP  {Email feedback regarding this note to [email or web address]   :017153}

## 2024-09-12 NOTE — PROGRESS NOTES
"  Assessment & Plan     (J96.01,  J96.02) Acute respiratory failure with hypoxia and hypercapnia (H)  (primary encounter diagnosis)  Comment: Obtain a pulse oximeter to monitor oxygenation  Plan: Basic metabolic panel  (Ca, Cl, CO2, Creat,         Gluc, K, Na, BUN), CBC with platelets        Results pending    (E78.00) High blood cholesterol  Comment: Continue to take atorvastatin  Plan: Lipid panel reflex to direct LDL Non-fasting        Values looked great with the exception of increased triglycerides, due to patient having symptoms of myalgia reduced dose to 20 mg, prescription sent to pharmacy    (I50.21) Acute systolic congestive heart failure (H)  Comment: Continue to take the metoprolol lisinopril, torsemide and spironolactone to manage congestive heart failure symptoms  Plan: Adult Cardiology Eval [facility] Referral,         lisinopril (ZESTRIL) 20 MG tablet, metoprolol         succinate ER (TOPROL XL) 50 MG 24 hr tablet,         spironolactone (ALDACTONE) 25 MG tablet,         torsemide (DEMADEX) 5 MG tablet        Referral sent, prescription sent to pharmacy        MED REC REQUIRED  Post Medication Reconciliation Status:     BMI  Estimated body mass index is 34.75 kg/m  as calculated from the following:    Height as of this encounter: 1.575 m (5' 2\").    Weight as of this encounter: 86.2 kg (190 lb).             Gregg Valadez is a 79 year old, presenting for the following health issues: This is a follow up for an ED to hospital admission from 8/11/2024 until 8/17/2024.  Patient presented to the ED via EMS from home with shortness of breath.  Patient had been feeling sick for 3 days with cough and malaise and chills but no fevers.  Patient developed shortness of breath which dramatically worsened throughout the day along with tachycardia.  Patient was hypoxic on arrival with SpO2 in the low 80s.  Her systolic blood pressure ranged from  and her respiratory rate was 30 to 40 breaths a minute.  " sent Patient denies chest pain abdominal pain or pleuritic pain.    Pt denies any SOB.  Patient denies any fevers night sweats or chills.  Reviewed with patient what each medication is for. Pt climbs up the stairs everyday. Pt states she has not needed oxygen since she has been home.  Daughter was not sure what to do with the oxygen tank that they have at home.  I told her to wait for a while because it is better to have 1 and not needed it then to need 1 and not have it.  Also discussed the importance of obtaining a pulse oximeter to evaluate her oxygenation regularly.  Gave instructions in regards to monitoring her blood pressure.  Explained to patient and daughter that she should take her blood pressure every day prior to taking her blood pressure medications.  If the top number for her blood pressure is less than 100 do not take the lisinopril until the next dose is due and use the same parameter each time if the top number is persistently higher than 150 then reach out and the lisinopril dose may need to be increased.  Renewed her lisinopril, metoprolol, Torsemide and spironolactone  No chief complaint on file.    HPI               Review of Systems  Constitutional, HEENT, cardiovascular, pulmonary, gi and gu systems are negative, except as otherwise noted.      Objective    There were no vitals taken for this visit.  There is no height or weight on file to calculate BMI.  Physical Exam   GENERAL: alert and no distress  NECK: no adenopathy, no asymmetry, masses, or scars  RESP: lungs clear to auscultation - no rales, rhonchi or wheezes and decreased breath sounds throughout  CV: regular rate and rhythm, normal S1 S2, no S3 or S4, no murmur, click or rub, no peripheral edema  ABDOMEN: soft, nontender, no hepatosplenomegaly, no masses and bowel sounds normal  MS: no gross musculoskeletal defects noted, no edema  SKIN: no suspicious lesions or rashes  NEURO: Normal strength and tone, mentation intact and speech  normal  PSYCH: mentation appears normal, affect normal/bright            Signed Electronically by: JULIETA Perez CNP     detailed exam EOMI/PERRL

## 2024-09-13 LAB
ANION GAP SERPL CALCULATED.3IONS-SCNC: 12 MMOL/L (ref 7–15)
BUN SERPL-MCNC: 23.5 MG/DL (ref 8–23)
CALCIUM SERPL-MCNC: 9.8 MG/DL (ref 8.8–10.4)
CHLORIDE SERPL-SCNC: 104 MMOL/L (ref 98–107)
CHOLEST SERPL-MCNC: 177 MG/DL
CREAT SERPL-MCNC: 1.45 MG/DL (ref 0.51–0.95)
EGFRCR SERPLBLD CKD-EPI 2021: 36 ML/MIN/1.73M2
FASTING STATUS PATIENT QL REPORTED: NO
FASTING STATUS PATIENT QL REPORTED: NO
GLUCOSE SERPL-MCNC: 103 MG/DL (ref 70–99)
HCO3 SERPL-SCNC: 22 MMOL/L (ref 22–29)
HDLC SERPL-MCNC: 58 MG/DL
LDLC SERPL CALC-MCNC: 75 MG/DL
NONHDLC SERPL-MCNC: 119 MG/DL
POTASSIUM SERPL-SCNC: 4.9 MMOL/L (ref 3.4–5.3)
SODIUM SERPL-SCNC: 138 MMOL/L (ref 135–145)
TRIGL SERPL-MCNC: 218 MG/DL

## 2024-09-14 ENCOUNTER — HEALTH MAINTENANCE LETTER (OUTPATIENT)
Age: 79
End: 2024-09-14

## 2024-09-14 RX ORDER — ATORVASTATIN CALCIUM 20 MG/1
20 TABLET, FILM COATED ORAL DAILY
Qty: 90 TABLET | Refills: 3 | Status: SHIPPED | OUTPATIENT
Start: 2024-09-14

## 2025-07-15 ENCOUNTER — TRANSFERRED RECORDS (OUTPATIENT)
Dept: HEALTH INFORMATION MANAGEMENT | Facility: CLINIC | Age: 80
End: 2025-07-15
Payer: MEDICARE

## (undated) DEVICE — Device

## (undated) DEVICE — GLOVE PROTEXIS W/NEU-THERA 7.5  2D73TE75

## (undated) DEVICE — LUBRICATING JELLY 4.25OZ

## (undated) DEVICE — PAD FLOOR SURGISAFE

## (undated) DEVICE — SOL NACL 0.9% IRRIG 3000ML BAG 07972-08

## (undated) DEVICE — DRAPE C-ARM 60X42" 1013

## (undated) DEVICE — CATH URETERAL OPEN END 05FR 70CM 020015

## (undated) RX ORDER — LIDOCAINE HYDROCHLORIDE 10 MG/ML
INJECTION, SOLUTION EPIDURAL; INFILTRATION; INTRACAUDAL; PERINEURAL
Status: DISPENSED
Start: 2017-05-04

## (undated) RX ORDER — FENTANYL CITRATE 50 UG/ML
INJECTION, SOLUTION INTRAMUSCULAR; INTRAVENOUS
Status: DISPENSED
Start: 2017-05-04

## (undated) RX ORDER — PROPOFOL 10 MG/ML
INJECTION, EMULSION INTRAVENOUS
Status: DISPENSED
Start: 2017-05-04